# Patient Record
Sex: FEMALE | Race: BLACK OR AFRICAN AMERICAN | NOT HISPANIC OR LATINO | Employment: PART TIME | URBAN - METROPOLITAN AREA
[De-identification: names, ages, dates, MRNs, and addresses within clinical notes are randomized per-mention and may not be internally consistent; named-entity substitution may affect disease eponyms.]

---

## 2020-11-12 DIAGNOSIS — F17.210 CIGARETTE SMOKER: ICD-10-CM

## 2020-11-12 DIAGNOSIS — Z12.2 ENCOUNTER FOR SCREENING FOR MALIGNANT NEOPLASM OF RESPIRATORY ORGANS: Primary | ICD-10-CM

## 2022-03-22 ENCOUNTER — TELEPHONE (OUTPATIENT)
Dept: SURGERY | Facility: CLINIC | Age: 67
End: 2022-03-22
Payer: MEDICARE

## 2022-03-22 NOTE — TELEPHONE ENCOUNTER
----- Message from Porsche Tiwari sent at 3/22/2022 12:31 PM CDT -----  Contact: @[phone]  Pt requesting a call back to reschedule an appt she cancelled for 03/23/22 please call to discuss further.            03/22/22                1321  Spoke to patient regarding the above message. Informed her that she needs to see a colon and rectal doctor for rectal prolapse. Patient verbalized understanding.

## 2023-10-19 ENCOUNTER — TELEPHONE (OUTPATIENT)
Dept: SURGERY | Facility: CLINIC | Age: 68
End: 2023-10-19
Payer: MEDICARE

## 2024-01-25 ENCOUNTER — TELEPHONE (OUTPATIENT)
Dept: SURGERY | Facility: CLINIC | Age: 69
End: 2024-01-25
Payer: MEDICARE

## 2024-01-26 ENCOUNTER — TELEPHONE (OUTPATIENT)
Dept: ENDOSCOPY | Facility: HOSPITAL | Age: 69
End: 2024-01-26
Payer: MEDICARE

## 2024-01-26 ENCOUNTER — OFFICE VISIT (OUTPATIENT)
Dept: SURGERY | Facility: CLINIC | Age: 69
End: 2024-01-26
Payer: MEDICARE

## 2024-01-26 VITALS
HEART RATE: 104 BPM | HEIGHT: 62 IN | WEIGHT: 124.13 LBS | BODY MASS INDEX: 22.84 KG/M2 | SYSTOLIC BLOOD PRESSURE: 173 MMHG | DIASTOLIC BLOOD PRESSURE: 88 MMHG

## 2024-01-26 VITALS — HEIGHT: 62 IN | WEIGHT: 124 LBS | BODY MASS INDEX: 22.82 KG/M2

## 2024-01-26 DIAGNOSIS — Z12.11 COLON CANCER SCREENING: ICD-10-CM

## 2024-01-26 DIAGNOSIS — K62.3 RECTAL PROLAPSE: Primary | ICD-10-CM

## 2024-01-26 DIAGNOSIS — Z12.11 SPECIAL SCREENING FOR MALIGNANT NEOPLASMS, COLON: Primary | ICD-10-CM

## 2024-01-26 PROCEDURE — 99999 PR PBB SHADOW E&M-EST. PATIENT-LVL III: CPT | Mod: PBBFAC,,, | Performed by: SURGERY

## 2024-01-26 PROCEDURE — 99204 OFFICE O/P NEW MOD 45 MIN: CPT | Mod: S$GLB,,, | Performed by: SURGERY

## 2024-01-26 PROCEDURE — 3077F SYST BP >= 140 MM HG: CPT | Mod: CPTII,S$GLB,, | Performed by: SURGERY

## 2024-01-26 PROCEDURE — 3288F FALL RISK ASSESSMENT DOCD: CPT | Mod: CPTII,S$GLB,, | Performed by: SURGERY

## 2024-01-26 PROCEDURE — 1125F AMNT PAIN NOTED PAIN PRSNT: CPT | Mod: CPTII,S$GLB,, | Performed by: SURGERY

## 2024-01-26 PROCEDURE — 1159F MED LIST DOCD IN RCRD: CPT | Mod: CPTII,S$GLB,, | Performed by: SURGERY

## 2024-01-26 PROCEDURE — 1100F PTFALLS ASSESS-DOCD GE2>/YR: CPT | Mod: CPTII,S$GLB,, | Performed by: SURGERY

## 2024-01-26 PROCEDURE — 3008F BODY MASS INDEX DOCD: CPT | Mod: CPTII,S$GLB,, | Performed by: SURGERY

## 2024-01-26 PROCEDURE — 3079F DIAST BP 80-89 MM HG: CPT | Mod: CPTII,S$GLB,, | Performed by: SURGERY

## 2024-01-26 RX ORDER — HYDROCHLOROTHIAZIDE 25 MG/1
25 TABLET ORAL
COMMUNITY

## 2024-01-26 RX ORDER — ATORVASTATIN CALCIUM 40 MG/1
40 TABLET, FILM COATED ORAL
Status: ON HOLD | COMMUNITY
Start: 2023-12-05 | End: 2024-02-29

## 2024-01-26 RX ORDER — SPIRONOLACTONE 25 MG/1
25 TABLET ORAL 2 TIMES DAILY
Status: ON HOLD | COMMUNITY
Start: 2023-12-13 | End: 2024-02-29

## 2024-01-26 RX ORDER — ONDANSETRON 4 MG/1
4 TABLET, ORALLY DISINTEGRATING ORAL EVERY 6 HOURS PRN
Qty: 20 TABLET | Refills: 2 | Status: SHIPPED | OUTPATIENT
Start: 2024-01-26

## 2024-01-26 RX ORDER — ATENOLOL 50 MG/1
TABLET ORAL
COMMUNITY

## 2024-01-26 RX ORDER — AMLODIPINE BESYLATE 10 MG/1
10 TABLET ORAL
COMMUNITY
Start: 2023-12-21

## 2024-01-26 NOTE — PROGRESS NOTES
CRS Office Visit History and Physical    Referring Md:   Self, Aaareferral  No address on file    SUBJECTIVE:     Chief Complaint: pelvic organ prolapse    History of Present Illness:  The patient is a new patient to this practice.   Course is as follows:  Kate Taylor is a 68 y.o. female presents with pelvic organ prolapse.  She reports fecal incontinence and more recently rectal prolapse.  Also reports sensation of bulging/prolapsing tissue per vagina.  Has photos of prolapse at home which show cystocele and full thickness rectal prolapse. She has associated urinary incontinence.  Had three vaginal deliveries.  Has had an episiotomy.  No family history of colon cancer or IBD.  Has had prior colonoscopies, no records in [facility] system or care everywhere.       Review of patient's allergies indicates:   Allergen Reactions    Lisinopril Swelling       Past Medical History:   Diagnosis Date    Asthma     Depression     Hypertension     Sarcoidosis of lung      Past Surgical History:   Procedure Laterality Date    APPENDECTOMY      HYSTERECTOMY      DUB and cervical dysplasia.     SINUS SURGERY  2013    surgery X 3    TONSILLECTOMY       Family History   Problem Relation Age of Onset    Hypertension Mother     Heart disease Mother     Stroke Mother     Hypertension Sister     Hypertension Brother     COPD Brother     Kidney disease Brother     Schizophrenia Brother     Diabetes Neg Hx     Cancer Neg Hx      Social History     Tobacco Use    Smoking status: Former     Current packs/day: 0.00     Types: Cigarettes     Quit date: 2014     Years since quittin.9    Smokeless tobacco: Never    Tobacco comments:     Report quit last week   Substance Use Topics    Alcohol use: Yes     Comment: social    Drug use: No        Review of Systems:  Review of Systems   All other systems reviewed and are negative.    OBJECTIVE:     Vital Signs (Most Recent)  BP (!) 173/88 (BP Location: Left arm, Patient Position:  "Sitting)   Pulse 104   Ht 5' 2.01" (1.575 m)   Wt 56.3 kg (124 lb 1.9 oz)   BMI 22.70 kg/m²     Physical Exam:  A chaperone was present for the anogenital portion of the exam   General: 68 y.o. female in no distress   Neuro: alert and oriented x 4.  Moves all extremities.     HEENT: normocephalic, atraumatic, PERRL, EOMI   Respiratory: respirations are even and unlabored  Cardiac: regular rate and rhythm  Abdomen: soft, NTND  Extremities: Warm dry and intact  Skin: no rashes  Anorectal: patient with very thin perineal body, vaginal exam with descent with valsalva, + cystocele, decreased rectal tone, no full thickness prolapse reproduced on exam     Labs: NA    Imaging: NA      ASSESSMENT/PLAN:     Diagnoses and all orders for this visit:    Rectal prolapse  -     Ambulatory referral/consult to Physical/Occupational Therapy; Future        68 y.o. female with multi compartment pelvic organ prolapse     - records reviewed  - Patient with significant pelvic organ prolapse.    - Start daily fiber supplement, recommend citrucel or fibercon 2 tsp or tabs daily.  Increase to TID as needed.   - Refer to PFPT   - Will need colonoscopy prior to evaluation, message sent to endoscopy scheduling  - Patient discussed with Dr. Rausch and care coordinated.  Will arrange for same day appointments at next visit.      Clementina Snyder MD  Staff Surgeon  Colon & Rectal Surgery    "

## 2024-01-26 NOTE — TELEPHONE ENCOUNTER
Colonoscopy Procedure Prep Instructions    Date of procedure: 5/10/24 Arrive at: 7:40 AM    Location of Department:   [facility] [address] [place] [address]  Take the [facility] Elevators to 4th Floor Endoscopy Lab    As soon as possible:   your prep from pharmacy and over the counter DULCOLAX LAXATIVE TABLETS            On the day before your procedure   What You CAN do:   You may have clear liquids ONLY-see below for list.     Liquids That Are OK to Drink:   Water  Sports drinks (Gatorade, Power-Aid)  Coffee or tea (no cream or nondairy creamer)  Clear juices without pulp (apple, white grape)  Gelatin desserts (no fruit or toppings)  Clear soda (sprite, coke, ginger ale)  Chicken broth (until 12 midnight the night before procedure)    What You CANNOT do:   Do not EAT solid food, drink milk or anything   colored red.  Do not drink alcohol.  Do not take oral medications within 1 hour of starting   each dose of prep.  No gum chewing or candy morning of procedure                       Note:   (Please disregard the insert instructions from pharmacy).  PEG Bowel Prep is indicated for cleansing of the colon as a preparation for colonoscopy in adults.   Be sure to tell your doctor about all the medicines you take, including prescription and non-prescription medicines, vitamins, and herbal supplements. PEG Bowel Prep may affect how other medicines work.  Medication taken by mouth may not be absorbed properly when taken within 1 hour before the start of each dose of PEG Bowel Prep.    It is not uncommon to experience some abdominal cramping, nausea and/or vomiting when taking the prep. If you have nausea and/or vomiting while taking the prep, stop drinking for 20 to 30 minutes then continue.      How to take prep:    PEG Bowel Prep is a (2-day) prep.    One (1) bottle of prep are required for a complete preparation for colonoscopy. Dilute the solution concentrate as directed prior to use.  You must drink water with each dose of prep, and additional water after each dose.    DOSE 1--Day Before Colonoscopy 5/9/24     Drink at least 6 to 8 glasses of clear liquids from time you wake up until you begin your prep and then continue until bedtime to avoid dehydration.     12:00 pm (NOON) Mix your entire container of prep with lukewarm water and refrigerate. Take four (4) Dulcolax (Bisacodyl) tablets with at least 8 ounces or more of clear liquids.       6:00 pm:    You must complete Steps 1 and 2 below before going to bed:    Step 1-Drink half the liquid in the container within one (1) hour.   Step 2-Refrigerate the remaining half of the liquid for dose 2. See below when to begin this step.                       IMPORTANT: If you experience preparation-related symptoms (for example, nausea, bloating, or cramping), stop, or slow the rate of drinking the additional water until your symptoms decrease.    DOSE 2--Day of the Colonoscopy 5/10/24 at 2-3 AM.    For this dose, repeat Step 1 shown above using the remaining half of the liquid prep.   You may continue drinking water/clear liquids until   4 hours before your colonoscopy or as directed by the scheduling nurse  4:40 AM.    For more information about your procedure, please watch this informational video. It is important to watch this animated consent video prior to your arrival.   If you haven't watched the video prior to arriving, you are required to watch it during admission which can cause delays.     Options for viewing:  Using a keyboard:  press and hold the control tab (Ctrl) and left mouse click to follow link          Colonoscopy Instructional Video                                                        OR    Type link address into your web browser's address bar:  [email or web address]/[email or web address]    Using a mobile phone: tap on web address/link.     Comments:             IMPORTANT INFORMATION TO KNOW BEFORE YOUR PROCEDURE    [facility] 4th Floor    If your procedure requires the administration of anesthesia, it is necessary for a responsible adult to drive you home. (Medical Transportation, Uber, Lyft, Taxi, etc. may ONLY be used if a responsible adult is present to accompany you home.  The responsible adult CAN'T be the  of the service).      person must be available to return to pick you up within 15 minutes of being notified of discharge.     Due to the limited socially distant seating in our waiting room, please limit your guest (1) who accompany you for this procedure. If someone accompanies you for this procedure into the facility:    Consider having them proceed to an area that is socially distant other than the lobby until a member of the medical team contacts them to provide an update after the procedure.     Also, please consider being dropped off and picked up from the facility.      Please bring a picture ID, insurance card, & copayment    Take Medications as directed below:        If you begin taking any blood thinning medications or injectable weight loss/diabetes medications (other than insulin) , please contact the endoscopy scheduling department listed below as soon as possible    If you are diabetic see the attached instruction sheet regarding your medication.     If you take HEART, BLOOD PRESSURE, SEIZURE, PAIN, LUNG (including inhalers/nebulizers), ANTI-REJECTION (transplant patients), or PSYCHIATRIC medications, please take at your regular times with a sip of water or as directed by the scheduling nurse.     Important contact information:    Endoscopy [phone] Hours of operation Monday-Friday 8:00-4:30pm.    Questions about insurance or financial obligations call [phone] or [phone].    If you have questions regarding the prep or need to reschedule, please call [phone]. After hours questions requiring immediate assistance, contact [facility] On-Call nurse  line at [phone] or [phone].   NOTE:     On occasion, unforeseen circumstances may cause a delay in your procedure start time. We respect your time and appreciate your patience during these circumstances.      Comments:       Are you ready for your Colonoscopy?      __ If you take blood thinners,weight loss or diabetic injectable medications, have you stopped taking them according to your doctor's instructions before your procedures?  __ Have you stopped eating solid foods and followed a clear liquid diet a full day before your procedure or followed the diet       guidelines indicated in your instructions?       REMINDER: NO BROTH AFTER MIDNIGHT THE DAY BEFORE PROCEDURE.   __ Have you completed all your prep solution? PLEASE DO NOT FOLLOW the insert/or box instructions from pharmacy)  __ Have you taken your blood pressure, heart, seizure, or other essential medications the morning of your procedure?  __ Have you planned for a ride to and from procedure?  (Medical Transportation, Uber, Lyft, Taxi, etc. may ONLY be used if a responsible adult is present to accompany you home). The responsible adult CANNOT be the  of the service.  person must be available to return and pick you up within 15 minutes of being notified of discharge.           Questions or Concerns?  Please call us!  [phone] (M-F) [phone] (Nights and weekends)    Listed below are some helpful tips:    Please bring protective cases for eyewear and hearing aids-wear comfortable clothing/shoes.  Follow prep instructions closely so you don't have to do it twice.  Bowel prep is prescription used to clean out the colon before a colonoscopy. The prep increases movement of your colon by causing you to have diarrhea (loose stools). Cleaning out stool from the colon helps your doctor to see in your colon clearly during this procedure.   It is important to stay hydrated before, during and after bowel prep to prevent loss of fluid  (dehydration). You can have water and your choice of clear liquids. Reminder: these liquids you will drink in addition to the bowel prep.     Preparing the mixture:    First, mix the prep with water.  Make the taste better by adding a sugar free drink mix (Crystal Light) can improve the taste of your prep.   Use a large bore (opening) straw. Place towards the back of mouth (throat) as tolerated.  Prepare a prep mixture that is lightly chilled, but not ice-cold. Drinking a large amount of ice-cold liquid can make you feel very ill.  Avoid drinking any RED beverages or eating popsicles with this color for the 24 hours leading up to your procedure. This color can look like blood in the colon.    Consuming the prep:    Take your time. If you feel ill, take a 15-minute break from drinking the prep mixture  Combat hunger and dehydration with clear liquids. Options like JELL-O, or popsicles will help.  Settle in with good reading material. The goal is to clean out 6 feet of colon, so you can plan on spending a good deal of time in the bathroom. Have some good reading material on-hand or an iPad ready to keep yourself entertained!  Keep yourself comfortable. We recommend applying personal hygiene wipes, Tucks pads and a soothing ointment, like A&D ointment, Desitin, or Vaseline to your bottom before starting and as needed to protect your skin.

## 2024-01-26 NOTE — TELEPHONE ENCOUNTER
"----- Message from Leelee Recinos sent at 2024  1:11 PM CST -----     ----- Message -----  From: Clementina Snyder MD  Sent: 2024  10:18 AM CST  To: [facility] Patients     Procedure: Colonoscopy     Diagnosis: Screening colonoscopy     Procedure Timin-12 weeks     #If within 4 weeks selected, please caroline as high priority#     #If greater than 12 weeks, please select "5-12 weeks" and delay sending until 3 months prior to requested date#      Provider: Myself     Location: [address]     Additional Scheduling Information: No scheduling concerns     Prep Specifications:Standard prep     Is the patient taking a GLP-1 Agonist:no     Have you attached a patient to this message: yes   "

## 2024-01-26 NOTE — TELEPHONE ENCOUNTER
"----- Message from Leelee Recinos sent at 2024  1:11 PM CST -----    ----- Message -----  From: Clementina Snyder MD  Sent: 2024  10:18 AM CST  To: [facility] Patients    Procedure: Colonoscopy    Diagnosis: Screening colonoscopy    Procedure Timin-12 weeks    #If within 4 weeks selected, please caroline as high priority#    #If greater than 12 weeks, please select "5-12 weeks" and delay sending until 3 months prior to requested date#     Provider: Myself    Location: [address]    Additional Scheduling Information: No scheduling concerns    Prep Specifications:Standard prep    Is the patient taking a GLP-1 Agonist:no    Have you attached a patient to this message: yes       "

## 2024-01-26 NOTE — TELEPHONE ENCOUNTER
Spoke to patient to schedule procedure(s) Colonoscopy       Physician to perform procedure(s) Dr. KARO Snyder  Date of Procedure (s) 5/10/24  Arrival Time 7:40 AM  Time of Procedure(s) 8:40 AM   Location of Procedure(s) [facility] 4th Floor  Type of Rx Prep sent to patient: PEG  Instructions provided to patient via Postal Mail    Patient was informed on the following information and verbalized understanding. Screening questionnaire reviewed with patient and complete. If procedure requires anesthesia, a responsible adult needs to be present to accompany the patient home, patient cannot drive after receiving anesthesia. Appointment details are tentative, especially check-in time. Patient will receive a prep-op call 7 days prior to confirm check-in time for procedure. If applicable the patient should contact their pharmacy to verify Rx for procedure prep is ready for pick-up. Patient was advised to call the scheduling department at [phone] if pharmacy states no Rx is available. Patient was advised to call the endoscopy scheduling department if any questions or concerns arise.      [facility] Endoscopy Scheduling Department

## 2024-01-30 ENCOUNTER — CLINICAL SUPPORT (OUTPATIENT)
Dept: REHABILITATION | Facility: OTHER | Age: 69
End: 2024-01-30
Attending: SURGERY
Payer: MEDICARE

## 2024-01-30 DIAGNOSIS — R27.8 COORDINATION IMPAIRMENT: ICD-10-CM

## 2024-01-30 DIAGNOSIS — K62.3 RECTAL PROLAPSE: ICD-10-CM

## 2024-01-30 DIAGNOSIS — M62.89 PELVIC FLOOR DYSFUNCTION: Primary | ICD-10-CM

## 2024-01-30 PROCEDURE — 97112 NEUROMUSCULAR REEDUCATION: CPT

## 2024-01-30 PROCEDURE — 97162 PT EVAL MOD COMPLEX 30 MIN: CPT

## 2024-01-30 PROCEDURE — 97530 THERAPEUTIC ACTIVITIES: CPT

## 2024-02-15 ENCOUNTER — TELEPHONE (OUTPATIENT)
Dept: SURGERY | Facility: CLINIC | Age: 69
End: 2024-02-15
Payer: MEDICARE

## 2024-02-19 ENCOUNTER — OFFICE VISIT (OUTPATIENT)
Dept: UROGYNECOLOGY | Facility: CLINIC | Age: 69
End: 2024-02-19
Payer: MEDICARE

## 2024-02-19 ENCOUNTER — TELEPHONE (OUTPATIENT)
Dept: ORTHOPEDICS | Facility: CLINIC | Age: 69
End: 2024-02-19
Payer: MEDICARE

## 2024-02-19 ENCOUNTER — OFFICE VISIT (OUTPATIENT)
Dept: SURGERY | Facility: CLINIC | Age: 69
End: 2024-02-19
Payer: MEDICARE

## 2024-02-19 VITALS
DIASTOLIC BLOOD PRESSURE: 85 MMHG | WEIGHT: 123.69 LBS | SYSTOLIC BLOOD PRESSURE: 130 MMHG | BODY MASS INDEX: 22.62 KG/M2

## 2024-02-19 VITALS
HEART RATE: 82 BPM | SYSTOLIC BLOOD PRESSURE: 146 MMHG | OXYGEN SATURATION: 97 % | RESPIRATION RATE: 19 BRPM | WEIGHT: 123.69 LBS | DIASTOLIC BLOOD PRESSURE: 87 MMHG | BODY MASS INDEX: 22.76 KG/M2 | HEIGHT: 62 IN

## 2024-02-19 DIAGNOSIS — N39.46 URINARY INCONTINENCE, MIXED: ICD-10-CM

## 2024-02-19 DIAGNOSIS — K62.3 RECTAL PROLAPSE: ICD-10-CM

## 2024-02-19 DIAGNOSIS — M75.102 TEAR OF LEFT ROTATOR CUFF, UNSPECIFIED TEAR EXTENT, UNSPECIFIED WHETHER TRAUMATIC: Primary | ICD-10-CM

## 2024-02-19 DIAGNOSIS — N99.3 VAGINAL VAULT PROLAPSE, POSTHYSTERECTOMY: ICD-10-CM

## 2024-02-19 DIAGNOSIS — M79.18 MYALGIA OF PELVIC FLOOR: ICD-10-CM

## 2024-02-19 DIAGNOSIS — K62.3 RECTAL PROLAPSE: Primary | ICD-10-CM

## 2024-02-19 DIAGNOSIS — N95.2 VAGINAL ATROPHY: ICD-10-CM

## 2024-02-19 PROCEDURE — 51701 INSERT BLADDER CATHETER: CPT | Mod: S$GLB,,, | Performed by: OBSTETRICS & GYNECOLOGY

## 2024-02-19 PROCEDURE — 3079F DIAST BP 80-89 MM HG: CPT | Mod: CPTII,S$GLB,, | Performed by: SURGERY

## 2024-02-19 PROCEDURE — 1126F AMNT PAIN NOTED NONE PRSNT: CPT | Mod: CPTII,S$GLB,, | Performed by: OBSTETRICS & GYNECOLOGY

## 2024-02-19 PROCEDURE — 99205 OFFICE O/P NEW HI 60 MIN: CPT | Mod: 25,S$GLB,, | Performed by: OBSTETRICS & GYNECOLOGY

## 2024-02-19 PROCEDURE — 3288F FALL RISK ASSESSMENT DOCD: CPT | Mod: CPTII,S$GLB,, | Performed by: SURGERY

## 2024-02-19 PROCEDURE — 99999 PR PBB SHADOW E&M-EST. PATIENT-LVL IV: CPT | Mod: PBBFAC,,, | Performed by: OBSTETRICS & GYNECOLOGY

## 2024-02-19 PROCEDURE — 99999 PR PBB SHADOW E&M-EST. PATIENT-LVL III: CPT | Mod: PBBFAC,,, | Performed by: SURGERY

## 2024-02-19 PROCEDURE — 87086 URINE CULTURE/COLONY COUNT: CPT | Performed by: OBSTETRICS & GYNECOLOGY

## 2024-02-19 PROCEDURE — 1159F MED LIST DOCD IN RCRD: CPT | Mod: CPTII,S$GLB,, | Performed by: OBSTETRICS & GYNECOLOGY

## 2024-02-19 PROCEDURE — 3079F DIAST BP 80-89 MM HG: CPT | Mod: CPTII,S$GLB,, | Performed by: OBSTETRICS & GYNECOLOGY

## 2024-02-19 PROCEDURE — 3077F SYST BP >= 140 MM HG: CPT | Mod: CPTII,S$GLB,, | Performed by: SURGERY

## 2024-02-19 PROCEDURE — 1100F PTFALLS ASSESS-DOCD GE2>/YR: CPT | Mod: CPTII,S$GLB,, | Performed by: SURGERY

## 2024-02-19 PROCEDURE — 1160F RVW MEDS BY RX/DR IN RCRD: CPT | Mod: CPTII,S$GLB,, | Performed by: OBSTETRICS & GYNECOLOGY

## 2024-02-19 PROCEDURE — 1125F AMNT PAIN NOTED PAIN PRSNT: CPT | Mod: CPTII,S$GLB,, | Performed by: SURGERY

## 2024-02-19 PROCEDURE — 3008F BODY MASS INDEX DOCD: CPT | Mod: CPTII,S$GLB,, | Performed by: SURGERY

## 2024-02-19 PROCEDURE — 3008F BODY MASS INDEX DOCD: CPT | Mod: CPTII,S$GLB,, | Performed by: OBSTETRICS & GYNECOLOGY

## 2024-02-19 PROCEDURE — 99213 OFFICE O/P EST LOW 20 MIN: CPT | Mod: S$GLB,,, | Performed by: SURGERY

## 2024-02-19 PROCEDURE — 3075F SYST BP GE 130 - 139MM HG: CPT | Mod: CPTII,S$GLB,, | Performed by: OBSTETRICS & GYNECOLOGY

## 2024-02-19 RX ORDER — ESTRADIOL 0.1 MG/G
CREAM VAGINAL
Qty: 42.5 G | Refills: 11 | Status: SHIPPED | OUTPATIENT
Start: 2024-02-19 | End: 2024-05-07 | Stop reason: SDUPTHER

## 2024-02-19 RX ORDER — TRAMADOL HYDROCHLORIDE 50 MG/1
50 TABLET ORAL 4 TIMES DAILY PRN
Status: ON HOLD | COMMUNITY
End: 2024-02-29

## 2024-02-19 RX ORDER — SODIUM, POTASSIUM,MAG SULFATES 17.5-3.13G
1 SOLUTION, RECONSTITUTED, ORAL ORAL DAILY
Qty: 1 KIT | Refills: 0 | Status: SHIPPED | OUTPATIENT
Start: 2024-02-19 | End: 2024-02-19 | Stop reason: CLARIF

## 2024-02-19 RX ORDER — ERGOCALCIFEROL 1.25 1/1
50000 CAPSULE ORAL
COMMUNITY
Start: 2023-12-21

## 2024-02-19 RX ORDER — POLYETHYLENE GLYCOL 3350, SODIUM SULFATE ANHYDROUS, SODIUM BICARBONATE, SODIUM CHLORIDE, POTASSIUM CHLORIDE 236; 22.74; 6.74; 5.86; 2.97 G/4L; G/4L; G/4L; G/4L; G/4L
4 POWDER, FOR SOLUTION ORAL ONCE
Qty: 4000 ML | Refills: 0 | Status: SHIPPED | OUTPATIENT
Start: 2024-02-19 | End: 2024-02-20 | Stop reason: SDUPTHER

## 2024-02-19 NOTE — PATIENT INSTRUCTIONS
Bladder Irritants  Certain foods and drinks have been associated with worsening symptoms of urinary frequency, urgency, urge incontinence, or bladder pain. If you suffer from any of these conditions, you may wish to try eliminating one or more of these foods from your diet and see if your symptoms improve. If bladder symptoms are related to dietary factors, strict adherence to a diet thateliminates the food should bring marked relief in 10 days. Once you are feeling better, you can begin to add foods back into your diet, one at a time. If symptoms return, you will be able to identify the irritant. As you add foods back to your diet it is very important that you drink significant amounts of water.    -----------------------------------------------------------------------------------------------  List of Common Bladder Irritants*  Alcoholic beverages  Apples and apple juice  Cantaloupe  Carbonated beverages  Chili and spicy foods  Chocolate  Citrus fruit  Coffee (including decaffeinated)  Cranberries and cranberry juice  Grapes  Guava  Milk Products: milk, cheese, cottage cheese, yogurt, ice cream  Peaches  Pineapple  Plums  Strawberries  Sugar especially artificial sweeteners, saccharin, aspartame, corn sweeteners, honey, fructose, sucrose, lactose  Tea  Tomatoes and tomato juice  Vitamin B complex  Vinegar  *Most people are not sensitive to ALL of these products; your goal is to find the foods that make YOUR symptoms worse.  ---------------------------------------------------------------------------------------------------    Low-acid fruit substitutions include apricots, papaya, pears and watermelon. Coffee drinkers can drink Kava or other lowacid instant drinks. Tea drinkers can substitute non-citrus herbal and sun brewed teas. Calcium carbonate co-buffered with calcium ascorbate can be substituted for Vitamin C. Prelief is a dietary supplement that works as an acid blocker for the bladder.    Where to get more  information:        Overcoming Bladder Disorders by Giovanna Chi and Landon Ibarra, 1990        You Dont Have to Live with Cystitis! By Maribel Esteban, 1988  http://www.urologymanagement.org/oab  ------------------------------------------------------------------------------------------    1)  L rotator cuff tear:  --symptoms worsening  --will get you appt with [facility] ASAP    2)  Rectal prolapse:  --surgery per Dr. Snyder  --do we need to repair vaginal issues at same time?    3)  ?pelvic organ prolapse:  --no major on exam on today  --has seen bulge in past (identified area with mirror--seems to be at introitus)  --MR brandee to better evaluate area   --do some heavier lifting/work day before procedure to see if bulge will come out more  --if repairing rectal prolapse, need to make sure we don't miss vaginal prolapse because can become worse   --consider laparoscopic sacral colpopexy vs uterosacral suspension/anterior/posterior repair   --if surgery: bladder testing to see if need sling for stress leakage    4)  Vaginal atrophy (dryness):    --start Vaginal estrogen:  --Use 0.5 grams of estrogen cream in vagina with applicator or dime-sized amount with finger (as far as can reach internally) nightly x 2 weeks, then twice a week thereafter.  You can also apply a dime-sized amount with your finger around the vaginal opening and inner lips at same frequency.     --Vaginal estrogen may help to decrease pain related to dryness with intercourse and urinary symptoms (urgency/frequency/UTIs) around menopause.     5)  Straining with stools (intermittent C/D) + fecal incontinence:  --hydrate well  Controlling constipation may help bladder urgency/leakage and fiber may better control cholesterol and blood glucose.  Start daily fiber.  Take 1 tsp of fiber powder (psyllium or other sugar-free powder).  Mix in 8 oz of water.  Take x 3-5 days.  Then, increase fiber by 1 tsp every 3-5 days until  stool is easy to pass, not to hard or too soft.  Stop and continue at that dose.   Do not exceed 6 tsps/day.  May also use over the counter stool softener 1-2 x/day.    --can use miralax for rescue  --need MR irvin    6)  Mixed urinary incontinence, urge < stress:    --urine C&S  --Empty bladder every 3 hours.  Empty well: wait a minute, lean forward on toilet.    --Avoid dietary irritants (see sheet).  Keep diary x 3-5 days to determine your irritants.  --start pelvic floor PT. Call to make appt.  Let us know if they don't take your insurance:    Ade Aranda, PT, DPT ([facility]: [address], [place] [address]): (p) [phone].  (f) [phone].  Angely Mchugh, PT, DPT or Sarina Zaidi, PT, DPT   --EARLY/LATE APPT OPTIONS:   TU, TH, FRI: 620 AM 1st APPT    M-TH (EVERY DAY BUT FRI): 520 PM LAST APPT    --URGE: consider medication in future. Takes 2-4 weeks to see if will have effect.  For dry mouth: get sour, sugar free lozenge or gum.    --STRESS:  Pessary vs. Sling.     7)  Ortho consult. MR irvin.  Start PT/Fiber/estrogen cream. Will call you with results and to discuss next steps.

## 2024-02-19 NOTE — PROGRESS NOTES
[facility] - UROGYNECOLOGY  [address] [address]    Kate Taylor  [id number]  1955 February 19, 2024    Consulting Physician: Clementina Snyder MD   GYN: none  Primary M.D.: Marylou Gonsalez MD    Chief Complaint   Patient presents with    Vaginal Prolapse       HPI:     1)  UI:  (+) MARGE > (+) UUI (if holds too long) X years. (+) pads:2/day, usually minimum wetness and 1/night usually minimum wetness unless coughs/sneezes.  Daytime frequency: Q 2 hours.  Nocturia: Yes: 1/night.   (--) dysuria,  (--) hematuria,  (--) frequent UTIs.  (+) complete bladder emptying.     2)  POP:  Present. above introitus. Worsening.  Worse when on feet or after diarrhea.  Symptoms:(+)  pressure, discomfort.  (--) vaginal bleeding. (--) vaginal discharge. (--) sexually active.  (+) dyspareunia--last 10 years ago, ? Due to POP.   (--)  Vaginal dryness.  (--) vaginal estrogen use.     3)  BM:  (+) constipation/straining. Intermittent with  (+) chronic diarrhea. Takes miralax PRN for constipation.  Not taking recommended fiber.  (--) hematochezia.  (+) fecal incontinence--mostly with diarrhea.  (+) fecal smearing/urgency.  (--) complete evacuation.  +rectal prolapse: Has photos of prolapse at home which show cystocele and full thickness rectal prolapse.      Past Medical History  Past Medical History:   Diagnosis Date    Asthma     Depression     Hypertension     Sarcoidosis of lung    --Asthma: supposed to use advair + duonebs  GERD  Hypercholesterolemia  Low back pain: was taking gabapentin/muscle relaxer--stopped due to unstable gait; now taking ibuprofen 800 mg daily  Torn rotator cuff L--followed by ortho, hasn't healed, hasn't followed up again    Past Surgical History  Past Surgical History:   Procedure Laterality Date    APPENDECTOMY      HYSTERECTOMY      DUB and cervical dysplasia.     SINUS SURGERY  5/19/2013    surgery X 3    TONSILLECTOMY        Hysterectomy: Yes   Date: 33 yo.  Indication:  AUB, cervical dysplasia.    Type: xlap/Pfannenstiel  Cervix present: No  Ovaries present: No. Was on HRT.   Other procedures at time of hysterectomy:  appy    Past Ob History     x 3.  C/s x 0.    Largest infant weight: 7#9oz.   yes FAVD. yes episiotomy.      Gynecologic History  LMP: No LMP recorded. Patient has had a hysterectomy.  Age of menarche: 12 yo  Age of menopause: with VERO  Menstrual history: ho AUB  Pap test: post hyst.  History of abnormal paps: Yes.  History of STIs:  No  Mammogram: Date of last: 2023.  Result: Normal  Colonoscopy: Date of last: >10 years ago.  Result:  polyps per report.  Repeat due:  Scheduled for 2024 with Lillian.  DEXA:  Date of last: .  Result:  osteoporosis--fosamax, D.  Repeat due:  per PCP.     Family History  Family History   Problem Relation Age of Onset    Hypertension Mother     Heart disease Mother     Stroke Mother     Hypertension Sister     Hypertension Brother     COPD Brother     Kidney disease Brother     Schizophrenia Brother     Diabetes Neg Hx     Cancer Neg Hx       Colon CA: No  Breast CA: No  GYN CA: No   CA: No    Social History  Social History     Tobacco Use   Smoking Status Former    Current packs/day: 0.00    Types: Cigarettes    Quit date: 2014    Years since quittin.9   Smokeless Tobacco Never   Tobacco Comments    Report quit last week     Social History     Substance and Sexual Activity   Alcohol Use Yes    Comment: social   .    Social History     Substance and Sexual Activity   Drug Use No     The patient is .  Resides in [place] [address].  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    Allergies  Review of patient's allergies indicates:   Allergen Reactions    Lisinopril Swelling       Medications  Current Outpatient Medications on File Prior to Visit   Medication Sig Dispense Refill    albuterol-ipratropium 2.5mg-0.5mg/3mL (DUO-NEB) 0.5 mg-3 mg(2.5 mg base)/3 mL nebulizer solution Take  3 mLs by nebulization every 4 (four) hours as needed for Wheezing. 100 vial 3    amLODIPine (NORVASC) 10 MG tablet Take 10 mg by mouth.      atenoloL (TENORMIN) 50 MG tablet Take by mouth.      atorvastatin (LIPITOR) 40 MG tablet Take 40 mg by mouth.      cyclobenzaprine (FLEXERIL) 10 MG tablet Take 1 tablet (10 mg total) by mouth 3 (three) times daily as needed for Muscle spasms. (Patient not taking: Reported on 2/19/2024) 20 tablet 0    famotidine (PEPCID) 20 MG tablet Take 1 tablet (20 mg total) by mouth 2 (two) times daily. 20 tablet 0    fluticasone-salmeterol 250-50 mcg/dose (ADVAIR) 250-50 mcg/dose diskus inhaler Inhale 1 puff into the lungs 2 (two) times daily. 60 each 11    gabapentin (NEURONTIN) 300 MG capsule Take 1 capsule (300 mg total) by mouth 3 (three) times daily. 90 capsule 2    hydroCHLOROthiazide (HYDRODIURIL) 25 MG tablet Take 25 mg by mouth.      ibuprofen (ADVIL,MOTRIN) 800 MG tablet Take 1 tablet (800 mg total) by mouth every 6 (six) hours as needed for Pain. 20 tablet 0    nicotine (NICODERM CQ) 21 mg/24 hr Place 1 patch onto the skin once daily. (Patient not taking: Reported on 2/19/2024)      ondansetron (ZOFRAN-ODT) 4 MG TbDL Take 1 tablet (4 mg total) by mouth every 6 (six) hours as needed. 20 tablet 2    orphenadrine (NORFLEX) 100 mg tablet Take 1 tablet (100 mg total) by mouth 2 (two) times daily as needed for Muscle spasms. (Patient not taking: Reported on 2/19/2024) 30 tablet 3    spironolactone (ALDACTONE) 25 MG tablet Take 25 mg by mouth 2 (two) times daily.      traMADoL (ULTRAM) 50 mg tablet Take 50 mg by mouth 4 (four) times daily as needed.      VITAMIN D2 1,250 mcg (50,000 unit) capsule Take 50,000 Units by mouth every 7 days.      [DISCONTINUED] sodium,potassium,mag sulfates (SUPREP BOWEL PREP KIT) 17.5-3.13-1.6 gram SolR Take 177 mLs by mouth once daily. for 2 days 1 kit 0     No current facility-administered medications on file prior to visit.       Review of Systems A 14  point ROS was reviewed with pertinent positives as noted above in the history of present illness.      Constitutional: negative  Eyes: negative  Endocrine: negative  Gastrointestinal: negative  Cardiovascular: negative  Respiratory: negative  Allergic/Immunologic: negative  Integumentary: negative  Psychiatric: negative  Musculoskeletal: negative   Ear/Nose/Throat: negative  Neurologic: negative  Genitourinary: SEE HPI  Hematologic/Lymphatic: negative   Breast: negative    Urogynecologic Exam  /85 (BP Location: Right arm, Patient Position: Sitting)   Wt 56.1 kg (123 lb 10.9 oz)   BMI 22.62 kg/m²     GENERAL APPEARANCE:  The patient is well-developed, well-nourished.   Neck:  Supple with no thyromegaly, no carotid bruits.  Heart:  Regular rate and rhythm, no murmurs, rubs or gallops.  Lungs:  Clear.  No CVA tenderness.  Abdomen:  Soft, nontender, nondistended, no hepatosplenomegaly.  Incisions:  Pfannenstiel well-healed    PELVIC:    External genitalia:  Normal Bartholins, Skenes and labia bilaterally.    Urethra:  No caruncle, diverticulum or masses.  (+) hypermobility.    Vagina:  Atrophy (+) , no bladder masses or tender, no discharge.  LV R>L +TTP.    Cervix:  absent  Uterus: uterus absent  Adnexa: Not palpable.    POP-Q:  Aa -2; Ba -2; C -8; Ap -2; Bp -2.  Genital hiatus 2, perineal body 2. total vaginal length 9. (Standing)    NEUROLOGIC:  Cranial nerves 2 through 12 intact.  Strength 5/5.  DTRs 2+ lower extremities.  S2 through 4 normal.  Sacral reflexes intact.    EXT: CHAUDHRY, 2+ pulses bilaterally, no C/C/E    COUGH STRESS TEST:  negative  KEGEL: 1 /5    RECTAL:    External:  Normal, (--) hemorrhoids, (--) dovetailing. No prolapse noted today but supine.   Internal:   deferred    PVR: 20 mL    Impression    1. Tear of left rotator cuff, unspecified tear extent, unspecified whether traumatic    2. Urinary incontinence, mixed    3. Vaginal atrophy    4. Vaginal vault prolapse, posthysterectomy    5.  Rectal prolapse    6. Myalgia of pelvic floor        Initial Plan  The patient was counseled regarding these issues. The patient was given a summary sheet containing each of these issues with possible options for evaluation and management. When appropriate, we also reviewed computer-generated diagrams specific to their diagnoses..  All questions were addressed to the patient's satisfaction.    1)  L rotator cuff tear:  --symptoms worsening  --will get you appt with [facility] ASAP    2)  Rectal prolapse:  --surgery per Dr. Snyder  --do we need to repair vaginal issues at same time?    3)  ?pelvic organ prolapse:  --no major on exam on today  --has seen bulge in past (identified area with mirror--seems to be at introitus)--showed phone photo--scanned into media:    --MR brandee to better evaluate area   --do some heavier lifting/work day before procedure to see if bulge will come out more  --if repairing rectal prolapse, need to make sure we don't miss vaginal prolapse because can become worse   --consider laparoscopic sacral colpopexy vs uterosacral suspension/anterior/posterior repair   --if surgery: bladder testing to see if need sling for stress leakage   --if surgery: ortho consult for L rotator cuff   --if surgery: clearance per PCP (Wallace) + labs (CBC, CMP, T&S)/EKG/CXR    4)  Vaginal atrophy (dryness):    --start Vaginal estrogen:  --Use 0.5 grams of estrogen cream in vagina with applicator or dime-sized amount with finger (as far as can reach internally) nightly x 2 weeks, then twice a week thereafter.  You can also apply a dime-sized amount with your finger around the vaginal opening and inner lips at same frequency.     --Vaginal estrogen may help to decrease pain related to dryness with intercourse and urinary symptoms (urgency/frequency/UTIs) around menopause.     5)  Straining with stools (intermittent C/D) + fecal incontinence:  --hydrate well  Controlling constipation may help bladder urgency/leakage and fiber  may better control cholesterol and blood glucose.  Start daily fiber.  Take 1 tsp of fiber powder (psyllium or other sugar-free powder).  Mix in 8 oz of water.  Take x 3-5 days.  Then, increase fiber by 1 tsp every 3-5 days until stool is easy to pass, not to hard or too soft.  Stop and continue at that dose.   Do not exceed 6 tsps/day.  May also use over the counter stool softener 1-2 x/day.    --can use miralax for rescue  --levator mylagia B +  --need MR brandee    6)  Mixed urinary incontinence, urge < stress:    --urine C&S  --Empty bladder every 3 hours.  Empty well: wait a minute, lean forward on toilet.    --Avoid dietary irritants (see sheet).  Keep diary x 3-5 days to determine your irritants.  --start pelvic floor PT. Call to make appt.  Let us know if they don't take your insurance:    Ade Aranda, PT, DPT ([facility]: [address], [place] [address]): (p) [phone].  (f) [phone].  Angely Mchugh, PT, DPT or Sarina Zaidi, PT, DPT   --EARLY/LATE APPT OPTIONS:   TU, TH, FRI: 620 AM 1st APPT    M-TH (EVERY DAY BUT FRI): 520 PM LAST APPT    --URGE: consider medication in future. Takes 2-4 weeks to see if will have effect.  For dry mouth: get sour, sugar free lozenge or gum.    --STRESS:  Pessary vs. Sling.     7)  Ortho consult. MR irvin.  Start PT/Fiber/estrogen cream. Will call you with results and to discuss next steps.      Approximately 60 min were spent in consult, 90 % in discussion.     Thank you for requesting consultation of your patient.  I look forward to participating in their care.    Mihir Rausch  Female Pelvic Medicine and Reconstructive Surgery  [facility] [place], [place]

## 2024-02-20 ENCOUNTER — HOSPITAL ENCOUNTER (OUTPATIENT)
Dept: RADIOLOGY | Facility: OTHER | Age: 69
Discharge: HOME | End: 2024-02-20
Attending: SPECIALIST/TECHNOLOGIST
Payer: MEDICARE

## 2024-02-20 ENCOUNTER — OFFICE VISIT (OUTPATIENT)
Dept: ORTHOPEDICS | Facility: CLINIC | Age: 69
End: 2024-02-20
Payer: MEDICARE

## 2024-02-20 ENCOUNTER — ANESTHESIA EVENT (OUTPATIENT)
Dept: ENDOSCOPY | Facility: OTHER | Age: 69
End: 2024-02-20
Payer: MEDICARE

## 2024-02-20 VITALS — WEIGHT: 123.69 LBS | BODY MASS INDEX: 22.76 KG/M2 | HEIGHT: 62 IN

## 2024-02-20 DIAGNOSIS — M75.102 TEAR OF LEFT ROTATOR CUFF, UNSPECIFIED TEAR EXTENT, UNSPECIFIED WHETHER TRAUMATIC: ICD-10-CM

## 2024-02-20 DIAGNOSIS — M75.102 TEAR OF LEFT ROTATOR CUFF, UNSPECIFIED TEAR EXTENT, UNSPECIFIED WHETHER TRAUMATIC: Primary | ICD-10-CM

## 2024-02-20 LAB — BACTERIA UR CULT: NO GROWTH

## 2024-02-20 PROCEDURE — 3288F FALL RISK ASSESSMENT DOCD: CPT | Mod: CPTII,S$GLB,, | Performed by: SPECIALIST/TECHNOLOGIST

## 2024-02-20 PROCEDURE — 3008F BODY MASS INDEX DOCD: CPT | Mod: CPTII,S$GLB,, | Performed by: SPECIALIST/TECHNOLOGIST

## 2024-02-20 PROCEDURE — 20610 DRAIN/INJ JOINT/BURSA W/O US: CPT | Mod: LT,S$GLB,, | Performed by: SPECIALIST/TECHNOLOGIST

## 2024-02-20 PROCEDURE — 99999 PR PBB SHADOW E&M-EST. PATIENT-LVL IV: CPT | Mod: PBBFAC,,, | Performed by: SPECIALIST/TECHNOLOGIST

## 2024-02-20 PROCEDURE — 73030 X-RAY EXAM OF SHOULDER: CPT | Mod: TC,FY,LT

## 2024-02-20 PROCEDURE — 1159F MED LIST DOCD IN RCRD: CPT | Mod: CPTII,S$GLB,, | Performed by: SPECIALIST/TECHNOLOGIST

## 2024-02-20 PROCEDURE — 1125F AMNT PAIN NOTED PAIN PRSNT: CPT | Mod: CPTII,S$GLB,, | Performed by: SPECIALIST/TECHNOLOGIST

## 2024-02-20 PROCEDURE — 73030 X-RAY EXAM OF SHOULDER: CPT | Mod: 26,LT,, | Performed by: RADIOLOGY

## 2024-02-20 PROCEDURE — 99204 OFFICE O/P NEW MOD 45 MIN: CPT | Mod: 25,S$GLB,, | Performed by: SPECIALIST/TECHNOLOGIST

## 2024-02-20 PROCEDURE — 1100F PTFALLS ASSESS-DOCD GE2>/YR: CPT | Mod: CPTII,S$GLB,, | Performed by: SPECIALIST/TECHNOLOGIST

## 2024-02-20 RX ORDER — TRIAMCINOLONE ACETONIDE 40 MG/ML
80 INJECTION, SUSPENSION INTRA-ARTICULAR; INTRAMUSCULAR
Status: DISCONTINUED | OUTPATIENT
Start: 2024-02-20 | End: 2024-02-20 | Stop reason: HOSPADM

## 2024-02-20 RX ORDER — POLYETHYLENE GLYCOL 3350, SODIUM SULFATE ANHYDROUS, SODIUM BICARBONATE, SODIUM CHLORIDE, POTASSIUM CHLORIDE 236; 22.74; 6.74; 5.86; 2.97 G/4L; G/4L; G/4L; G/4L; G/4L
4 POWDER, FOR SOLUTION ORAL ONCE
Qty: 4000 ML | Refills: 0 | Status: SHIPPED | OUTPATIENT
Start: 2024-02-20 | End: 2024-02-20

## 2024-02-20 RX ADMIN — TRIAMCINOLONE ACETONIDE 80 MG: 40 INJECTION, SUSPENSION INTRA-ARTICULAR; INTRAMUSCULAR at 08:02

## 2024-02-20 NOTE — PROGRESS NOTES
Subjective:       Patient ID: Kate Taylor is a 68 y.o. female.    Chief Complaint: Pain and Injury of the Left Shoulder      HPI  2024   Patient reports back in 2023 she had a fall that caused dislocation of her wrist as well as her left shoulder with reverse Hill-Sachs lesion.  She states she was seeing care at [facility].  She subsequent got an MRI that showed a partial-thickness tear of the supra and infraspinatus with calcific tendonitis.  She reports that she was doing therapy that helped improve her shoulder while she was doing therapy, but as soon as therapy ended her shoulder pain returned.  She is received multiple injections in the left shoulder with the last being in in 2023.  She reports she had very good relief from the injection for about 6 weeks. She states that her left shoulder pain is impacting her activities of daily living such as brushing her hair.  She denies any numbness or tingling.      Past Medical History:   Diagnosis Date    Asthma     Depression     Hypertension     Sarcoidosis of lung      Past Surgical History:   Procedure Laterality Date    APPENDECTOMY      HYSTERECTOMY      DUB and cervical dysplasia.     SINUS SURGERY  2013    surgery X 3    TONSILLECTOMY       Family History   Problem Relation Age of Onset    Hypertension Mother     Heart disease Mother     Stroke Mother     Hypertension Sister     Hypertension Brother     COPD Brother     Kidney disease Brother     Schizophrenia Brother     Diabetes Neg Hx     Cancer Neg Hx      Social History     Socioeconomic History    Marital status:     Number of children: 3   Occupational History    Occupation: Registered Nurse     Employer: [employer]   Tobacco Use    Smoking status: Former     Current packs/day: 0.00     Types: Cigarettes     Quit date: 2014     Years since quittin.9    Smokeless tobacco: Never    Tobacco comments:     Report quit last week   Substance and Sexual  Activity    Alcohol use: Yes     Comment: social    Drug use: No       Current Outpatient Medications   Medication Sig Dispense Refill    amLODIPine (NORVASC) 10 MG tablet Take 10 mg by mouth.      atenoloL (TENORMIN) 50 MG tablet Take by mouth.      atorvastatin (LIPITOR) 40 MG tablet Take 40 mg by mouth.      estradioL (ESTRACE) 0.01 % (0.1 mg/gram) vaginal cream 0.5 grams with applicator or dime-sized amount with finger in vagina nightly x 2 weeks, then twice a week thereafter 42.5 g 11    hydroCHLOROthiazide (HYDRODIURIL) 25 MG tablet Take 25 mg by mouth.      ibuprofen (ADVIL,MOTRIN) 800 MG tablet Take 1 tablet (800 mg total) by mouth every 6 (six) hours as needed for Pain. 20 tablet 0    ondansetron (ZOFRAN-ODT) 4 MG TbDL Take 1 tablet (4 mg total) by mouth every 6 (six) hours as needed. 20 tablet 2    spironolactone (ALDACTONE) 25 MG tablet Take 25 mg by mouth 2 (two) times daily.      traMADoL (ULTRAM) 50 mg tablet Take 50 mg by mouth 4 (four) times daily as needed.      VITAMIN D2 1,250 mcg (50,000 unit) capsule Take 50,000 Units by mouth every 7 days.      albuterol-ipratropium 2.5mg-0.5mg/3mL (DUO-NEB) 0.5 mg-3 mg(2.5 mg base)/3 mL nebulizer solution Take 3 mLs by nebulization every 4 (four) hours as needed for Wheezing. 100 vial 3    cyclobenzaprine (FLEXERIL) 10 MG tablet Take 1 tablet (10 mg total) by mouth 3 (three) times daily as needed for Muscle spasms. (Patient not taking: Reported on 2/19/2024) 20 tablet 0    famotidine (PEPCID) 20 MG tablet Take 1 tablet (20 mg total) by mouth 2 (two) times daily. 20 tablet 0    fluticasone-salmeterol 250-50 mcg/dose (ADVAIR) 250-50 mcg/dose diskus inhaler Inhale 1 puff into the lungs 2 (two) times daily. 60 each 11    gabapentin (NEURONTIN) 300 MG capsule Take 1 capsule (300 mg total) by mouth 3 (three) times daily. 90 capsule 2    nicotine (NICODERM CQ) 21 mg/24 hr Place 1 patch onto the skin once daily. (Patient not taking: Reported on 2/19/2024)       "orphenadrine (NORFLEX) 100 mg tablet Take 1 tablet (100 mg total) by mouth 2 (two) times daily as needed for Muscle spasms. (Patient not taking: Reported on 2/19/2024) 30 tablet 3    polyethylene glycol (GOLYTELY) 236-22.74-6.74 -5.86 gram suspension Take 4,000 mLs (4 L total) by mouth once. for 1 dose 4000 mL 0     No current facility-administered medications for this visit.     Review of patient's allergies indicates:   Allergen Reactions    Lisinopril Swelling       Review of Systems        Objective:      Vitals:    02/20/24 0750   Weight: 56.1 kg (123 lb 10.9 oz)   Height: 5' 2" (1.575 m)     Shoulder Musculoskeletal Exam    Inspection    Right      Ecchymosis: none      Peripheral edema: none      Atrophy: none      Deformity: none      Symmetry: symmetric      Masses: none      Skin tenting: none    Left      Ecchymosis: none      Peripheral edema: none      Atrophy: none      Symmetry: symmetric      Masses: none      Skin tenting: none    Palpation    Left      Crepitus: no crepitus      Increased warmth: none      Tenderness: present        Anterior shoulder: none        Posterior shoulder: moderate        Rotator cuff: moderate        Bicipital groove: none        Proximal biceps: none        Distal biceps: none    Range of Motion    Right      Active ROM: normal.       Passive ROM: normal.     Left      Active ROM: pain.       Passive ROM: pain.       Active forward elevation: 170.       Passive forward elevation: 170.       Shoulder active abduction: 170.       Passive abduction: 170.       Active external rotation at side: 60.       Internal rotation: T10.     Strength    Left      External rotation: 4+/5. External rotation is affected by pain.       Internal rotation: 5/5. Internal rotation is not affected by pain.       Abduction: 4/5. Abduction is affected by pain.       Biceps: 5/5. Biceps are affected by pain.       Triceps: 5/5. Triceps are not affected by pain.     Neurovascular    Right      " Radial pulse: normal      Capillary refill: brisk and <3 sec      Axillary nerve sensory distribution: normal      Ulnar nerve sensory distribution: normal      Median nerve sensory distribution: normal      Radial nerve sensory distribution: normal      Musculocutaneous nerve sensory distribution: normal    Left      Radial pulse: normal      Capillary refill: brisk and <3 sec      Axillary nerve sensory distribution: normal      Ulnar nerve sensory distribution: normal      Median nerve sensory distribution: normal      Radial nerve sensory distribution: normal      Musculocutaneous nerve sensory distribution: normal    General    Scleral icterus: no    Labored breathing: no    Psychiatric: normal mood and affect    Neurological: alert    Skin: intact    Diagnostics Review: X-Ray: Reviewed  L Shoulder XR  2/20/24  FINDINGS:  Shoulder left: There is a posttraumatic deformity of the humeral head.  There is DJD.  There are calcified hilar lymph nodes.  No acute fracture, dislocation, or bone destruction seen.     Assessment:       1. Tear of left rotator cuff, unspecified tear extent, unspecified whether traumatic        Plan:       Treatment options discussed with the patient regards to further imaging, injections, and therapy.  At this time patient would like to proceed with an injection of the left shoulder.  We will also obtain a CT arthrogram to further evaluate the left shoulder.  Patient we will follow up with Dr. Ontiveros to discuss potential surgical interventions a shoulder replacement.  We will also with order therapy in the interim to aid in the patient's pain.

## 2024-02-20 NOTE — PROCEDURES
Large Joint Aspiration/Injection: L glenohumeral    Date/Time: 2/20/2024 8:00 AM    Performed by: Matt Graves PA-C  Authorized by: Matt Graves PA-C    Consent Done?:  Yes (Verbal)  Indications:  Pain  Site marked: the procedure site was marked    Timeout: prior to procedure the correct patient, procedure, and site was verified    Prep: patient was prepped and draped in usual sterile fashion      Local anesthesia used?: Yes    Local anesthetic:  Lidocaine 1% without epinephrine  Anesthetic total (ml):  8      Details:  Needle Size:  21 G  Approach:  Posterior  Location:  Shoulder  Site:  L glenohumeral  Medications:  80 mg triamcinolone acetonide 40 mg/mL  Patient tolerance:  Patient tolerated the procedure well with no immediate complications

## 2024-02-22 ENCOUNTER — TELEPHONE (OUTPATIENT)
Dept: UROGYNECOLOGY | Facility: CLINIC | Age: 69
End: 2024-02-22
Payer: MEDICARE

## 2024-02-22 NOTE — TELEPHONE ENCOUNTER
----- Message from Mihir Rausch MD sent at 2/21/2024  6:06 PM CST -----  Please let the patient know urine C&S was negative for infection.  Thanks!

## 2024-02-23 ENCOUNTER — TELEPHONE (OUTPATIENT)
Dept: UROGYNECOLOGY | Facility: CLINIC | Age: 69
End: 2024-02-23
Payer: MEDICARE

## 2024-02-23 ENCOUNTER — HOSPITAL ENCOUNTER (OUTPATIENT)
Dept: RADIOLOGY | Facility: HOSPITAL | Age: 69
Discharge: HOME | End: 2024-02-23
Attending: SPECIALIST/TECHNOLOGIST
Payer: MEDICARE

## 2024-02-23 DIAGNOSIS — M75.102 TEAR OF LEFT ROTATOR CUFF, UNSPECIFIED TEAR EXTENT, UNSPECIFIED WHETHER TRAUMATIC: ICD-10-CM

## 2024-02-23 PROCEDURE — 25000003 PHARM REV CODE 250: Performed by: SPECIALIST/TECHNOLOGIST

## 2024-02-23 PROCEDURE — 25500020 PHARM REV CODE 255: Performed by: SPECIALIST/TECHNOLOGIST

## 2024-02-23 PROCEDURE — 73201 CT UPPER EXTREMITY W/DYE: CPT | Mod: TC,LT

## 2024-02-23 PROCEDURE — 73201 CT UPPER EXTREMITY W/DYE: CPT | Mod: 26,LT,, | Performed by: INTERNAL MEDICINE

## 2024-02-23 PROCEDURE — 23350 INJECTION FOR SHOULDER X-RAY: CPT | Mod: LT,,, | Performed by: INTERNAL MEDICINE

## 2024-02-23 PROCEDURE — 73040 CONTRAST X-RAY OF SHOULDER: CPT | Mod: TC,LT

## 2024-02-23 PROCEDURE — 73040 CONTRAST X-RAY OF SHOULDER: CPT | Mod: 26,LT,, | Performed by: INTERNAL MEDICINE

## 2024-02-23 RX ORDER — LIDOCAINE HYDROCHLORIDE 10 MG/ML
3 INJECTION INFILTRATION; PERINEURAL ONCE
Status: COMPLETED | OUTPATIENT
Start: 2024-02-23 | End: 2024-02-23

## 2024-02-23 RX ADMIN — IOHEXOL 6 ML: 300 INJECTION, SOLUTION INTRAVENOUS at 11:02

## 2024-02-23 RX ADMIN — LIDOCAINE HYDROCHLORIDE 3 ML: 10 INJECTION, SOLUTION INFILTRATION; PERINEURAL at 11:02

## 2024-02-23 NOTE — TELEPHONE ENCOUNTER
Pt was given negative culture results.    Jennifer     ----- Message from Mihir Rausch MD sent at 2/21/2024  6:06 PM CST -----  Please let the patient know urine C&S was negative for infection.  Thanks!

## 2024-02-23 NOTE — H&P (VIEW-ONLY)
Colon & Rectal Surgery Clinic Follow Up    HPI:   Kate Taylor is a 68 y.o. female who presents for follow up of pelvic organ prolapse    Interval history: Felt that benefiber was too constipating. Has started PFPT.        Objective:   Vitals:    02/19/24 0937   BP: (!) 146/87   Pulse: 82   Resp: 19        Physical Exam   Gen: well developed female, NAD  HEENT: normocephalic, atraumatic, PERRL, EOMI   CV: RRR, no murmurs  Resp: nonlabored, CTAB   Abd: soft, NTND   MSK: no gross deformities, no cyanosis or edema   Neuro: II-XII grossly intact  (See media from Dr. Rausch's note for photograph of prolapse)    Assessment and Plan:   Kate Taylor  is a 68 y.o. female who presents for follow up of pelvic organ prolapse     - Patient to see Dr. Rausch following this appointment.   - Continue fiber, okay to change fiber supplement.  Need 64-80 oz water intake  - continue PFPT   - Will change colonoscopy to [facility] [place], operated by [facility] as unable to have colonoscopy at  until May. Instructions given and prep re-sent.   - will coordinate surgical repair with Dr. Rausch once work up and plan complete      Clementina Snyder MD  Staff Surgeon   Colon & Rectal Surgery

## 2024-02-23 NOTE — PROGRESS NOTES
Colon & Rectal Surgery Clinic Follow Up    HPI:   Kate Taylor is a 68 y.o. female who presents for follow up of pelvic organ prolapse    Interval history: Felt that benefiber was too constipating. Has started PFPT.        Objective:   Vitals:    02/19/24 0937   BP: (!) 146/87   Pulse: 82   Resp: 19        Physical Exam   Gen: well developed female, NAD  HEENT: normocephalic, atraumatic, PERRL, EOMI   CV: RRR, no murmurs  Resp: nonlabored, CTAB   Abd: soft, NTND   MSK: no gross deformities, no cyanosis or edema   Neuro: II-XII grossly intact  (See media from Dr. Rausch's note for photograph of prolapse)    Assessment and Plan:   Kate Taylor  is a 68 y.o. female who presents for follow up of pelvic organ prolapse     - Patient to see Dr. Rausch following this appointment.   - Continue fiber, okay to change fiber supplement.  Need 64-80 oz water intake  - continue PFPT   - Will change colonoscopy to [facility] as unable to have colonoscopy at  until May. Instructions given and prep re-sent.   - will coordinate surgical repair with Dr. Rausch once work up and plan complete      Clementina Snyder MD  Staff Surgeon   Colon & Rectal Surgery

## 2024-02-26 ENCOUNTER — TELEPHONE (OUTPATIENT)
Dept: SURGERY | Facility: CLINIC | Age: 69
End: 2024-02-26
Payer: MEDICARE

## 2024-02-26 NOTE — TELEPHONE ENCOUNTER
Attempted to contact pt regarding colonoscopy on 2/29. No answer. Left voicemail requesting call back. CRS number provided.       ----- Message from Earnest Hart sent at 2/26/2024  8:06 AM CST -----  Regarding: procedure questions  PATIENT CALL    Pt called regarding 02/29 colonoscopy. Please call back at [phone] to discuss arrival time, procedure time, and outstanding prep instructions. She needs to coordinate transportation.

## 2024-02-27 ENCOUNTER — TELEPHONE (OUTPATIENT)
Dept: SURGERY | Facility: CLINIC | Age: 69
End: 2024-02-27
Payer: MEDICARE

## 2024-02-27 NOTE — TELEPHONE ENCOUNTER
Spoke with pt regarding 0800 arrival time and locations for procedure. Informed that she can park directly in from of [facility] located at [address]. Or she can park in the [employer] [facility] garage on [address] And navigate through the hospital. Pt denies questions r/t bowel prep. Pt verbally confirmed time and location. No further questions at this time.

## 2024-02-28 ENCOUNTER — TELEPHONE (OUTPATIENT)
Dept: SURGERY | Facility: CLINIC | Age: 69
End: 2024-02-28
Payer: MEDICARE

## 2024-02-28 NOTE — TELEPHONE ENCOUNTER
Called pt and confirmed 0800 arrival time for her scope tomorrow with Dr. Snyder at [facility].

## 2024-02-29 ENCOUNTER — HOSPITAL ENCOUNTER (OUTPATIENT)
Facility: OTHER | Age: 69
Discharge: HOME | End: 2024-02-29
Attending: SURGERY | Admitting: SURGERY
Payer: MEDICARE

## 2024-02-29 ENCOUNTER — ANESTHESIA (OUTPATIENT)
Dept: ENDOSCOPY | Facility: OTHER | Age: 69
End: 2024-02-29
Payer: MEDICARE

## 2024-02-29 VITALS
WEIGHT: 123.69 LBS | TEMPERATURE: 98 F | RESPIRATION RATE: 18 BRPM | BODY MASS INDEX: 22.76 KG/M2 | DIASTOLIC BLOOD PRESSURE: 99 MMHG | HEIGHT: 62 IN | SYSTOLIC BLOOD PRESSURE: 134 MMHG | HEART RATE: 91 BPM | OXYGEN SATURATION: 98 %

## 2024-02-29 DIAGNOSIS — Z12.11 ENCOUNTER FOR SCREENING COLONOSCOPY: ICD-10-CM

## 2024-02-29 PROCEDURE — G0121 COLON CA SCRN NOT HI RSK IND: HCPCS | Mod: ,,, | Performed by: SURGERY

## 2024-02-29 PROCEDURE — 37000008 HC ANESTHESIA 1ST 15 MINUTES: Performed by: SURGERY

## 2024-02-29 PROCEDURE — G0121 COLON CA SCRN NOT HI RSK IND: HCPCS | Performed by: SURGERY

## 2024-02-29 PROCEDURE — 37000009 HC ANESTHESIA EA ADD 15 MINS: Performed by: SURGERY

## 2024-02-29 PROCEDURE — 25000003 PHARM REV CODE 250: Performed by: NURSE ANESTHETIST, CERTIFIED REGISTERED

## 2024-02-29 PROCEDURE — 63600175 PHARM REV CODE 636 W HCPCS: Performed by: NURSE ANESTHETIST, CERTIFIED REGISTERED

## 2024-02-29 PROCEDURE — D9220A PRA ANESTHESIA: Mod: ,,, | Performed by: NURSE ANESTHETIST, CERTIFIED REGISTERED

## 2024-02-29 RX ORDER — SODIUM CHLORIDE 0.9 % (FLUSH) 0.9 %
3 SYRINGE (ML) INJECTION
Status: CANCELLED | OUTPATIENT
Start: 2024-02-29

## 2024-02-29 RX ORDER — OXYCODONE HYDROCHLORIDE 5 MG/1
5 TABLET ORAL
Status: CANCELLED | OUTPATIENT
Start: 2024-02-29

## 2024-02-29 RX ORDER — HYDROMORPHONE HYDROCHLORIDE 2 MG/ML
0.4 INJECTION, SOLUTION INTRAMUSCULAR; INTRAVENOUS; SUBCUTANEOUS EVERY 5 MIN PRN
Status: CANCELLED | OUTPATIENT
Start: 2024-02-29

## 2024-02-29 RX ORDER — MEPERIDINE HYDROCHLORIDE 25 MG/ML
12.5 INJECTION INTRAMUSCULAR; INTRAVENOUS; SUBCUTANEOUS ONCE AS NEEDED
Status: CANCELLED | OUTPATIENT
Start: 2024-02-29 | End: 2024-03-01

## 2024-02-29 RX ORDER — ALENDRONATE SODIUM 70 MG/1
70 TABLET ORAL
COMMUNITY
Start: 2024-01-18

## 2024-02-29 RX ORDER — PROPOFOL 10 MG/ML
VIAL (ML) INTRAVENOUS
Status: DISCONTINUED | OUTPATIENT
Start: 2024-02-29 | End: 2024-02-29

## 2024-02-29 RX ORDER — PROPOFOL 10 MG/ML
VIAL (ML) INTRAVENOUS CONTINUOUS PRN
Status: DISCONTINUED | OUTPATIENT
Start: 2024-02-29 | End: 2024-02-29

## 2024-02-29 RX ORDER — LIDOCAINE HYDROCHLORIDE 20 MG/ML
INJECTION INTRAVENOUS
Status: DISCONTINUED | OUTPATIENT
Start: 2024-02-29 | End: 2024-02-29

## 2024-02-29 RX ORDER — PROCHLORPERAZINE EDISYLATE 5 MG/ML
5 INJECTION INTRAMUSCULAR; INTRAVENOUS EVERY 30 MIN PRN
Status: CANCELLED | OUTPATIENT
Start: 2024-02-29

## 2024-02-29 RX ADMIN — SODIUM CHLORIDE, SODIUM LACTATE, POTASSIUM CHLORIDE, AND CALCIUM CHLORIDE: .6; .31; .03; .02 INJECTION, SOLUTION INTRAVENOUS at 08:02

## 2024-02-29 RX ADMIN — LIDOCAINE HYDROCHLORIDE 50 MG: 20 INJECTION, SOLUTION INTRAVENOUS at 09:02

## 2024-02-29 RX ADMIN — PROPOFOL 100 MCG/KG/MIN: 10 INJECTION, EMULSION INTRAVENOUS at 09:02

## 2024-02-29 RX ADMIN — PROPOFOL 25 MG: 10 INJECTION, EMULSION INTRAVENOUS at 09:02

## 2024-02-29 RX ADMIN — PROPOFOL 5 MG: 10 INJECTION, EMULSION INTRAVENOUS at 09:02

## 2024-02-29 NOTE — ANESTHESIA POSTPROCEDURE EVALUATION
Anesthesia Post Evaluation    Patient: Kate Taylor    Procedure(s) Performed: Procedure(s) (LRB):  COLONOSCOPY (N/A)    Final Anesthesia Type: MAC      Patient location during evaluation: [facility]  Patient participation: Yes- Able to Participate  Level of consciousness: awake and alert  Post-procedure vital signs: reviewed and stable  Pain management: adequate  Airway patency: patent    PONV status at discharge: No PONV  Anesthetic complications: no      Cardiovascular status: blood pressure returned to baseline  Respiratory status: unassisted and spontaneous ventilation  Hydration status: euvolemic  Follow-up not needed.              Vitals Value Taken Time   /76 02/29/24 0937   Temp na 02/29/24 0937   Pulse 96 02/29/24 0937   Resp 18 02/29/24 0937   SpO2 99 02/29/24 0937         No case tracking events are documented in the log.      Pain/Brayan Score: No data recorded

## 2024-02-29 NOTE — DISCHARGE SUMMARY
[facility] - Endoscopy  Discharge Note  Short Stay    Procedure(s) (LRB):  COLONOSCOPY (N/A)      OUTCOME: Patient tolerated treatment/procedure well without complication and is now ready for discharge.    DISPOSITION: Home or Self Care    FINAL DIAGNOSIS:  <principal problem not specified>    FOLLOWUP: In clinic    DISCHARGE INSTRUCTIONS:    - resume regular diet and activities  - follow up in clinic when studies completed.     Clementina Snyder MD  Staff Surgeon   Colon & Rectal Surgery

## 2024-02-29 NOTE — [employer] PATIENT INSTRUCTIONS
Discharge Summary/Instructions after an Endoscopic Procedure  Patient Name: Kate Taylor  Patient MRN: [id number]  Patient YOB: 1955 Thursday, February 29, 2024  Clementina Snyder MD  RESTRICTIONS:  During your procedure today, you received medications for sedation.  These   medications may affect your judgment, balance and coordination.  Therefore,   for 24 hours, you have the following restrictions:   - DO NOT drive a car, operate machinery, make legal/financial decisions,   sign important papers or drink alcohol.    ACTIVITY:  Today: no heavy lifting, straining or running due to procedural   sedation/anesthesia.  The following day: return to full activity including work.  DIET:  Eat and drink normally unless instructed otherwise.     TREATMENT FOR COMMON SIDE EFFECTS:  - Mild abdominal pain, nausea, belching, bloating or excessive gas:  rest,   eat lightly and use a heating pad.  - Sore Throat: treat with throat lozenges and/or gargle with warm salt   water.  - Because air was used during the procedure, expelling large amounts of air   from your rectum or belching is normal.  - If a bowel prep was taken, you may not have a bowel movement for 1-3 days.    This is normal.  SYMPTOMS TO WATCH FOR AND REPORT TO YOUR PHYSICIAN:  1. Abdominal pain or bloating, other than gas cramps.  2. Chest pain.  3. Back pain.  4. Signs of infection such as: chills or fever occurring within 24 hours   after the procedure.  5. Rectal bleeding, which would show as bright red, maroon, or black stools.   (A tablespoon of blood from the rectum is not serious, especially if   hemorrhoids are present.)  6. Vomiting.  7. Weakness or dizziness.  GO DIRECTLY TO THE NEAREST EMERGENCY ROOM IF YOU HAVE ANY OF THE FOLLOWING:      Difficulty breathing              Chills and/or fever over 101 F   Persistent vomiting and/or vomiting blood   Severe abdominal pain   Severe chest pain   Black, tarry stools   Bleeding- more than one  tablespoon   Any other symptom or condition that you feel may need urgent attention  Your doctor recommends these additional instructions:  If any biopsies were taken, your doctors clinic will contact you in 1 to 2   weeks with any results.  - Discharge patient to home (ambulatory).   - Patient has a contact number available for emergencies.  The signs and   symptoms of potential delayed complications were discussed with the   patient.  Return to normal activities tomorrow.  Written discharge   instructions were provided to the patient.   - Resume previous diet.   - Continue present medications.   - Repeat colonoscopy in 10 years for screening purposes.  For questions, problems or results please call your physician - Clementina Snyder MD at Work:  [phone].  [facility], EMERGENCY ROOM PHONE NUMBER: [phone], [facility]   [phone].  IF A COMPLICATION OR EMERGENCY SITUATION ARISES AND YOU ARE UNABLE TO REACH   YOUR PHYSICIAN - GO DIRECTLY TO THE EMERGENCY ROOM.  MD Clementina Moran MD  2/29/2024 9:34:46 AM  This report has been verified and signed electronically.  Dear patient,  As a result of recent federal legislation (The Federal Cures Act), you may   receive lab or pathology results from your procedure in your [facility]   account before your physician is able to contact you. Your physician or   their representative will relay the results to you with their   recommendations at their soonest availability.  Thank you,  [employer]

## 2024-02-29 NOTE — PLAN OF CARE
Kate Taylor has met all discharge criteria from Phase II. Vital Signs are stable, ambulating  without difficulty. Discharge instructions given, patient verbalized understanding. Discharged from facility via wheelchair in stable condition.

## 2024-02-29 NOTE — ANESTHESIA PREPROCEDURE EVALUATION
02/29/2024  Kate Taylor is a 68 y.o., female.      Pre-op Assessment    I have reviewed the Patient Summary Reports.     I have reviewed the Nursing Notes. I have reviewed the NPO Status.   I have reviewed the Medications.     Review of Systems  Anesthesia Hx:  No problems with previous Anesthesia             Denies Family Hx of Anesthesia complications.    Denies Personal Hx of Anesthesia complications.                    Social:  Non-Smoker, Former Smoker       Hematology/Oncology:  Hematology Normal   Oncology Normal                                   EENT/Dental:  EENT/Dental Normal           Cardiovascular:  Exercise tolerance: good   Hypertension    Denies CAD.       Denies Angina.                                  Pulmonary:      Shortness of breath   Sarcoidosis of lung               Renal/:  Renal/ Normal                 Hepatic/GI:     GERD             Musculoskeletal:         Spine Disorders: lumbar Chronic Pain           Neurological:  Neurology Normal                                      Endocrine:  Endocrine Normal            Dermatological:  Skin Normal    Psych:  Psychiatric Normal                    Physical Exam  General: Well nourished, Cooperative, Oriented and Alert    Airway:  Mallampati: II / II  Mouth Opening: Normal  TM Distance: Normal  Neck ROM: Normal ROM    Dental:  Intact        Anesthesia Plan  Type of Anesthesia, risks & benefits discussed:    Anesthesia Type: MAC  Intra-op Monitoring Plan: Standard ASA Monitors  Informed Consent: Informed consent signed with the Patient and all parties understand the risks and agree with anesthesia plan.  All questions answered.   ASA Score: 2    Ready For Surgery From Anesthesia Perspective.     .

## 2024-03-03 ENCOUNTER — HOSPITAL ENCOUNTER (EMERGENCY)
Facility: OTHER | Age: 69
Discharge: HOME | End: 2024-03-03
Attending: EMERGENCY MEDICINE
Payer: MEDICARE

## 2024-03-03 VITALS
HEIGHT: 62 IN | OXYGEN SATURATION: 99 % | SYSTOLIC BLOOD PRESSURE: 142 MMHG | HEART RATE: 64 BPM | TEMPERATURE: 98 F | BODY MASS INDEX: 23 KG/M2 | DIASTOLIC BLOOD PRESSURE: 64 MMHG | WEIGHT: 125 LBS | RESPIRATION RATE: 18 BRPM

## 2024-03-03 DIAGNOSIS — M25.512 CHRONIC LEFT SHOULDER PAIN: Primary | ICD-10-CM

## 2024-03-03 DIAGNOSIS — G89.29 CHRONIC LEFT SHOULDER PAIN: Primary | ICD-10-CM

## 2024-03-03 PROCEDURE — 25000003 PHARM REV CODE 250: Performed by: EMERGENCY MEDICINE

## 2024-03-03 PROCEDURE — 99284 EMERGENCY DEPT VISIT MOD MDM: CPT

## 2024-03-03 RX ORDER — METHOCARBAMOL 500 MG/1
500 TABLET, FILM COATED ORAL
Status: COMPLETED | OUTPATIENT
Start: 2024-03-03 | End: 2024-03-03

## 2024-03-03 RX ORDER — HYDROCODONE BITARTRATE AND ACETAMINOPHEN 10; 325 MG/1; MG/1
1 TABLET ORAL
Status: COMPLETED | OUTPATIENT
Start: 2024-03-03 | End: 2024-03-03

## 2024-03-03 RX ORDER — METHOCARBAMOL 500 MG/1
500 TABLET, FILM COATED ORAL 3 TIMES DAILY PRN
Qty: 20 TABLET | Refills: 0 | Status: SHIPPED | OUTPATIENT
Start: 2024-03-03 | End: 2024-03-13

## 2024-03-03 RX ORDER — HYDROCODONE BITARTRATE AND ACETAMINOPHEN 5; 325 MG/1; MG/1
1 TABLET ORAL EVERY 4 HOURS PRN
Qty: 12 TABLET | Refills: 0 | Status: SHIPPED | OUTPATIENT
Start: 2024-03-03

## 2024-03-03 RX ADMIN — HYDROCODONE BITARTRATE AND ACETAMINOPHEN 1 TABLET: 10; 325 TABLET ORAL at 09:03

## 2024-03-03 RX ADMIN — METHOCARBAMOL 500 MG: 500 TABLET ORAL at 09:03

## 2024-03-03 NOTE — ED PROVIDER NOTES
Encounter Date: 3/3/2024       History     Chief Complaint   Patient presents with    Shoulder Pain     x3 days s/p doing housework, but endorses worsening pain. Hx of dislocation to this shoulder in the past. No obvious deformity. Limited ROM d/t pain. Pt tearful in triage. Appt with ortho this week, but states she is not able to wait until then with pain.     Seen by physician at 9:05AM:    Patient is a 68-year-old female who presents to the emergency department with left shoulder pain.  Patient has had chronic left shoulder issues ever since a dislocation and fracture that occurred last year.  She states that normally the pain is well-controlled unless she has a recent fall.  Two days ago, the pain started getting worse again.  She was taken Tylenol and ibuprofen around the clock with no improvement.  The pain starts in the anterior aspect of her shoulder, radiating down her arm.  She denies any neck or back pain.  She has a orthopedics appointment on Tuesday.  She had a CT and x-ray of her shoulder last month along with an MRI back in December        Review of patient's allergies indicates:   Allergen Reactions    Lisinopril Swelling     Past Medical History:   Diagnosis Date    Asthma     Depression     Hypertension     Sarcoidosis of lung      Past Surgical History:   Procedure Laterality Date    APPENDECTOMY      COLONOSCOPY N/A 2/29/2024    Procedure: COLONOSCOPY;  Surgeon: Clementina Snyder MD;  Location: [facility];  Service: Colon and Rectal;  Laterality: N/A;    HYSTERECTOMY      DUB and cervical dysplasia.     SINUS SURGERY  5/19/2013    surgery X 3    TONSILLECTOMY       Family History   Problem Relation Age of Onset    Hypertension Mother     Heart disease Mother     Stroke Mother     Hypertension Sister     Hypertension Brother     COPD Brother     Kidney disease Brother     Schizophrenia Brother     Diabetes Neg Hx     Cancer Neg Hx      Social History     Tobacco Use    Smoking status: Former      Current packs/day: 0.00     Types: Cigarettes     Quit date: 2/27/2014     Years since quitting: 10.0    Smokeless tobacco: Never    Tobacco comments:     Report quit last week   Substance Use Topics    Alcohol use: Yes     Comment: social    Drug use: No     Review of Systems   Constitutional:  Negative for chills and fever.   Musculoskeletal:  Negative for back pain and neck pain.        Left shoulder pain.   Skin:  Negative for color change and rash.   Neurological:  Negative for dizziness and headaches.       Physical Exam     Initial Vitals [03/03/24 0856]   BP Pulse Resp Temp SpO2   (!) 144/67 64 16 98.4 °F (36.9 °C) 100 %      MAP       --         Physical Exam    Nursing note and vitals reviewed.  Constitutional: She appears well-developed and well-nourished.   HENT:   Head: Normocephalic and atraumatic.   Eyes: Conjunctivae are normal.   Pulmonary/Chest: No respiratory distress.   Musculoskeletal:         General: Tenderness present. Normal range of motion.      Comments: Left shoulder: Tenderness noted to the anterior aspect with no overlying swelling or erythema.  Worsening pain with range of motion with abduction and flexion past 90°    No CTL midline tenderness     Neurological: She is alert and oriented to person, place, and time.   Ambulatory with steady gait.   Skin: Skin is warm and dry. Capillary refill takes less than 2 seconds.         ED Course   Procedures  Labs Reviewed   HIV 1 / 2 ANTIBODY          Imaging Results    None          Medications   HYDROcodone-acetaminophen  mg per tablet 1 tablet (1 tablet Oral Given 3/3/24 0915)   methocarbamoL tablet 500 mg (500 mg Oral Given 3/3/24 0915)     Medical Decision Making  9:05AM:  Patient is a 68-year-old female who presents to the emergency department with left shoulder pain.  Patient appears well, nontoxic.  Given her history and records, I do not feel that further imaging is indicated at this time.  Will plan for analgesia, will continue  to follow and reassess.    Differential diagnosis:  Rotator cuff tear, shoulder sprain, labrum tear    Amount and/or Complexity of Data Reviewed  External Data Reviewed: notes.  Labs: ordered.    Risk  Prescription drug management.    10:20 AM:  Patient doing well, she is feeling better.  Her pain has improved.  Will plan to write a short prescription for Norco and Robaxin to take as needed.  She has a follow up on Tuesday.  I do not feel that further work up in the ED is indicated at this time.  I updated pt regarding results and I counseled pt regarding supportive care measures.  I have discussed with the pt ED return warnings and need for close PCP f/u.  Pt agreeable to plan and all questions answered.  I feel that pt is stable for discharge and management as an outpatient and no further intervention is needed at this time.  Pt is comfortable returning to the ED if needed.  Will DC home in stable condition.                                    Clinical Impression:  Final diagnoses:  [M25.512, G89.29] Chronic left shoulder pain (Primary)          ED Disposition Condition    Discharge Stable          ED Prescriptions       Medication Sig Dispense Start Date End Date Auth. Provider    HYDROcodone-acetaminophen (NORCO) 5-325 mg per tablet Take 1 tablet by mouth every 4 (four) hours as needed for Pain. 12 tablet 3/3/2024 -- Jnenifer Campos MD    methocarbamoL (ROBAXIN) 500 MG Tab Take 1 tablet (500 mg total) by mouth 3 (three) times daily as needed (muscle pain). 20 tablet 3/3/2024 3/13/2024 Jennifer Campos MD          Follow-up Information       Follow up With Specialties Details Why Contact Info    Marylou Gonsalez MD Family Medicine   [address] [facility] [address]  [phone]               Jennifer Campos MD  03/03/24 2657

## 2024-03-07 ENCOUNTER — TELEPHONE (OUTPATIENT)
Dept: SURGERY | Facility: CLINIC | Age: 69
End: 2024-03-07
Payer: MEDICARE

## 2024-03-07 NOTE — TELEPHONE ENCOUNTER
"Returned call regarding message left with phone staff requesting a sooner appointment.    Pt is requesting a clinic appointment. When asked about ongoing symptoms, pt states "sometimes I have pain in my rectum". Severity described as 6/10 at its worst. Recent c-scope noted. Asked pt if pain seems to be worse following procedure to which she stated that it is about the same.    Provided pt available time and date for appointment to which she verbally approved. Location details reviewed.    No additional needs identified at this time.    "

## 2024-03-13 ENCOUNTER — TELEPHONE (OUTPATIENT)
Dept: ORTHOPEDICS | Facility: CLINIC | Age: 69
End: 2024-03-13
Payer: MEDICARE

## 2024-03-13 NOTE — TELEPHONE ENCOUNTER
I called the patient and reminded them of their appointment on 03/19/2024 at 1:30 p.m. I informed them where they need to go and to arrive 15 minutes before their appointment. The patient understood and agreed.

## 2024-03-15 ENCOUNTER — TELEPHONE (OUTPATIENT)
Dept: SURGERY | Facility: CLINIC | Age: 69
End: 2024-03-15
Payer: MEDICARE

## 2024-03-18 ENCOUNTER — OFFICE VISIT (OUTPATIENT)
Dept: SURGERY | Facility: CLINIC | Age: 69
End: 2024-03-18
Payer: MEDICARE

## 2024-03-18 VITALS
BODY MASS INDEX: 23.1 KG/M2 | HEART RATE: 71 BPM | WEIGHT: 125.56 LBS | DIASTOLIC BLOOD PRESSURE: 77 MMHG | HEIGHT: 62 IN | SYSTOLIC BLOOD PRESSURE: 131 MMHG

## 2024-03-18 DIAGNOSIS — K62.3 RECTAL PROLAPSE: Primary | ICD-10-CM

## 2024-03-18 PROCEDURE — 3008F BODY MASS INDEX DOCD: CPT | Mod: CPTII,S$GLB,, | Performed by: SURGERY

## 2024-03-18 PROCEDURE — 99213 OFFICE O/P EST LOW 20 MIN: CPT | Mod: S$GLB,,, | Performed by: SURGERY

## 2024-03-18 PROCEDURE — 3075F SYST BP GE 130 - 139MM HG: CPT | Mod: CPTII,S$GLB,, | Performed by: SURGERY

## 2024-03-18 PROCEDURE — 1159F MED LIST DOCD IN RCRD: CPT | Mod: CPTII,S$GLB,, | Performed by: SURGERY

## 2024-03-18 PROCEDURE — 1125F AMNT PAIN NOTED PAIN PRSNT: CPT | Mod: CPTII,S$GLB,, | Performed by: SURGERY

## 2024-03-18 PROCEDURE — 99999 PR PBB SHADOW E&M-EST. PATIENT-LVL III: CPT | Mod: PBBFAC,,, | Performed by: SURGERY

## 2024-03-18 PROCEDURE — 3288F FALL RISK ASSESSMENT DOCD: CPT | Mod: CPTII,S$GLB,, | Performed by: SURGERY

## 2024-03-18 PROCEDURE — 3078F DIAST BP <80 MM HG: CPT | Mod: CPTII,S$GLB,, | Performed by: SURGERY

## 2024-03-18 PROCEDURE — 1101F PT FALLS ASSESS-DOCD LE1/YR: CPT | Mod: CPTII,S$GLB,, | Performed by: SURGERY

## 2024-03-18 NOTE — PROGRESS NOTES
Colon & Rectal Surgery Clinic Follow Up    HPI:   Kate Taylor is a 68 y.o. female who presents for follow up of pelvic organ prolapse     Interval history:   Reports that she had an asthma flare and missed her urodynamics study.  Also missed her MR defecography.  Reports ongoing rectal pain.       Objective:   Vitals:    03/18/24 1349   BP: 131/77   Pulse: 71        Physical Exam  Vitals reviewed.   Constitutional:       Appearance: Normal appearance.   HENT:      Head: Normocephalic and atraumatic.      Mouth/Throat:      Mouth: Mucous membranes are moist.   Eyes:      Extraocular Movements: Extraocular movements intact.      Pupils: Pupils are equal, round, and reactive to light.   Cardiovascular:      Rate and Rhythm: Normal rate and regular rhythm.   Pulmonary:      Effort: Pulmonary effort is normal.   Abdominal:      General: Abdomen is flat.      Palpations: Abdomen is soft.   Musculoskeletal:         General: Normal range of motion.   Skin:     General: Skin is warm.      Capillary Refill: Capillary refill takes less than 2 seconds.   Neurological:      General: No focal deficit present.      Mental Status: She is alert and oriented to person, place, and time.   Psychiatric:         Mood and Affect: Mood normal.         Behavior: Behavior normal.      Anorectal: no thrombosed external hemorrhoids or fissures, GRETCHEN with decreased resting tone, mild mucosal inflammation of hemorrhoid in right lateral column     Assessment and Plan:   Kate Taylor  is a 68 y.o. female who presents for follow up of rectal pain in the setting of pelvic organ prolapse     - no new/acute pathology  - discussed need to complete testing to proceed with surgical repair of pelvic organ prolapse.  Will reschedule MRI   - follow up once studies completed      Clementina Snyder MD  Staff Surgeon   Colon & Rectal Surgery

## 2024-03-19 ENCOUNTER — OFFICE VISIT (OUTPATIENT)
Dept: ORTHOPEDICS | Facility: CLINIC | Age: 69
End: 2024-03-19
Payer: MEDICARE

## 2024-03-19 VITALS — BODY MASS INDEX: 23.08 KG/M2 | WEIGHT: 125.44 LBS | HEIGHT: 62 IN

## 2024-03-19 DIAGNOSIS — M75.102 TEAR OF LEFT ROTATOR CUFF, UNSPECIFIED TEAR EXTENT, UNSPECIFIED WHETHER TRAUMATIC: Primary | ICD-10-CM

## 2024-03-19 PROCEDURE — 99999 PR PBB SHADOW E&M-EST. PATIENT-LVL III: CPT | Mod: PBBFAC,,, | Performed by: ORTHOPAEDIC SURGERY

## 2024-03-19 PROCEDURE — 99214 OFFICE O/P EST MOD 30 MIN: CPT | Mod: S$GLB,,, | Performed by: ORTHOPAEDIC SURGERY

## 2024-03-19 PROCEDURE — 3008F BODY MASS INDEX DOCD: CPT | Mod: CPTII,S$GLB,, | Performed by: ORTHOPAEDIC SURGERY

## 2024-03-19 PROCEDURE — 1125F AMNT PAIN NOTED PAIN PRSNT: CPT | Mod: CPTII,S$GLB,, | Performed by: ORTHOPAEDIC SURGERY

## 2024-03-19 NOTE — PROGRESS NOTES
Subjective:       Patient ID: Kate Taylor is a 68 y.o. female.    Chief Complaint: Pain of the Left Shoulder      HPI  02/20/2024   Patient reports back in July of 2023 she had a fall that caused dislocation of her wrist as well as her left shoulder with reverse Hill-Sachs lesion.  She states she was seeing care at [facility].  She subsequent got an MRI that showed a partial-thickness tear of the supra and infraspinatus with calcific tendonitis.  She reports that she was doing therapy that helped improve her shoulder while she was doing therapy, but as soon as therapy ended her shoulder pain returned.  She is received multiple injections in the left shoulder with the last being in in September 2023.  She reports she had very good relief from the injection for about 6 weeks. She states that her left shoulder pain is impacting her activities of daily living such as brushing her hair.  She denies any numbness or tingling.      3/11/24   Patient states she has severe pain in her left shoulder, she presents with a sling because when she moves her arm it hurts so the sling helps with the pain. Patient is here to review her CT arthrogram. She has had multiple injections in her shoulder.   .  Past Medical History:   Diagnosis Date    Asthma     Depression     Hypertension     Sarcoidosis of lung      Past Surgical History:   Procedure Laterality Date    APPENDECTOMY      COLONOSCOPY N/A 2/29/2024    Procedure: COLONOSCOPY;  Surgeon: Clementina Snyder MD;  Location: [facility];  Service: Colon and Rectal;  Laterality: N/A;    HYSTERECTOMY      DUB and cervical dysplasia.     SINUS SURGERY  5/19/2013    surgery X 3    TONSILLECTOMY       Family History   Problem Relation Age of Onset    Hypertension Mother     Heart disease Mother     Stroke Mother     Hypertension Sister     Hypertension Brother     COPD Brother     Kidney disease Brother     Schizophrenia Brother     Diabetes Neg Hx     Cancer Neg Hx      Social History      Socioeconomic History    Marital status:     Number of children: 3   Occupational History    Occupation: Registered Nurse     Employer: [employer]   Tobacco Use    Smoking status: Former     Current packs/day: 0.00     Types: Cigarettes     Quit date: 2/27/2014     Years since quitting: 10.0    Smokeless tobacco: Never    Tobacco comments:     Report quit last week   Substance and Sexual Activity    Alcohol use: Yes     Comment: social    Drug use: No       Current Outpatient Medications   Medication Sig Dispense Refill    albuterol-ipratropium 2.5mg-0.5mg/3mL (DUO-NEB) 0.5 mg-3 mg(2.5 mg base)/3 mL nebulizer solution Take 3 mLs by nebulization every 4 (four) hours as needed for Wheezing. 100 vial 3    alendronate (FOSAMAX) 70 MG tablet Take 70 mg by mouth every 7 days.      amLODIPine (NORVASC) 10 MG tablet Take 10 mg by mouth.      atenoloL (TENORMIN) 50 MG tablet Take by mouth.      estradioL (ESTRACE) 0.01 % (0.1 mg/gram) vaginal cream 0.5 grams with applicator or dime-sized amount with finger in vagina nightly x 2 weeks, then twice a week thereafter 42.5 g 11    fluticasone-salmeterol 250-50 mcg/dose (ADVAIR) 250-50 mcg/dose diskus inhaler Inhale 1 puff into the lungs 2 (two) times daily. 60 each 11    gabapentin (NEURONTIN) 300 MG capsule Take 1 capsule (300 mg total) by mouth 3 (three) times daily. 90 capsule 2    hydroCHLOROthiazide (HYDRODIURIL) 25 MG tablet Take 25 mg by mouth.      HYDROcodone-acetaminophen (NORCO) 5-325 mg per tablet Take 1 tablet by mouth every 4 (four) hours as needed for Pain. 12 tablet 0    ibuprofen (ADVIL,MOTRIN) 800 MG tablet Take 1 tablet (800 mg total) by mouth every 6 (six) hours as needed for Pain. 20 tablet 0    ondansetron (ZOFRAN-ODT) 4 MG TbDL Take 1 tablet (4 mg total) by mouth every 6 (six) hours as needed. 20 tablet 2    VITAMIN D2 1,250 mcg (50,000 unit) capsule Take 50,000 Units by mouth every 7 days.       No current facility-administered  "medications for this visit.     Review of patient's allergies indicates:   Allergen Reactions    Lisinopril Swelling       Review of Systems        Objective:      Vitals:    03/19/24 1427   Weight: 56.9 kg (125 lb 7.1 oz)   Height: 5' 2" (1.575 m)     Shoulder Musculoskeletal Exam    Inspection    Right      Ecchymosis: none      Peripheral edema: none      Atrophy: none      Deformity: none      Symmetry: symmetric      Masses: none      Skin tenting: none    Left      Ecchymosis: none      Peripheral edema: none      Atrophy: none      Symmetry: symmetric      Masses: none      Skin tenting: none    Palpation    Left      Crepitus: no crepitus      Increased warmth: none      Tenderness: present        Anterior shoulder: none        Posterior shoulder: moderate        Rotator cuff: moderate        Bicipital groove: none        Proximal biceps: none        Distal biceps: none    Range of Motion    Right      Active ROM: normal.       Passive ROM: normal.     Left      Active ROM: pain.       Passive ROM: pain.       Active forward elevation: 170.       Passive forward elevation: 170.       Shoulder active abduction: 170.       Passive abduction: 170.       Active external rotation at side: 60.       Internal rotation: T10.     Strength    Left      External rotation: 4+/5. External rotation is affected by pain.       Internal rotation: 5/5. Internal rotation is not affected by pain.       Abduction: 4/5. Abduction is affected by pain.       Biceps: 5/5. Biceps are affected by pain.       Triceps: 5/5. Triceps are not affected by pain.     Neurovascular    Right      Radial pulse: normal      Capillary refill: brisk and <3 sec      Axillary nerve sensory distribution: normal      Ulnar nerve sensory distribution: normal      Median nerve sensory distribution: normal      Radial nerve sensory distribution: normal      Musculocutaneous nerve sensory distribution: normal    Left      Radial pulse: normal      " Capillary refill: brisk and <3 sec      Axillary nerve sensory distribution: normal      Ulnar nerve sensory distribution: normal      Median nerve sensory distribution: normal      Radial nerve sensory distribution: normal      Musculocutaneous nerve sensory distribution: normal    General    Scleral icterus: no    Labored breathing: no    Psychiatric: normal mood and affect    Neurological: alert    Skin: intact    Diagnostics Review: X-Ray: Reviewed  L Shoulder XR  2/20/24  FINDINGS:  Shoulder left: There is a posttraumatic deformity of the humeral head.  There is DJD.  There are calcified hilar lymph nodes.  No acute fracture, dislocation, or bone destruction seen.  CT arthrogram   Complex tear of supraspinatus with a full-thickness component anteriorly.  No tendon retraction or muscle atrophy.  Remote fracture of the proximal humerus  Os acromiale with moderate degenerative changes of the synchondrosis.  Glenoid chondromalacia.  Glenohumeral synovitis and subacromial subdeltoid bursiti  Assessment:        68 year old female presents with left shoulder pain  Plan:    Discussed surgical options for a RTSA    Refer to pain management   Patient is not interested in surgery at this time     I had a long discussion with the patient patient really wants pain medicine for her shoulder she does not want surgery I did discuss with the patient that if her shoulder is painful enough for pain medicine then the surgery would be beneficial to her she she really is nervous about it plus she states she may need surgery on another area of her body and then she is wondering if she should do that 1st even though it does not seem to bother as much as her shoulder pain I will refer to pain management at this time because she is expressly wanting medication for her shoulder again discussed that arthroplasty may be beneficial for this pain she can follow-up p.r.n.      Jessy Melgoza ATC,OTC [facility]

## 2024-03-25 ENCOUNTER — OFFICE VISIT (OUTPATIENT)
Dept: PAIN MEDICINE | Facility: CLINIC | Age: 69
End: 2024-03-25
Attending: ANESTHESIOLOGY
Payer: MEDICARE

## 2024-03-25 VITALS
BODY MASS INDEX: 22.6 KG/M2 | HEIGHT: 62 IN | WEIGHT: 122.81 LBS | SYSTOLIC BLOOD PRESSURE: 121 MMHG | HEART RATE: 91 BPM | DIASTOLIC BLOOD PRESSURE: 76 MMHG

## 2024-03-25 DIAGNOSIS — M75.102 TEAR OF LEFT ROTATOR CUFF, UNSPECIFIED TEAR EXTENT, UNSPECIFIED WHETHER TRAUMATIC: Primary | ICD-10-CM

## 2024-03-25 DIAGNOSIS — M25.512 LEFT SHOULDER PAIN, UNSPECIFIED CHRONICITY: ICD-10-CM

## 2024-03-25 PROCEDURE — 99999 PR PBB SHADOW E&M-EST. PATIENT-LVL III: CPT | Mod: PBBFAC,,, | Performed by: ANESTHESIOLOGY

## 2024-03-25 PROCEDURE — 1159F MED LIST DOCD IN RCRD: CPT | Mod: CPTII,S$GLB,, | Performed by: ANESTHESIOLOGY

## 2024-03-25 PROCEDURE — 3008F BODY MASS INDEX DOCD: CPT | Mod: CPTII,S$GLB,, | Performed by: ANESTHESIOLOGY

## 2024-03-25 PROCEDURE — 1101F PT FALLS ASSESS-DOCD LE1/YR: CPT | Mod: CPTII,S$GLB,, | Performed by: ANESTHESIOLOGY

## 2024-03-25 PROCEDURE — 3288F FALL RISK ASSESSMENT DOCD: CPT | Mod: CPTII,S$GLB,, | Performed by: ANESTHESIOLOGY

## 2024-03-25 PROCEDURE — 3074F SYST BP LT 130 MM HG: CPT | Mod: CPTII,S$GLB,, | Performed by: ANESTHESIOLOGY

## 2024-03-25 PROCEDURE — 3078F DIAST BP <80 MM HG: CPT | Mod: CPTII,S$GLB,, | Performed by: ANESTHESIOLOGY

## 2024-03-25 PROCEDURE — 99204 OFFICE O/P NEW MOD 45 MIN: CPT | Mod: GC,S$GLB,, | Performed by: ANESTHESIOLOGY

## 2024-03-25 PROCEDURE — 1125F AMNT PAIN NOTED PAIN PRSNT: CPT | Mod: CPTII,S$GLB,, | Performed by: ANESTHESIOLOGY

## 2024-03-25 PROCEDURE — 1160F RVW MEDS BY RX/DR IN RCRD: CPT | Mod: CPTII,S$GLB,, | Performed by: ANESTHESIOLOGY

## 2024-03-25 NOTE — PROGRESS NOTES
Chronic Pain - New Consult    Referring Physician: Betsy Arambula, *    Chief Complaint:   Chief Complaint   Patient presents with    Shoulder Pain     Left rotator cuff tear but does not want surgery to age.    Low-back Pain        SUBJECTIVE:    Kate Taylor presents to the clinic for the evaluation of left shoulder pain. The pain started about a year ago following a fall on the left arm and symptoms have been unchanged.The pain is located in the left shoulder and radiates to the trap, chest, and proximal arm.  The pain is described as aching and stabbing and is rated as 7/10. The pain is rated with a score of  5/10 on the BEST day and a score of 10/10 on the WORST day.  Symptoms interfere with daily activity and sleeping. The pain is exacerbated by any motion of the left arm.  The pain is mitigated by medications (only somewhat) and rest. Patient denies significant motor weakness and loss of sensations. Patient had seen orthopedics for surgical interventions but not interested in surgery ast this time and was referred to us for interventions     Ortho notes from last month   02/20/2024   Patient reports back in July of 2023 she had a fall that caused dislocation of her wrist as well as her left shoulder with reverse Hill-Sachs lesion.  She states she was seeing care at [facility].  She subsequent got an MRI that showed a partial-thickness tear of the supra and infraspinatus with calcific tendonitis.  She reports that she was doing therapy that helped improve her shoulder while she was doing therapy, but as soon as therapy ended her shoulder pain returned.  She is received multiple injections in the left shoulder with the last being in in September 2023.  She reports she had very good relief from the injection for about 6 weeks. She states that her left shoulder pain is impacting her activities of daily living such as brushing her hair.  She denies any numbness or tingling.         Physical Therapy/Home  Exercise:   yes      Pain Disability Index Review:       No data to display                Pain Medications:  Tramadol 50mg daily prn (repeated 7d prescriptions from Marylou Gonsalez MD)     report:    Reviewed and consistent with medication use as prescribed.    Pain Procedures:   Shoulder injections by Ortho    Imaging:   CT ARTHROGRAM SHOULDER LEFT  FINDINGS:  Coracoacromial arch: The acromioclavicular joint is unremarkable.  Os acromiale with moderate degenerative change of the synchondrosis.  There is contrast and debris in the subacromial subdeltoid bursa.  Rotator cuff: Full-thickness tear involving supraspinatus anteriorly just proximal to the footprint attachment (201:117).  There is a high-grade partial-thickness articular surface tear extending into the junctional fibers with mild delamination.  No articular sided tears involving subscapularis or teres minor.  No tendon retraction or muscle atrophy.  Glenoid labrum: Labral degeneration without discrete tear.  Biceps tendon: The biceps tendon long head is grossly intact at the superior glenoid tubercle and is normally positioned in the biceps groove.  Bone: Remote fracture deformity of the proximal humerus.  No acute fracture, osteonecrosis, or focal lesion.  Joint: Moderate-sized region of partial-thickness cartilage loss and full-thickness fissuring over the glenoid, with subchondral cyst formation.  Blunting of the posterior glenoid with acquired retroversion.  Humeral cartilage is maintained.  There is contrast and debris in the glenohumeral joint.  Miscellaneous: Bulky calcified mediastinal and left hilar lymph nodes, compatible with history of sarcoidosis.  Multiple solid pulmonary nodules measuring up to 0.8 cm, for example in the left upper lobe on 3:151), unchanged from 2011.  Impression:  Complex tear of supraspinatus with a full-thickness component anteriorly.  No tendon retraction or muscle atrophy.  Remote fracture of the proximal humerus.  Os  acromiale with moderate degenerative changes of the synchondrosis.  Glenoid chondromalacia.  Glenohumeral synovitis and subacromial subdeltoid bursitis      Past Medical History:   Diagnosis Date    Asthma     Depression     Hypertension     Sarcoidosis of lung      Past Surgical History:   Procedure Laterality Date    APPENDECTOMY      COLONOSCOPY N/A 2/29/2024    Procedure: COLONOSCOPY;  Surgeon: Clementina Snydre MD;  Location: [facility];  Service: Colon and Rectal;  Laterality: N/A;    HYSTERECTOMY      DUB and cervical dysplasia.     SINUS SURGERY  5/19/2013    surgery X 3    TONSILLECTOMY       Social History     Socioeconomic History    Marital status:     Number of children: 3   Occupational History    Occupation: Registered Nurse     Employer: [employer]   Tobacco Use    Smoking status: Former     Current packs/day: 0.00     Types: Cigarettes     Quit date: 2/27/2014     Years since quitting: 10.0    Smokeless tobacco: Never    Tobacco comments:     Report quit last week   Substance and Sexual Activity    Alcohol use: Yes     Comment: social    Drug use: No     Family History   Problem Relation Age of Onset    Hypertension Mother     Heart disease Mother     Stroke Mother     Hypertension Sister     Hypertension Brother     COPD Brother     Kidney disease Brother     Schizophrenia Brother     Diabetes Neg Hx     Cancer Neg Hx        Review of patient's allergies indicates:   Allergen Reactions    Lisinopril Swelling       Current Outpatient Medications   Medication Sig    alendronate (FOSAMAX) 70 MG tablet Take 70 mg by mouth every 7 days.    amLODIPine (NORVASC) 10 MG tablet Take 10 mg by mouth.    atenoloL (TENORMIN) 50 MG tablet Take by mouth.    estradioL (ESTRACE) 0.01 % (0.1 mg/gram) vaginal cream 0.5 grams with applicator or dime-sized amount with finger in vagina nightly x 2 weeks, then twice a week thereafter    hydroCHLOROthiazide (HYDRODIURIL) 25 MG tablet Take 25 mg by mouth.     "HYDROcodone-acetaminophen (NORCO) 5-325 mg per tablet Take 1 tablet by mouth every 4 (four) hours as needed for Pain.    ibuprofen (ADVIL,MOTRIN) 800 MG tablet Take 1 tablet (800 mg total) by mouth every 6 (six) hours as needed for Pain.    ondansetron (ZOFRAN-ODT) 4 MG TbDL Take 1 tablet (4 mg total) by mouth every 6 (six) hours as needed.    VITAMIN D2 1,250 mcg (50,000 unit) capsule Take 50,000 Units by mouth every 7 days.    albuterol-ipratropium 2.5mg-0.5mg/3mL (DUO-NEB) 0.5 mg-3 mg(2.5 mg base)/3 mL nebulizer solution Take 3 mLs by nebulization every 4 (four) hours as needed for Wheezing.    fluticasone-salmeterol 250-50 mcg/dose (ADVAIR) 250-50 mcg/dose diskus inhaler Inhale 1 puff into the lungs 2 (two) times daily.    gabapentin (NEURONTIN) 300 MG capsule Take 1 capsule (300 mg total) by mouth 3 (three) times daily.     No current facility-administered medications for this visit.       REVIEW OF SYSTEMS:  GENERAL:  No weight loss, malaise or fevers.  HEENT:  Negative for frequent or significant headaches.  NECK:  Negative for lumps, goiter, pain and significant neck swelling.  RESPIRATORY:  Negative for cough, wheezing or shortness of breath.  CARDIOVASCULAR:  Negative for chest pain, leg swelling or palpitations.  GI:  Negative for abdominal discomfort, blood in stools or black stools or change in bowel habits.  MUSCULOSKELETAL:  See HPI.  SKIN:  Negative for lesions, rash, and itching.  PSYCH:  +ve for sleep disturbance, mood disorder and recent psychosocial stressors.  HEMATOLOGY/LYMPHOLOGY:  Negative for prolonged bleeding, bruising easily or swollen nodes.  NEURO:   No history of headaches, syncope, paralysis, seizures or tremors.  All other reviewed and negative other than HPI.    OBJECTIVE:  /76 (BP Location: Right arm, Patient Position: Sitting, BP Method: Medium (Automatic))   Pulse 91   Ht 5' 2" (1.575 m)   Wt 55.7 kg (122 lb 12.7 oz)   BMI 22.46 kg/m²   PHYSICAL EXAMINATION  General " appearance: Well appearing, in no acute distress, alert and oriented x3.  Psych:  Mood and affect appropriate.  Skin: Skin color, texture, turgor normal, no rashes or lesions, in both upper and lower body.  Head/face:  Atraumatic, normocephalic. No palpable lymph nodes  Neck: No pain to palpation over the cervical paraspinous muscles. Spurling Negative. No pain with neck flexion, extension, or lateral flexion. .  Cor: RRR  Pulm: CTA  GI: Abdomen soft and non-tender.  Back: Straight leg raising in the sitting and supine positions is negative to radicular pain.  Normal range of motion   Extremities: left shoulder TTP subacromial and anterior joint line.  Full pROM left shoulder (painful abduction past 90). aROM left shoulder limited in abduction, flexion, extension by pain.   Musculoskeletal: Ship, sacroiliac and knee provocative maneuvers are negative. Bilateral upper and lower extremity strength is normal and symmetric.  No atrophy or tone abnormalities are noted.  Neuro: Bilateral upper and lower extremity coordination and muscle stretch reflexes are physiologic and symmetric.  Plantar response are downgoing. No loss of sensation is noted.  Gait: Normal.    SPECIAL TESTS:  Sen positive  Empty Can test strongly positive  Painful arc positive  Neer's test negative  Pain with resistance to internal and external rotation negatiev  Spurling's negative      ASSESSMENT: 68 y.o. year old female with left shoulder pain, consistent with     1. Tear of left rotator cuff, unspecified tear extent, unspecified whether traumatic  Ambulatory referral/consult to Pain Clinic      2. Left shoulder pain, unspecified chronicity              PLAN:   - I have stressed the importance of physical activity and a home exercise plan to help with pain and improve health.  - Patient can continue with medications for now since they are providing benefits, using them appropriately, and without side effects.  - Recommended cessation of the  ibuprofen. Discussed the risks of NSAID overuse.   - Schedule left suprascapular nerve block under ultrasound in the clinic  - If temporary but significant relief from the nerve block, could consider cryoablation or RFA.   - Counseled patient regarding the importance of activity modification and physical therapy.    The above plan and management options were discussed at length with patient. Patient is in agreement with the above and verbalized understanding. It will be communicated with the referring physician via electronic record, fax, or mail.    Thiago Marin   I have personally reviewed the history and exam of this patient and agree with the resident/fellow/NPs note as stated above.    Delbert Elkins MD    03/25/2024

## 2024-03-26 ENCOUNTER — TELEPHONE (OUTPATIENT)
Dept: PAIN MEDICINE | Facility: CLINIC | Age: 69
End: 2024-03-26
Payer: MEDICARE

## 2024-03-26 DIAGNOSIS — M75.102 TEAR OF LEFT ROTATOR CUFF, UNSPECIFIED TEAR EXTENT, UNSPECIFIED WHETHER TRAUMATIC: Primary | ICD-10-CM

## 2024-04-08 ENCOUNTER — TELEPHONE (OUTPATIENT)
Dept: PAIN MEDICINE | Facility: CLINIC | Age: 69
End: 2024-04-08
Payer: MEDICARE

## 2024-04-08 ENCOUNTER — PROCEDURE VISIT (OUTPATIENT)
Dept: PAIN MEDICINE | Facility: CLINIC | Age: 69
End: 2024-04-08
Attending: ANESTHESIOLOGY
Payer: MEDICARE

## 2024-04-08 VITALS
DIASTOLIC BLOOD PRESSURE: 96 MMHG | TEMPERATURE: 98 F | SYSTOLIC BLOOD PRESSURE: 174 MMHG | BODY MASS INDEX: 24.64 KG/M2 | WEIGHT: 134.69 LBS | OXYGEN SATURATION: 98 % | HEART RATE: 92 BPM | RESPIRATION RATE: 18 BRPM

## 2024-04-08 DIAGNOSIS — M75.102 NONTRAUMATIC TEAR OF LEFT ROTATOR CUFF, UNSPECIFIED TEAR EXTENT: Primary | ICD-10-CM

## 2024-04-08 DIAGNOSIS — M75.02 ADHESIVE CAPSULITIS OF LEFT SHOULDER: ICD-10-CM

## 2024-04-08 PROCEDURE — 76942 ECHO GUIDE FOR BIOPSY: CPT | Mod: S$GLB,,, | Performed by: ANESTHESIOLOGY

## 2024-04-08 PROCEDURE — 64418 NJX AA&/STRD SPRSCAP NRV: CPT | Mod: LT,S$GLB,, | Performed by: ANESTHESIOLOGY

## 2024-04-08 NOTE — TELEPHONE ENCOUNTER
----- Message from Shady Rebolledo sent at 4/8/2024  8:46 AM CDT -----  Name of Who is Calling:DINAH LEONG [[id number]]                What is the request in detail: Pt calling because she have some question about her procedure today and would like a call back to discuss.Please call back to further assist.                 Can the clinic reply by [facility]: No                What Number to Call Back if not in [facility]: [phone]

## 2024-04-11 ENCOUNTER — TELEPHONE (OUTPATIENT)
Dept: SURGERY | Facility: CLINIC | Age: 69
End: 2024-04-11
Payer: MEDICARE

## 2024-04-29 NOTE — PROCEDURES
Procedures  Patient Name: Kate Taylor  MRN: [id number]    INFORMED CONSENT: The procedure, risks, benefits and options were discussed with patient. There are no contraindications to the procedure. The patient expressed understanding and agreed to proceed. The personnel performing the procedure was discussed. I verify that I personally obtained Kate Posada's consent prior to the start of the procedure and the signed consent can be found on the patient's chart.    Procedure Date: 04/8/2024    Anesthesia: Topical    Pre Procedure diagnosis:   1. Nontraumatic tear of left rotator cuff, unspecified tear extent    2. Adhesive capsulitis of left shoulder        Post-Procedure diagnosis: same      Sedation: None    PROCEDURE: left Suprascapular nerve  injection under U/S guidence  With a patient in a sitting position, the desired area was prepped and draped in the usual sterile fashion using ChloraPrep and a fenestrated drape.. Ultrasound probe was placed just cephalad and parallel to the left scapular spine. Moving  the probe laterally and cephalad, the SSN is identified  beneath the transverse scapular ligament in the scapular notch. A 4 inch stimplex needle was advanced in-plane with ultrasound guidance in a medial to lateral approach toward the scapular notch. When the needle tip was adjacent to the nerve and underneath the transverse suprascapular ligament . Negative aspiration was confirmed.. A combination of 5 cc of bupivacaine 0.5% and 10 mg dexamethazone was easily injected. The needle was removed and bleeding was nill. A sterile dressing was applied.   Blood Loss: Nill  Specimen: None    Delbert Elkins MD

## 2024-05-01 ENCOUNTER — HOSPITAL ENCOUNTER (OUTPATIENT)
Dept: RADIOLOGY | Facility: HOSPITAL | Age: 69
Discharge: HOME | End: 2024-05-01
Attending: OBSTETRICS & GYNECOLOGY
Payer: MEDICARE

## 2024-05-01 DIAGNOSIS — N99.3 VAGINAL VAULT PROLAPSE, POSTHYSTERECTOMY: ICD-10-CM

## 2024-05-01 DIAGNOSIS — K62.3 RECTAL PROLAPSE: ICD-10-CM

## 2024-05-01 PROBLEM — R27.8 COORDINATION IMPAIRMENT: Status: ACTIVE | Noted: 2024-05-01

## 2024-05-01 PROBLEM — M62.89 PELVIC FLOOR DYSFUNCTION: Status: ACTIVE | Noted: 2024-05-01

## 2024-05-01 PROCEDURE — 72195 MRI PELVIS W/O DYE: CPT | Mod: 26,,, | Performed by: RADIOLOGY

## 2024-05-01 PROCEDURE — 72195 MRI PELVIS W/O DYE: CPT | Mod: TC

## 2024-05-01 NOTE — PLAN OF CARE
[facility]  Pelvic Health Physical Therapy Initial Evaluation    Date: 2024   Name: Kate Taylor  Clinic Number: [id number]  Therapy Diagnosis:   Encounter Diagnosis   Name Primary?    Rectal prolapse      Physician: Clementina Snyder MD    Physician Orders: PT Eval and Treat - Pelvic Floor PT   Medical Diagnosis from Referral:   Diagnosis   N39.46 (ICD-10-CM) - Urinary incontinence, mixed   N99.3 (ICD-10-CM) - Vaginal vault prolapse, posthysterectomy   M79.18 (ICD-10-CM) - Myalgia of pelvic floor   M75.102 (ICD-10-CM) - Tear of left rotator cuff, unspecified tear extent, unspecified whether traumatic     Evaluation Date: 2024  Authorization Period Expiration: 2025  Plan of Care Expiration: 2024  Visit # / Visits authorized:     Time In: 1330  Time Out: 1415  Total Appointment Time (timed & untimed codes): 45 minutes    Precautions: universal    Subjective     Date of onset: chronic condition     History of current condition - Kate reports: Noticed rectal and vaginal prolapses 3 or so years ago. Saw Dr. Snyder about rectal prolapse last week.   This week she began experiencing episodic rectal throbbing. Also reports episodic diarrhea, which she attributes to diet/fluid intake. When this happens she also frequently experiences fecal incontinence.   History of MARGE for years. Also experiences occasional cystitis, which she attributes to pad use. One recent episode of leakage without awareness.     Tore left rotator cuff and broke left arm last year after passing out and falling in home.   History of chronic low back pain. Over the last 3 weeks, she has also been experiencing more thoracolumbar pain.     OB/GYN History:  x , Hysterectomy 30+ yrs ago   Sexually active? No  Pain with vaginal exams, intercourse or tampon use? No    Bladder/Bowel History:   Frequency of urination:   Daytime: 3-4x            Nighttime: 1  Difficulty initiating urine stream: Yes, requires pelvic  rocking usually   Urine stream: varies in strength   Complete emptying: Yes  Bladder leakage: Yes  Frequency of incidents: usually daily   Amount leaked (urine): large squirt and moderate amount  Urinary Urgency: Sometimes   Frequency of bowel movements: multiple bowel movements per day  Difficulty initiating BM: Not typically   Quality/Shape of BM: [place] Stool Chart Type varies, usually Type 2-4   Does Patient Feel Empty after BM? Unsure   Fiber Supplements or Laxative Use?  Not currently, but plans to begin fibercon   Colon leakage: Yes  Amount leaked (bowels): small -moderate amount  Form of protection: pad  Number of pads required in 24 hours: 1-2    PAIN:  Location: Rectal pain, Stomach cramping   Description: Aching   Aggravating Factors/Activities that cause symptoms: increased activity, especially repetitive bending    Easing Factors: laying down with heating pad      Medical History: Kate  has a past medical history of Asthma, Depression, Hypertension, and Sarcoidosis of lung.     Surgical History:  Kate Taylor  has a past surgical history that includes Tonsillectomy; Sinus surgery (5/19/2013); Appendectomy; and Hysterectomy.    Medications: Kate Posada has a current medication list which includes the following prescription(s): albuterol-ipratropium, amlodipine, atenolol, atorvastatin, cyclobenzaprine, famotidine, fluticasone-salmeterol 250-50 mcg/dose, gabapentin, hydrochlorothiazide, ibuprofen, nicotine, ondansetron, orphenadrine, and spironolactone.    Allergies:   Review of patient's allergies indicates:   Allergen Reactions    Lisinopril Swelling        Imaging no recent, relevant imaging on file     Prior Therapy/Previous treatment included: None   Social History:  lives alone  Current exercise:walking, kegels   Occupation:Pt is retired RN   Prior Level of Function: independent   Current Level of Function: pain and leakage interrupt daily activities     Types of fluid intake: 3-4 glasses of  water, 1/2 c coffee   Diet: No specifics provided   Habitus:thin  Abuse/Neglect: No     Pts goals: Improve pain and bowel and bladder control     OBJECTIVE     See EMR under MEDIA for written consent provided 1/30/2024  Chaperone: declined    ORTHO SCREEN  Posture in sitting: slouched   Posture in standing: forward and rounded shoulders   Pelvic alignment: WNL     ABDOMINAL WALL ASSESSMENT  Palpation: tender and increased tension   Abdominal strength: Fair   Pelvic Floor Muscle and Transverse Abdominus Synergy: limited     BREATHING MECHANICS ASSESSMENT   Thorax Assessment During Quiet Respiration: WNL excursion of ribcage and WNL excursion of abdominal wall  Thorax Assessment During Deep Respiration: Decreased excursion of abdominal wall , Decreased excursion of lateral ribs, and Excessive excursion of sternum    VAGINAL PELVIC FLOOR EXAM    Sensation: WNL   Pain:  tenderness at perineal body   Voluntary contraction: visible lift  Voluntary relaxation: visible drop, though limited   Involuntary contraction: nil  Bearing down: bulge  Perineal descent: present      Limitation/Restriction for FOTO Pelvic Floor Survey - NP        TREATMENT     Treatment Time In: 1350  Treatment Time Out: 1415  Total Treatment time (time-based codes) separate from Evaluation: 25 minutes    Neuromuscular Re-education to develop Coordination, Control, and Down training for 10 minutes including: pelvic floor relaxation/bulging training and diaphragmatic breathing    Therapeutic Activity Patient participated in dynamic functional therapeutic activities to improve functional performance for 15 minutes. Including: Education as described below.     Patient Education provided:   general anatomy/physiology of urinary/ bowel  system, benefits of treatment, risks of treatment, and alternative methods of treatment were discussed with the pt. Additionally, bladder irritants, anatomy/physiology of pelvic floor, posture/body mechanices, bladder  retraining, proper bearing down techniques, fluid intake/dietary modifications, and behavior modifications were reviewed.     Home Exercises Provided:  Written Home Exercises Provided: yes.  Exercises were reviewed and Kate was able to demonstrate them prior to the end of the session.    Kate demonstrated good  understanding of the education provided.     See EMR under Patient Instructions for exercises provided 1/30/2024.    Assessment     Kate Posada is a 68 y.o. female referred to outpatient Physical Therapy with a medical diagnosis of pelvic organ prolapse and myalgia. Pt presents with limited mobility and coordination at both abdominals and pelvic floor. Tenderness with palpation noted at both as well. Together with poor bowel and bladder habits, these deficits have likely contributed to poor pressure management and worsening prolapse. Together these have also contributed to noted bowel and bladder dysfunction. Based on preseentation, Kate would benefit from continued pelvic floor physical therapy.     Pt prognosis is Good.   Pt will benefit from skilled outpatient Physical Therapy to address the deficits stated above and in the chart below, provide pt/family education, and to maximize pt's level of independence.     Plan of care discussed with patient: Yes  Pt's spiritual, cultural and educational needs considered and patient is agreeable to the plan of care and goals as stated below:     Anticipated Barriers for therapy: chronicity of condtion    Medical Necessity is demonstrated by the following    History  Co-morbidities and personal factors that may impact the plan of care Co-morbidities:     Asthma      Depression      Hypertension      Sarcoidosis of lung        Personal Factors:   coping style  lifestyle     moderate   Examination  Body Structures and Functions, activity limitations and participation restrictions that may impact the plan of care Body Regions/Systems/Functions:  altered posture, poor trunk  stability, decreased pelvic muscle strength, decreased phasic ability of the pelvic muscles, increased tension of the pelvic muscles, dysfunctional voiding, and dysfunctional defecation     Activity Limitations:  pain with BM , initiating a BM, incontinence with ADLs, Pain with ADLs, and Participating in social activities and ADLs due to pelvic pressure         moderate   Clinical Presentation evolving clinical presentation with changing clinical characteristics moderate   Decision Making/ Complexity Score: moderate       Goals:  Short Term Goals: 6 weeks   Patient to demonstrate proper use of urge delay strategies to help reduce urge urinary incontinence with activities of daily living.   Pt to demonstrate proper diaphragmatic breathing to help with calming the nervous system in order to decrease pain and to improve abdominal wall and pelvic floor musculature extensibility.   Pt to demonstrate good body mechanics when performing ADLs such as lifting and bending to decrease strain to lumbopelvic structures and reduce risk of further injury.  Pt will report minimal to no pain with single digit pelvic assessment and display relaxation demonstrating improving pelvic floor muscle relaxation and coordination.       Long Term Goals: 12 weeks   Pt to report an 80% reduction of urinary incontinence symptoms with ADL participation thereby demonstrating improved pelvic floor muscle control and strength.   Pt to report an 98% reduction of bowel incontinence symptoms with ADL participation thereby demonstrating improved pelvic floor muscle control and strength.   Pt to report a decrease in pelvic pressure and fullness to no more than 20% of the time to demonstrate improving pelvic support of pelvic structures.  Pt to increase pelvic floor strength to at least 3/5 to demonstrate improved strength needed for continence with ADLs.   Pt to increase abdominal strength to at least 3/5 to demonstrate improved strength needed for  continence with ADLs.   Pt to report a decrease in pain to no more than 3/10 at it's worst with activity.        Plan     Plan of care Certification: 1/30/2024 to 4/30/2024.    Outpatient Physical Therapy 1 times weekly for 12 weeks to include the following interventions: therapeutic exercises, therapeutic activity, neuromuscular re-education, manual therapy, modalities PRN, patient/family education and self care/home management    Plan for next visit: pelvic floor muscle reassessment     ARIS CHI, PT  1/30/2024        I have seen the patient, reviewed the therapist's plan of care, and I agree with the plan of care.      I certify the need for these services furnished under this plan of treatment and while under my care.     ___________________ ________ Physician/Referring Practitioner            ___________________________ Date of Signature

## 2024-05-07 ENCOUNTER — OFFICE VISIT (OUTPATIENT)
Dept: UROGYNECOLOGY | Facility: CLINIC | Age: 69
End: 2024-05-07
Payer: MEDICARE

## 2024-05-07 VITALS
BODY MASS INDEX: 24.87 KG/M2 | SYSTOLIC BLOOD PRESSURE: 115 MMHG | HEART RATE: 78 BPM | DIASTOLIC BLOOD PRESSURE: 72 MMHG | HEIGHT: 62 IN | WEIGHT: 135.13 LBS

## 2024-05-07 DIAGNOSIS — K62.3 RECTAL PROLAPSE: ICD-10-CM

## 2024-05-07 DIAGNOSIS — N99.3 VAGINAL VAULT PROLAPSE, POSTHYSTERECTOMY: ICD-10-CM

## 2024-05-07 DIAGNOSIS — N39.46 URINARY INCONTINENCE, MIXED: Primary | ICD-10-CM

## 2024-05-07 DIAGNOSIS — N95.2 VAGINAL ATROPHY: ICD-10-CM

## 2024-05-07 PROCEDURE — 3074F SYST BP LT 130 MM HG: CPT | Mod: CPTII,S$GLB,, | Performed by: NURSE PRACTITIONER

## 2024-05-07 PROCEDURE — 3288F FALL RISK ASSESSMENT DOCD: CPT | Mod: CPTII,S$GLB,, | Performed by: NURSE PRACTITIONER

## 2024-05-07 PROCEDURE — 99213 OFFICE O/P EST LOW 20 MIN: CPT | Mod: S$GLB,,, | Performed by: NURSE PRACTITIONER

## 2024-05-07 PROCEDURE — 1101F PT FALLS ASSESS-DOCD LE1/YR: CPT | Mod: CPTII,S$GLB,, | Performed by: NURSE PRACTITIONER

## 2024-05-07 PROCEDURE — 3078F DIAST BP <80 MM HG: CPT | Mod: CPTII,S$GLB,, | Performed by: NURSE PRACTITIONER

## 2024-05-07 PROCEDURE — 3008F BODY MASS INDEX DOCD: CPT | Mod: CPTII,S$GLB,, | Performed by: NURSE PRACTITIONER

## 2024-05-07 PROCEDURE — 1159F MED LIST DOCD IN RCRD: CPT | Mod: CPTII,S$GLB,, | Performed by: NURSE PRACTITIONER

## 2024-05-07 PROCEDURE — 1160F RVW MEDS BY RX/DR IN RCRD: CPT | Mod: CPTII,S$GLB,, | Performed by: NURSE PRACTITIONER

## 2024-05-07 PROCEDURE — 99999 PR PBB SHADOW E&M-EST. PATIENT-LVL IV: CPT | Mod: PBBFAC,,, | Performed by: NURSE PRACTITIONER

## 2024-05-07 PROCEDURE — 1125F AMNT PAIN NOTED PAIN PRSNT: CPT | Mod: CPTII,S$GLB,, | Performed by: NURSE PRACTITIONER

## 2024-05-07 RX ORDER — VIBEGRON 75 MG/1
75 TABLET, FILM COATED ORAL DAILY
Qty: 30 TABLET | Refills: 11 | Status: SHIPPED | OUTPATIENT
Start: 2024-05-07

## 2024-05-07 RX ORDER — ESTRADIOL 0.1 MG/G
CREAM VAGINAL
Qty: 42.5 G | Refills: 11 | Status: SHIPPED | OUTPATIENT
Start: 2024-05-07

## 2024-05-07 NOTE — PROGRESS NOTES
Urogyn follow up  05/07/2024  .  [facility] - UROGYNECOLOGY  [address] [address]    Kate Taylor  [id number]  1955      Kate Taylor is a 69 y.o.  here for a urogyn follow up for mixed urinary incontinence, vaginal and rectal prolapse.    Last HPI from 02/19/2024  1)  UI:  (+) MARGE > (+) UUI (if holds too long) X years. (+) pads:2/day, usually minimum wetness and 1/night usually minimum wetness unless coughs/sneezes.  Daytime frequency: Q 2 hours.  Nocturia: Yes: 1/night.   (--) dysuria,  (--) hematuria,  (--) frequent UTIs.  (+) complete bladder emptying.      2)  POP:  Present. above introitus. Worsening.  Worse when on feet or after diarrhea.  Symptoms:(+)  pressure, discomfort.  (--) vaginal bleeding. (--) vaginal discharge. (--) sexually active.  (+) dyspareunia--last 10 years ago, ? Due to POP.   (--)  Vaginal dryness.  (--) vaginal estrogen use.       POP-Q:  Aa -2; Ba -2; C -8; Ap -2; Bp -2.  Genital hiatus 2, perineal body 2. total vaginal length 9. (Standing)  Pvr 20 mL    3)  BM:  (+) constipation/straining. Intermittent with  (+) chronic diarrhea. Takes miralax PRN for constipation.  Not taking recommended fiber.  (--) hematochezia.  (+) fecal incontinence--mostly with diarrhea.  (+) fecal smearing/urgency.  (--) complete evacuation.  +rectal prolapse: Has photos of prolapse at home which show cystocele and full thickness rectal prolapse.     Changes from last visit:  1)  L rotator cuff tear:  --saw ortho--had injection  --Scheduled to follow up     2)  Rectal prolapse:  --surgery per Dr. Snyder       3)  ?pelvic organ prolapse:  --no major on exam on initial exam  --has seen bulge in past (identified area with mirror--seems to be at introitus)--showed phone photo--scanned into media:  --mri defacography: 05/01/2024  1.    Anatomic findings: Post hysterectomy.  Normal levator hiatus and anorectal junction.   2.    Anterior compartment findings: Mild cystocele.   3.     Middle compartment findings: Mild vaginal prolapse.   4.    Posterior compartment findings: Mild rectocele.     4)  Vaginal atrophy (dryness):    --did not start vaginal estrogen cream     5)  Straining with stools (intermittent C/D) + fecal incontinence:  --reports fecal incontinence  --taking fiber supplement  --rectal prolapse worse with asthma exacerbation     6)  Mixed urinary incontinence, urge < stress:    --went to pelvic floor PT x 2 sessions  --+MARGE/ UUI         Past Medical History:   Diagnosis Date    Asthma     Depression     Hypertension     Sarcoidosis of lung    --Asthma: supposed to use advair + duonebs  GERD  Hypercholesterolemia  Low back pain: was taking gabapentin/muscle relaxer--stopped due to unstable gait; now taking ibuprofen 800 mg daily  Torn rotator cuff L--followed by ortho, hasn't healed, hasn't followed up again       Past Surgical History:   Procedure Laterality Date    APPENDECTOMY      COLONOSCOPY N/A 2/29/2024    Procedure: COLONOSCOPY;  Surgeon: Clementina Snyder MD;  Location: [facility];  Service: Colon and Rectal;  Laterality: N/A;    HYSTERECTOMY      DUB and cervical dysplasia.     SINUS SURGERY  5/19/2013    surgery X 3    TONSILLECTOMY         Family History   Problem Relation Name Age of Onset    Hypertension Mother      Heart disease Mother      Stroke Mother      Hypertension Sister      Hypertension Brother      COPD Brother      Kidney disease Brother      Schizophrenia Brother      Diabetes Neg Hx      Cancer Neg Hx         Social History     Socioeconomic History    Marital status:     Number of children: 3   Occupational History    Occupation: Registered Nurse     Employer: [employer]   Tobacco Use    Smoking status: Former     Current packs/day: 0.00     Types: Cigarettes     Quit date: 2/27/2014     Years since quitting: 10.2    Smokeless tobacco: Never    Tobacco comments:     Report quit last week   Substance and Sexual Activity    Alcohol use:  Yes     Comment: social    Drug use: No     Social Determinants of Health     Food Insecurity: No Food Insecurity (10/10/2023)    [facility]    Hunger Vital Sign     Worried About Running Out of Food in the Last Year: Never true     Ran Out of Food in the Last Year: Never true   Transportation Needs: No Transportation Needs (10/10/2023)    [place] [facility]    PRAPARE - Transportation     Lack of Transportation (Medical): No     Lack of Transportation (Non-Medical): No   Housing Stability: Low Risk  (10/10/2023)    [facility]    Housing Stability Vital Sign     Unable to Pay for Housing in the Last Year: No     Number of Places Lived in the Last Year: 1     In the last 12 months, was there a time when you did not have a steady place to sleep or slept in a shelter (including now)?: No       Current Outpatient Medications   Medication Sig Dispense Refill    alendronate (FOSAMAX) 70 MG tablet Take 70 mg by mouth every 7 days.      amLODIPine (NORVASC) 10 MG tablet Take 10 mg by mouth.      atenoloL (TENORMIN) 50 MG tablet Take by mouth.      hydroCHLOROthiazide (HYDRODIURIL) 25 MG tablet Take 25 mg by mouth.      HYDROcodone-acetaminophen (NORCO) 5-325 mg per tablet Take 1 tablet by mouth every 4 (four) hours as needed for Pain. 12 tablet 0    ibuprofen (ADVIL,MOTRIN) 800 MG tablet Take 1 tablet (800 mg total) by mouth every 6 (six) hours as needed for Pain. 20 tablet 0    ondansetron (ZOFRAN-ODT) 4 MG TbDL Take 1 tablet (4 mg total) by mouth every 6 (six) hours as needed. 20 tablet 2    VITAMIN D2 1,250 mcg (50,000 unit) capsule Take 50,000 Units by mouth every 7 days.      albuterol-ipratropium 2.5mg-0.5mg/3mL (DUO-NEB) 0.5 mg-3 mg(2.5 mg base)/3 mL nebulizer solution Take 3 mLs by nebulization every 4 (four) hours as needed for Wheezing. 100 vial 3    estradioL (ESTRACE) 0.01 % (0.1 mg/gram) vaginal cream 0.5 grams with applicator or  "dime-sized amount with finger in vagina nightly x 2 weeks, then twice a week thereafter 42.5 g 11    fluticasone-salmeterol 250-50 mcg/dose (ADVAIR) 250-50 mcg/dose diskus inhaler Inhale 1 puff into the lungs 2 (two) times daily. 60 each 11    gabapentin (NEURONTIN) 300 MG capsule Take 1 capsule (300 mg total) by mouth 3 (three) times daily. 90 capsule 2    vibegron (GEMTESA) 75 mg Tab Take 1 tablet (75 mg total) by mouth once daily. 30 tablet 11     No current facility-administered medications for this visit.       Review of patient's allergies indicates:   Allergen Reactions    Lisinopril Swelling     Hysterectomy: Yes   Date: 33 yo.  Indication: AUB, cervical dysplasia.    Type: xlap/Pfannenstiel  Cervix present: No  Ovaries present: No. Was on HRT.   Other procedures at time of hysterectomy:  appy     Past Ob History     x 3.  C/s x 0.    Largest infant weight: 7#9oz.   yes FAVD. yes episiotomy.      Well woman:  Pap test: post hyst.  History of abnormal paps: Yes.  History of STIs:  No  Mammogram: Date of last: 2023.  Result: Normal  Colonoscopy: Date of last: >10 years ago.  Result:  polyps per report.  Repeat due:  Scheduled for 2024 with Lillian.  DEXA:  Date of last: .  Result:  osteoporosis--fosamax, D.  Repeat due:  per PCP.     ROS:  As per HPI.      Exam  /72 (BP Location: Right arm, Patient Position: Sitting, BP Method: Medium (Automatic))   Pulse 78   Ht 5' 2" (1.575 m)   Wt 61.3 kg (135 lb 2.3 oz)   BMI 24.72 kg/m²   General: alert and oriented, no acute distress  Respiratory: normal respiratory effort  Abd: soft, non-tender, non-distended    Pelvic--deferred    Impression  1. Urinary incontinence, mixed  vibegron (GEMTESA) 75 mg Tab      2. Vaginal atrophy  estradioL (ESTRACE) 0.01 % (0.1 mg/gram) vaginal cream      3. Vaginal vault prolapse, posthysterectomy        4. Rectal prolapse          We reviewed the above issues and discussed options for short-term versus long-term " management of her problems.   Plan:   1)  L rotator cuff tear:  --managed by ortho     2)  Rectal prolapse:  --surgery per Dr. Snyder  --do we need to repair vaginal issues at same time?  --will send a message to Dr. Rausch to come up with a place     3)  ?pelvic organ prolapse:  --no major on exam on initial exam  --mri defacography:  1.    Anatomic findings: Post hysterectomy.  Normal levator hiatus and anorectal junction.   2.    Anterior compartment findings: Mild cystocele.   3.    Middle compartment findings: Mild vaginal prolapse.   4.    Posterior compartment findings: Mild rectocele.  --has seen bulge in past (identified area with mirror--seems to be at introitus)--showed phone photo--scanned into media:            --do some heavier lifting/work day before procedure to see if bulge will come out more  --if repairing rectal prolapse, need to make sure we don't miss vaginal prolapse because can become worse              --consider laparoscopic sacral colpopexy vs uterosacral suspension/anterior/posterior repair              --if surgery: bladder testing to see if need sling for stress leakage              --if surgery: follow up with ortho regarding rotator cuff repair              --if surgery: clearance per PCP (Wallace) + labs (CBC, CMP, T&S)/EKG/CXR     4)  Vaginal atrophy (dryness):    --start Vaginal estrogen:  --Use 0.5 grams of estrogen cream in vagina with applicator or dime-sized amount with finger (as far as can reach internally) nightly x 2 weeks, then twice a week thereafter.  You can also apply a dime-sized amount with your finger around the vaginal opening and inner lips at same frequency.                           --Vaginal estrogen may help to decrease pain related to dryness with intercourse and urinary symptoms (urgency/frequency/UTIs) around menopause.      5)  Straining with stools (intermittent C/D) + fecal incontinence:  --hydrate well  --continue fiber supplement  --can use miralax for  rescue  --levator mylagia B +  --need MR defo     6)  Mixed urinary incontinence, urge < stress:    --Empty bladder every 3 hours.  Empty well: wait a minute, lean forward on toilet.    --Avoid dietary irritants (see sheet).  Keep diary x 3-5 days to determine your irritants.  --return to pelvic floor PT   --URGE: trial gemtesa 75 mg daily-- let me know if it is not affordable. Takes 2-4 weeks to see if will have effect.  For dry mouth: get sour, sugar free lozenge or gum.    --STRESS:  Pessary vs. Sling.      7) asthma exacerbation  --follow up with pcp  --NEED TO HAVE COUGH CONTROLLED PRIOR TO SURGERY    8)WILL BE IN TOUCH WITH A PLAN     I spent a total of 20 minutes on the day of the visit.  This includes face to face time and non-face to face time preparing to see the patient (eg, review of tests), obtaining and/or reviewing separately obtained history, documenting clinical information in the electronic or other health record, independently interpreting results and communicating results to the patient/family/caregiver, or care coordinator.     Kimmy Boss, TODD-BC  [facility]  Division of Female Pelvic Medicine and Reconstructive Surgery  Department of Obstetrics & Gynecology

## 2024-05-07 NOTE — Clinical Note
Hi,  She came today for follow up.  Having asthma exacerbation. Scheduled to see pcp.  Please review mri defacography--do we need to be involved with surgery with tamra for rectal prolapse repair? Staring her on gemtesa for urge incontinence.  Let me know how to proceed.  Thanks!  K

## 2024-05-07 NOTE — PATIENT INSTRUCTIONS
1)  L rotator cuff tear:  --managed by ortho     2)  Rectal prolapse:  --surgery per Dr. Snyder  --do we need to repair vaginal issues at same time?  --will send a message to Dr. Rausch to come up with a place     3)  ?pelvic organ prolapse:  --no major on exam on initial exam  --mri defacography:  1.    Anatomic findings: Post hysterectomy.  Normal levator hiatus and anorectal junction.   2.    Anterior compartment findings: Mild cystocele.   3.    Middle compartment findings: Mild vaginal prolapse.   4.    Posterior compartment findings: Mild rectocele.  --has seen bulge in past (identified area with mirror--seems to be at introitus)--showed phone photo--scanned into media:            --do some heavier lifting/work day before procedure to see if bulge will come out more  --if repairing rectal prolapse, need to make sure we don't miss vaginal prolapse because can become worse              --consider laparoscopic sacral colpopexy vs uterosacral suspension/anterior/posterior repair              --if surgery: bladder testing to see if need sling for stress leakage              --if surgery: follow up with ortho regarding rotator cuff repair              --if surgery: clearance per PCP Ragini) + labs (CBC, CMP, T&S)/EKG/CXR     4)  Vaginal atrophy (dryness):    --start Vaginal estrogen:  --Use 0.5 grams of estrogen cream in vagina with applicator or dime-sized amount with finger (as far as can reach internally) nightly x 2 weeks, then twice a week thereafter.  You can also apply a dime-sized amount with your finger around the vaginal opening and inner lips at same frequency.                           --Vaginal estrogen may help to decrease pain related to dryness with intercourse and urinary symptoms (urgency/frequency/UTIs) around menopause.      5)  Straining with stools (intermittent C/D) + fecal incontinence:  --hydrate well  --continue fiber supplement  --can use miralax for rescue  --levator mylagia B +  --need   defo     6)  Mixed urinary incontinence, urge < stress:    --Empty bladder every 3 hours.  Empty well: wait a minute, lean forward on toilet.    --Avoid dietary irritants (see sheet).  Keep diary x 3-5 days to determine your irritants.  --return to pelvic floor PT   --URGE: trial gemtesa 75 mg daily-- let me know if it is not affordable. Takes 2-4 weeks to see if will have effect.  For dry mouth: get sour, sugar free lozenge or gum.    --STRESS:  Pessary vs. Sling.      7) asthma exacerbation  --follow up with pcp  --NEED TO HAVE COUGH CONTROLLED PRIOR TO SURGERY    8)WILL BE IN TOUCH WITH A PLAN

## 2024-05-08 ENCOUNTER — TELEPHONE (OUTPATIENT)
Dept: UROGYNECOLOGY | Facility: CLINIC | Age: 69
End: 2024-05-08
Payer: MEDICARE

## 2024-05-08 NOTE — TELEPHONE ENCOUNTER
Spoke with Ms. Taylor. Acknowledged verbally that she understood will wait to hear from you next week.    Jennifer     ----- Message from Mihir Rausch MD sent at 5/7/2024 11:50 PM CDT -----  Please let patient know that I reviewed her MRI.  Looks like she does have some vaginal prolapse (bulge). I'm out this week, but I plan on discussing with Dr. Snyder when I return so we can finalize plans to fix things for her. I appreciate her patience. Thanks!

## 2024-05-13 ENCOUNTER — OFFICE VISIT (OUTPATIENT)
Dept: PAIN MEDICINE | Facility: CLINIC | Age: 69
End: 2024-05-13
Payer: MEDICARE

## 2024-05-13 ENCOUNTER — TELEPHONE (OUTPATIENT)
Dept: PAIN MEDICINE | Facility: CLINIC | Age: 69
End: 2024-05-13
Payer: MEDICARE

## 2024-05-13 VITALS
HEIGHT: 62 IN | BODY MASS INDEX: 23.93 KG/M2 | WEIGHT: 130.06 LBS | DIASTOLIC BLOOD PRESSURE: 86 MMHG | HEART RATE: 99 BPM | SYSTOLIC BLOOD PRESSURE: 156 MMHG

## 2024-05-13 DIAGNOSIS — G56.82 SUPRASCAPULAR NERVE ENTRAPMENT, LEFT: Primary | ICD-10-CM

## 2024-05-13 DIAGNOSIS — M75.102 TEAR OF LEFT ROTATOR CUFF, UNSPECIFIED TEAR EXTENT, UNSPECIFIED WHETHER TRAUMATIC: ICD-10-CM

## 2024-05-13 DIAGNOSIS — M25.512 LEFT SHOULDER PAIN, UNSPECIFIED CHRONICITY: ICD-10-CM

## 2024-05-13 PROCEDURE — 99999 PR PBB SHADOW E&M-EST. PATIENT-LVL III: CPT | Mod: PBBFAC,,, | Performed by: NURSE PRACTITIONER

## 2024-05-13 PROCEDURE — 3288F FALL RISK ASSESSMENT DOCD: CPT | Mod: CPTII,S$GLB,, | Performed by: NURSE PRACTITIONER

## 2024-05-13 PROCEDURE — 1159F MED LIST DOCD IN RCRD: CPT | Mod: CPTII,S$GLB,, | Performed by: NURSE PRACTITIONER

## 2024-05-13 PROCEDURE — 3008F BODY MASS INDEX DOCD: CPT | Mod: CPTII,S$GLB,, | Performed by: NURSE PRACTITIONER

## 2024-05-13 PROCEDURE — 1160F RVW MEDS BY RX/DR IN RCRD: CPT | Mod: CPTII,S$GLB,, | Performed by: NURSE PRACTITIONER

## 2024-05-13 PROCEDURE — 3079F DIAST BP 80-89 MM HG: CPT | Mod: CPTII,S$GLB,, | Performed by: NURSE PRACTITIONER

## 2024-05-13 PROCEDURE — 99213 OFFICE O/P EST LOW 20 MIN: CPT | Mod: S$GLB,,, | Performed by: NURSE PRACTITIONER

## 2024-05-13 PROCEDURE — 1125F AMNT PAIN NOTED PAIN PRSNT: CPT | Mod: CPTII,S$GLB,, | Performed by: NURSE PRACTITIONER

## 2024-05-13 PROCEDURE — 3077F SYST BP >= 140 MM HG: CPT | Mod: CPTII,S$GLB,, | Performed by: NURSE PRACTITIONER

## 2024-05-13 PROCEDURE — 1100F PTFALLS ASSESS-DOCD GE2>/YR: CPT | Mod: CPTII,S$GLB,, | Performed by: NURSE PRACTITIONER

## 2024-05-13 RX ORDER — CYCLOBENZAPRINE HCL 10 MG
10 TABLET ORAL 3 TIMES DAILY PRN
Qty: 90 TABLET | Refills: 0 | Status: SHIPPED | OUTPATIENT
Start: 2024-05-13 | End: 2024-06-12

## 2024-05-13 NOTE — PROGRESS NOTES
Chronic Pain - Established Visit    Referring Physician: No ref. provider found    Chief Complaint:   Chief Complaint   Patient presents with    Shoulder Pain        SUBJECTIVE:    Interval History 5/13/2024:  The patient returns today for follow up of left shoulder pain. She is s/p left suprascapular nerve block with 80% relief for 3 weeks. Her pain then returned at that time. It has been worsened since that time. She is again having limitation with movements and lifting her arm. Her pain today is 8/10.    Initial Encounter:  Kate Taylor presents to the clinic for the evaluation of left shoulder pain. The pain started about a year ago following a fall on the left arm and symptoms have been unchanged.The pain is located in the left shoulder and radiates to the trap, chest, and proximal arm.  The pain is described as aching and stabbing and is rated as 7/10. The pain is rated with a score of  5/10 on the BEST day and a score of 10/10 on the WORST day.  Symptoms interfere with daily activity and sleeping. The pain is exacerbated by any motion of the left arm.  The pain is mitigated by medications (only somewhat) and rest. Patient denies significant motor weakness and loss of sensations. Patient had seen orthopedics for surgical interventions but not interested in surgery ast this time and was referred to us for interventions     Ortho notes from last month   02/20/2024   Patient reports back in July of 2023 she had a fall that caused dislocation of her wrist as well as her left shoulder with reverse Hill-Sachs lesion.  She states she was seeing care at [facility].  She subsequent got an MRI that showed a partial-thickness tear of the supra and infraspinatus with calcific tendonitis.  She reports that she was doing therapy that helped improve her shoulder while she was doing therapy, but as soon as therapy ended her shoulder pain returned.  She is received multiple injections in the left shoulder with the last being in  in September 2023.  She reports she had very good relief from the injection for about 6 weeks. She states that her left shoulder pain is impacting her activities of daily living such as brushing her hair.  She denies any numbness or tingling.         Physical Therapy/Home Exercise:   yes      Pain Disability Index Review:      5/13/2024     1:12 PM   Last 3 PDI Scores   Pain Disability Index (PDI) 40       Pain Medications:  Tramadol 50mg daily prn (repeated 7d prescriptions from Marylou Gonsalez MD)     report:    Reviewed and consistent with medication use as prescribed.    Pain Procedures:   Shoulder injections by Ortho  4/8/24 Left suprascapular nerve injection- 80% relief for 3 weeks    Imaging:   CT ARTHROGRAM SHOULDER LEFT  FINDINGS:  Coracoacromial arch: The acromioclavicular joint is unremarkable.  Os acromiale with moderate degenerative change of the synchondrosis.  There is contrast and debris in the subacromial subdeltoid bursa.  Rotator cuff: Full-thickness tear involving supraspinatus anteriorly just proximal to the footprint attachment (201:117).  There is a high-grade partial-thickness articular surface tear extending into the junctional fibers with mild delamination.  No articular sided tears involving subscapularis or teres minor.  No tendon retraction or muscle atrophy.  Glenoid labrum: Labral degeneration without discrete tear.  Biceps tendon: The biceps tendon long head is grossly intact at the superior glenoid tubercle and is normally positioned in the biceps groove.  Bone: Remote fracture deformity of the proximal humerus.  No acute fracture, osteonecrosis, or focal lesion.  Joint: Moderate-sized region of partial-thickness cartilage loss and full-thickness fissuring over the glenoid, with subchondral cyst formation.  Blunting of the posterior glenoid with acquired retroversion.  Humeral cartilage is maintained.  There is contrast and debris in the glenohumeral joint.  Miscellaneous: Bulky  calcified mediastinal and left hilar lymph nodes, compatible with history of sarcoidosis.  Multiple solid pulmonary nodules measuring up to 0.8 cm, for example in the left upper lobe on 3:151), unchanged from 2011.  Impression:  Complex tear of supraspinatus with a full-thickness component anteriorly.  No tendon retraction or muscle atrophy.  Remote fracture of the proximal humerus.  Os acromiale with moderate degenerative changes of the synchondrosis.  Glenoid chondromalacia.  Glenohumeral synovitis and subacromial subdeltoid bursitis      Past Medical History:   Diagnosis Date    Asthma     Depression     Hypertension     Sarcoidosis of lung      Past Surgical History:   Procedure Laterality Date    APPENDECTOMY      COLONOSCOPY N/A 2/29/2024    Procedure: COLONOSCOPY;  Surgeon: Clementina Snyder MD;  Location: [facility];  Service: Colon and Rectal;  Laterality: N/A;    HYSTERECTOMY      DUB and cervical dysplasia.     SINUS SURGERY  5/19/2013    surgery X 3    TONSILLECTOMY       Social History     Socioeconomic History    Marital status:     Number of children: 3   Occupational History    Occupation: Registered Nurse     Employer: [employer]   Tobacco Use    Smoking status: Former     Current packs/day: 0.00     Types: Cigarettes     Quit date: 2/27/2014     Years since quitting: 10.2    Smokeless tobacco: Never    Tobacco comments:     Report quit last week   Substance and Sexual Activity    Alcohol use: Yes     Comment: social    Drug use: No     Social Determinants of Health     Food Insecurity: No Food Insecurity (10/10/2023)    [facility]    Hunger Vital Sign     Worried About Running Out of Food in the Last Year: Never true     Ran Out of Food in the Last Year: Never true   Transportation Needs: No Transportation Needs (10/10/2023)    [facility]    PRAPARE - Transportation     Lack of Transportation (Medical): No     Lack of  Transportation (Non-Medical): No   Housing Stability: Low Risk  (10/10/2023)    [facility]    Housing Stability Vital Sign     Unable to Pay for Housing in the Last Year: No     Number of Places Lived in the Last Year: 1     In the last 12 months, was there a time when you did not have a steady place to sleep or slept in a shelter (including now)?: No     Family History   Problem Relation Name Age of Onset    Hypertension Mother      Heart disease Mother      Stroke Mother      Hypertension Sister      Hypertension Brother      COPD Brother      Kidney disease Brother      Schizophrenia Brother      Diabetes Neg Hx      Cancer Neg Hx         Review of patient's allergies indicates:   Allergen Reactions    Lisinopril Swelling       Current Outpatient Medications   Medication Sig    alendronate (FOSAMAX) 70 MG tablet Take 70 mg by mouth every 7 days.    amLODIPine (NORVASC) 10 MG tablet Take 10 mg by mouth.    atenoloL (TENORMIN) 50 MG tablet Take by mouth.    estradioL (ESTRACE) 0.01 % (0.1 mg/gram) vaginal cream 0.5 grams with applicator or dime-sized amount with finger in vagina nightly x 2 weeks, then twice a week thereafter    hydroCHLOROthiazide (HYDRODIURIL) 25 MG tablet Take 25 mg by mouth.    HYDROcodone-acetaminophen (NORCO) 5-325 mg per tablet Take 1 tablet by mouth every 4 (four) hours as needed for Pain.    ibuprofen (ADVIL,MOTRIN) 800 MG tablet Take 1 tablet (800 mg total) by mouth every 6 (six) hours as needed for Pain.    ondansetron (ZOFRAN-ODT) 4 MG TbDL Take 1 tablet (4 mg total) by mouth every 6 (six) hours as needed.    vibegron (GEMTESA) 75 mg Tab Take 1 tablet (75 mg total) by mouth once daily.    VITAMIN D2 1,250 mcg (50,000 unit) capsule Take 50,000 Units by mouth every 7 days.    albuterol-ipratropium 2.5mg-0.5mg/3mL (DUO-NEB) 0.5 mg-3 mg(2.5 mg base)/3 mL nebulizer solution Take 3 mLs by nebulization every 4 (four) hours as needed for Wheezing.     "fluticasone-salmeterol 250-50 mcg/dose (ADVAIR) 250-50 mcg/dose diskus inhaler Inhale 1 puff into the lungs 2 (two) times daily.    gabapentin (NEURONTIN) 300 MG capsule Take 1 capsule (300 mg total) by mouth 3 (three) times daily.     No current facility-administered medications for this visit.       REVIEW OF SYSTEMS:  GENERAL:  No weight loss, malaise or fevers.  HEENT:  Negative for frequent or significant headaches.  NECK:  Negative for lumps, goiter, pain and significant neck swelling.  RESPIRATORY:  Negative for cough, wheezing or shortness of breath.  CARDIOVASCULAR:  Negative for chest pain, leg swelling or palpitations.  GI:  Negative for abdominal discomfort, blood in stools or black stools or change in bowel habits.  MUSCULOSKELETAL:  See HPI.  SKIN:  Negative for lesions, rash, and itching.  PSYCH:  +ve for sleep disturbance, mood disorder and recent psychosocial stressors.  HEMATOLOGY/LYMPHOLOGY:  Negative for prolonged bleeding, bruising easily or swollen nodes.  NEURO:   No history of headaches, syncope, paralysis, seizures or tremors.  All other reviewed and negative other than HPI.    OBJECTIVE:  BP (!) 156/86 (BP Location: Right arm, Patient Position: Sitting, BP Method: Large (Automatic))   Pulse 99   Ht 5' 2" (1.575 m)   Wt 59 kg (130 lb 1.1 oz)   BMI 23.79 kg/m²     PHYSICAL EXAMINATION  General appearance: Well appearing, in no acute distress, alert and oriented x3.  Psych:  Mood and affect appropriate.  Skin: Skin color, texture, turgor normal, no rashes or lesions, in both upper and lower body.  Head/face:  Atraumatic, normocephalic. No palpable lymph nodes  Back: Straight leg raising in the sitting and supine positions is negative to radicular pain.  Normal range of motion   Extremities: left shoulder TTP subacromial and anterior joint line. ROM left shoulder limited in abduction, flexion, extension by pain.   Musculoskeletal: Ship, sacroiliac and knee provocative maneuvers are " negative. Bilateral upper and lower extremity strength is normal and symmetric.  No atrophy or tone abnormalities are noted.  Neuro: Bilateral upper and lower extremity coordination and muscle stretch reflexes are physiologic and symmetric.  Plantar response are downgoing. No loss of sensation is noted.  Gait: Normal.    SPECIAL TESTS:  Sen positive on the left.  Empty Can positive on the left.  Painful arc positive    ASSESSMENT: 69 y.o. female with left shoulder pain, consistent with     1. Suprascapular nerve entrapment, left  Procedure Order to Pain Management      2. Left shoulder pain, unspecified chronicity        3. Tear of left rotator cuff, unspecified tear extent, unspecified whether traumatic          PLAN:   - I have stressed the importance of physical activity and a home exercise plan to help with pain and improve health.  - Patient can continue with medications for now since they are providing benefits, using them appropriately, and without side effects.  - Schedule left suprascapular nerve RFA. She had 80% relief for 3 weeks with block  - Counseled patient regarding the importance of activity modification and physical therapy.  -RTC 4 weeks after RFA.    The above plan and management options were discussed at length with patient. Patient is in agreement with the above and verbalized understanding.     Tenisha Wren, TODD  05/13/2024

## 2024-05-13 NOTE — TELEPHONE ENCOUNTER
----- Message from Selam Lowe sent at 5/10/2024  2:48 PM CDT -----  Name of Who is Calling:DINAH LEONG [[id number]]                   What is the request in detail:PT wants a call back to know if she is seeing the right Dr. Please assist                   Can the clinic reply by [facility]: No                   What Number to Call Back if not [facility]: [phone]

## 2024-05-13 NOTE — TELEPHONE ENCOUNTER
Staff spoke with pt and verified with her she is seeing the right provider and has a f/u appt scheduled today with Tenisha Wren.

## 2024-05-14 ENCOUNTER — TELEPHONE (OUTPATIENT)
Dept: UROGYNECOLOGY | Facility: CLINIC | Age: 69
End: 2024-05-14
Payer: MEDICARE

## 2024-05-14 NOTE — TELEPHONE ENCOUNTER
Spoke with patient.     She is recovering from PNA. Needs some time to recoup from this. States asthma is otherwise controlled.     She has not scheduled PT yet to work on reducing straining with BMs--encouraged her to call/schedule that.     She saw ortho regarding L rotator cuff tear.  They did injection. Feels better. Said she can move forward with our surgery. Can repeat injection closer to our surgery if needed.     Message sent to [facility] and [facility] staff to look for OR dates.

## 2024-05-15 DIAGNOSIS — K62.3 RECTAL PROLAPSE: ICD-10-CM

## 2024-05-15 DIAGNOSIS — J45.21 MILD INTERMITTENT ASTHMA WITH ACUTE EXACERBATION: ICD-10-CM

## 2024-05-15 DIAGNOSIS — N99.3 VAGINAL VAULT PROLAPSE, POSTHYSTERECTOMY: ICD-10-CM

## 2024-05-15 DIAGNOSIS — N39.46 URINARY INCONTINENCE, MIXED: Primary | ICD-10-CM

## 2024-05-15 DIAGNOSIS — Z72.0 TOBACCO ABUSE: ICD-10-CM

## 2024-05-19 ENCOUNTER — TELEPHONE (OUTPATIENT)
Dept: SURGERY | Facility: CLINIC | Age: 69
End: 2024-05-19
Payer: MEDICARE

## 2024-05-19 NOTE — PROGRESS NOTES
Spoke with pt regarding appt on 5/20 with Dr. Snyder. Informed that provider is ill and appt s are being rescheduled. Offered pt to be seen on 5/24 at 2 PM in the [facility]. Pt verbally confirmed time and location.

## 2024-05-22 ENCOUNTER — TELEPHONE (OUTPATIENT)
Dept: ORTHOPEDICS | Facility: CLINIC | Age: 69
End: 2024-05-22
Payer: MEDICARE

## 2024-05-22 ENCOUNTER — OFFICE VISIT (OUTPATIENT)
Dept: ORTHOPEDICS | Facility: CLINIC | Age: 69
End: 2024-05-22
Payer: MEDICARE

## 2024-05-22 VITALS — HEIGHT: 62 IN | BODY MASS INDEX: 23.93 KG/M2 | WEIGHT: 130.06 LBS

## 2024-05-22 DIAGNOSIS — M75.102 TEAR OF LEFT ROTATOR CUFF, UNSPECIFIED TEAR EXTENT, UNSPECIFIED WHETHER TRAUMATIC: Primary | ICD-10-CM

## 2024-05-22 PROCEDURE — 20610 DRAIN/INJ JOINT/BURSA W/O US: CPT | Mod: LT,S$GLB,, | Performed by: SPECIALIST/TECHNOLOGIST

## 2024-05-22 PROCEDURE — 3288F FALL RISK ASSESSMENT DOCD: CPT | Mod: CPTII,S$GLB,, | Performed by: SPECIALIST/TECHNOLOGIST

## 2024-05-22 PROCEDURE — 3008F BODY MASS INDEX DOCD: CPT | Mod: CPTII,S$GLB,, | Performed by: SPECIALIST/TECHNOLOGIST

## 2024-05-22 PROCEDURE — 1159F MED LIST DOCD IN RCRD: CPT | Mod: CPTII,S$GLB,, | Performed by: SPECIALIST/TECHNOLOGIST

## 2024-05-22 PROCEDURE — 99999 PR PBB SHADOW E&M-EST. PATIENT-LVL III: CPT | Mod: PBBFAC,,, | Performed by: SPECIALIST/TECHNOLOGIST

## 2024-05-22 PROCEDURE — 1101F PT FALLS ASSESS-DOCD LE1/YR: CPT | Mod: CPTII,S$GLB,, | Performed by: SPECIALIST/TECHNOLOGIST

## 2024-05-22 PROCEDURE — 99214 OFFICE O/P EST MOD 30 MIN: CPT | Mod: 25,S$GLB,, | Performed by: SPECIALIST/TECHNOLOGIST

## 2024-05-22 PROCEDURE — 1125F AMNT PAIN NOTED PAIN PRSNT: CPT | Mod: CPTII,S$GLB,, | Performed by: SPECIALIST/TECHNOLOGIST

## 2024-05-22 RX ORDER — TRIAMCINOLONE ACETONIDE 40 MG/ML
80 INJECTION, SUSPENSION INTRA-ARTICULAR; INTRAMUSCULAR
Status: DISCONTINUED | OUTPATIENT
Start: 2024-05-22 | End: 2024-05-22 | Stop reason: HOSPADM

## 2024-05-22 RX ADMIN — TRIAMCINOLONE ACETONIDE 80 MG: 40 INJECTION, SUSPENSION INTRA-ARTICULAR; INTRAMUSCULAR at 10:05

## 2024-05-22 NOTE — PROCEDURES
Large Joint Aspiration/Injection: L subacromial bursa    Date/Time: 5/22/2024 10:00 AM    Performed by: Matt Graves PA-C  Authorized by: Matt Graves PA-C    Consent Done?:  Yes (Verbal)  Indications:  Pain  Site marked: the procedure site was marked    Timeout: prior to procedure the correct patient, procedure, and site was verified    Prep: patient was prepped and draped in usual sterile fashion      Local anesthesia used?: Yes    Local anesthetic:  Lidocaine 1% without epinephrine  Anesthetic total (ml):  8      Details:  Needle Size:  22 G  Approach:  Posterior  Location:  Shoulder  Site:  L subacromial bursa  Medications:  80 mg triamcinolone acetonide 40 mg/mL  Patient tolerance:  Patient tolerated the procedure well with no immediate complications

## 2024-05-22 NOTE — TELEPHONE ENCOUNTER
Spoke c pt. Confirmed appt location & time c Franklyn Graves PA-C 05/22/24 for injections of her left shoulder. Pt expressed understanding & was thankful.

## 2024-05-23 ENCOUNTER — TELEPHONE (OUTPATIENT)
Dept: SURGERY | Facility: CLINIC | Age: 69
End: 2024-05-23
Payer: MEDICARE

## 2024-05-23 NOTE — TELEPHONE ENCOUNTER
Spoke with pt regarding 2 PM appt with Dr. Snyder on 5/24. Pt verbally confirmed time and location. Reminded that appt will be in [place] [facility]. No further questions at this time.

## 2024-05-24 ENCOUNTER — TELEPHONE (OUTPATIENT)
Dept: SURGERY | Facility: CLINIC | Age: 69
End: 2024-05-24
Payer: MEDICARE

## 2024-05-24 NOTE — TELEPHONE ENCOUNTER
"Spoke with pt regarding appt scheduled today with Dr. Snyder. Pt states, "she was unable to attend appt d/t transportation issues." Offered to be seen at next available at [facility] location per pt request on 6/28 for 0920. Advised to reach out to Pre-admit to schedule appt on same day. Pt is scheduled for surgery on 7/11. Pt denies further questions or concerns at this time.       ----- Message from Heron Llanos sent at 5/24/2024 12:19 PM CDT -----  Regarding: Reschedule appointment  Contact: [phone]  Calling to reschedule appointment due to transportation. Please contact patient as soon as possible.  "

## 2024-05-28 NOTE — PROGRESS NOTES
"  Subjective:       Kate Taylor is a 69 y.o. female who is a new patient who was seen  for evaluation of bladder issues.  Arrived 40 minutes late for visit and was accommodated to be seen.     Also seeing urogyn for EDYTA and vagina/rectal prolapse planning for surgery - last seen 5/7/24. Also coordinating with CRS for rectal prolapse - seeing Dr Snyder and Dr Rausch. States she is here for me to evaluate further urologic needs.     She was given Gemtesa for OAB/UUI - she has not yet gotten this medication. Also has not yet gotten Estrace cream.      PVR (bladder scan) today - 27cc      The following portions of the patient's history were reviewed and updated as appropriate: allergies, current medications, past family history, past medical history, past social history, past surgical history and problem list.    Review of Systems  12 point review of systems completed. Pertinent positive and negatives listed in HPI        Objective:    Vitals: BP (!) 145/87 (BP Location: Right arm, Patient Position: Sitting, BP Method: Medium (Automatic))   Pulse 88   Resp 12   Ht 5' 2" (1.575 m)   Wt 59 kg (130 lb 1.1 oz)   SpO2 100%   BMI 23.79 kg/m²     Physical Exam   General: well developed, well nourished in no acute distress  Head: normocephalic, atraumatic  Neck: no obvious enlargement of thyroid  HEENT: EOMI, mucus membranes moist, sclera anicteric, no hearing impairment  Lungs: symmetric expansion, non-labored breathing  Neuro: alert and oriented x 3, no gross deficits  Psych: normal judgment and insight, normal mood/affect and non-anxious  Genitourinary: deferred      Lab Review   Urine analysis today in clinic shows positive for protein trace     Lab Results   Component Value Date    WBC 6.8 10/10/2023    WBC 10.82 07/21/2015    HGB 12.1 10/10/2023    HGB 14.4 07/21/2015    HCT 35.1 10/10/2023    HCT 42.9 07/21/2015    MCV 90.4 10/10/2023    MCV 90 07/21/2015     07/21/2015     Lab Results   Component Value " Date    CREATININE 0.9 05/01/2024    BUN 18.0 02/17/2023    BUN 13 07/21/2015       Imaging  NA       Assessment/Plan:      1. OAB (overactive bladder)    - Awaiting Gemtesa rx   - f/u uogyn     2. Urge incontinence    - SUDS scheduled this month     3. Vaginal prolapse    - Cont f/u with Dr Rausch. Planning surgery.     4. Rectal prolapse    - Cont f/u with Dr Snyder. Planning surgery.         Follow up in PR[facility]

## 2024-06-05 ENCOUNTER — OFFICE VISIT (OUTPATIENT)
Dept: UROLOGY | Facility: CLINIC | Age: 69
End: 2024-06-05
Attending: UROLOGY
Payer: MEDICARE

## 2024-06-05 VITALS
OXYGEN SATURATION: 100 % | RESPIRATION RATE: 12 BRPM | BODY MASS INDEX: 23.93 KG/M2 | HEIGHT: 62 IN | SYSTOLIC BLOOD PRESSURE: 145 MMHG | WEIGHT: 130.06 LBS | HEART RATE: 88 BPM | DIASTOLIC BLOOD PRESSURE: 87 MMHG

## 2024-06-05 DIAGNOSIS — N81.10 VAGINAL PROLAPSE: ICD-10-CM

## 2024-06-05 DIAGNOSIS — N32.81 OAB (OVERACTIVE BLADDER): Primary | ICD-10-CM

## 2024-06-05 DIAGNOSIS — K62.3 RECTAL PROLAPSE: ICD-10-CM

## 2024-06-05 DIAGNOSIS — N39.41 URGE INCONTINENCE: ICD-10-CM

## 2024-06-05 PROCEDURE — 1159F MED LIST DOCD IN RCRD: CPT | Mod: CPTII,S$GLB,, | Performed by: UROLOGY

## 2024-06-05 PROCEDURE — 99203 OFFICE O/P NEW LOW 30 MIN: CPT | Mod: S$GLB,,, | Performed by: UROLOGY

## 2024-06-05 PROCEDURE — 1101F PT FALLS ASSESS-DOCD LE1/YR: CPT | Mod: CPTII,S$GLB,, | Performed by: UROLOGY

## 2024-06-05 PROCEDURE — 3077F SYST BP >= 140 MM HG: CPT | Mod: CPTII,S$GLB,, | Performed by: UROLOGY

## 2024-06-05 PROCEDURE — 1125F AMNT PAIN NOTED PAIN PRSNT: CPT | Mod: CPTII,S$GLB,, | Performed by: UROLOGY

## 2024-06-05 PROCEDURE — 1160F RVW MEDS BY RX/DR IN RCRD: CPT | Mod: CPTII,S$GLB,, | Performed by: UROLOGY

## 2024-06-05 PROCEDURE — 3008F BODY MASS INDEX DOCD: CPT | Mod: CPTII,S$GLB,, | Performed by: UROLOGY

## 2024-06-05 PROCEDURE — 3288F FALL RISK ASSESSMENT DOCD: CPT | Mod: CPTII,S$GLB,, | Performed by: UROLOGY

## 2024-06-05 PROCEDURE — 3079F DIAST BP 80-89 MM HG: CPT | Mod: CPTII,S$GLB,, | Performed by: UROLOGY

## 2024-06-07 ENCOUNTER — PATIENT MESSAGE (OUTPATIENT)
Dept: PAIN MEDICINE | Facility: OTHER | Age: 69
End: 2024-06-07
Payer: MEDICARE

## 2024-06-11 ENCOUNTER — HOSPITAL ENCOUNTER (EMERGENCY)
Facility: OTHER | Age: 69
Discharge: HOME | End: 2024-06-11
Attending: EMERGENCY MEDICINE
Payer: MEDICARE

## 2024-06-11 VITALS
SYSTOLIC BLOOD PRESSURE: 141 MMHG | TEMPERATURE: 98 F | OXYGEN SATURATION: 99 % | DIASTOLIC BLOOD PRESSURE: 95 MMHG | HEIGHT: 62 IN | WEIGHT: 130 LBS | HEART RATE: 99 BPM | BODY MASS INDEX: 23.92 KG/M2 | RESPIRATION RATE: 20 BRPM

## 2024-06-11 DIAGNOSIS — R06.02 SHORTNESS OF BREATH: ICD-10-CM

## 2024-06-11 DIAGNOSIS — R60.9 EDEMA, UNSPECIFIED TYPE: Primary | ICD-10-CM

## 2024-06-11 LAB
ALBUMIN SERPL BCP-MCNC: 4 G/DL (ref 3.5–5.2)
ALP SERPL-CCNC: 63 U/L (ref 55–135)
ALT SERPL W/O P-5'-P-CCNC: 23 U/L (ref 10–44)
ANION GAP SERPL CALC-SCNC: 5 MMOL/L (ref 8–16)
AST SERPL-CCNC: 14 U/L (ref 10–40)
BASOPHILS # BLD AUTO: 0.01 K/UL (ref 0–0.2)
BASOPHILS NFR BLD: 0.2 % (ref 0–1.9)
BILIRUB SERPL-MCNC: 0.3 MG/DL (ref 0.1–1)
BILIRUB UR QL STRIP: NEGATIVE
BNP SERPL-MCNC: 28 PG/ML (ref 0–99)
BUN SERPL-MCNC: 25 MG/DL (ref 8–23)
CALCIUM SERPL-MCNC: 9.9 MG/DL (ref 8.7–10.5)
CHLORIDE SERPL-SCNC: 104 MMOL/L (ref 95–110)
CLARITY UR: CLEAR
CO2 SERPL-SCNC: 28 MMOL/L (ref 23–29)
COLOR UR: COLORLESS
CREAT SERPL-MCNC: 0.8 MG/DL (ref 0.5–1.4)
DIFFERENTIAL METHOD BLD: ABNORMAL
EOSINOPHIL # BLD AUTO: 0 K/UL (ref 0–0.5)
EOSINOPHIL NFR BLD: 0 % (ref 0–8)
ERYTHROCYTE [DISTWIDTH] IN BLOOD BY AUTOMATED COUNT: 13 % (ref 11.5–14.5)
EST. GFR  (NO RACE VARIABLE): >60 ML/MIN/1.73 M^2
GLUCOSE SERPL-MCNC: 98 MG/DL (ref 70–110)
GLUCOSE UR QL STRIP: NEGATIVE
HCT VFR BLD AUTO: 39.4 % (ref 37–48.5)
HGB BLD-MCNC: 12.8 G/DL (ref 12–16)
HGB UR QL STRIP: NEGATIVE
IMM GRANULOCYTES # BLD AUTO: 0.06 K/UL (ref 0–0.04)
IMM GRANULOCYTES NFR BLD AUTO: 1.1 % (ref 0–0.5)
KETONES UR QL STRIP: NEGATIVE
LEUKOCYTE ESTERASE UR QL STRIP: NEGATIVE
LYMPHOCYTES # BLD AUTO: 0.7 K/UL (ref 1–4.8)
LYMPHOCYTES NFR BLD: 11.8 % (ref 18–48)
MCH RBC QN AUTO: 28.8 PG (ref 27–31)
MCHC RBC AUTO-ENTMCNC: 32.5 G/DL (ref 32–36)
MCV RBC AUTO: 89 FL (ref 82–98)
MONOCYTES # BLD AUTO: 0.3 K/UL (ref 0.3–1)
MONOCYTES NFR BLD: 6.1 % (ref 4–15)
NEUTROPHILS # BLD AUTO: 4.5 K/UL (ref 1.8–7.7)
NEUTROPHILS NFR BLD: 80.8 % (ref 38–73)
NITRITE UR QL STRIP: NEGATIVE
NRBC BLD-RTO: 0 /100 WBC
OHS QRS DURATION: 86 MS
OHS QTC CALCULATION: 445 MS
PH UR STRIP: 7 [PH] (ref 5–8)
PLATELET # BLD AUTO: 273 K/UL (ref 150–450)
PMV BLD AUTO: 9.2 FL (ref 9.2–12.9)
POTASSIUM SERPL-SCNC: 3.8 MMOL/L (ref 3.5–5.1)
PROT SERPL-MCNC: 6.9 G/DL (ref 6–8.4)
PROT UR QL STRIP: NEGATIVE
RBC # BLD AUTO: 4.44 M/UL (ref 4–5.4)
SODIUM SERPL-SCNC: 137 MMOL/L (ref 136–145)
SP GR UR STRIP: 1.01 (ref 1–1.03)
TROPONIN I SERPL DL<=0.01 NG/ML-MCNC: <0.006 NG/ML (ref 0–0.03)
URN SPEC COLLECT METH UR: ABNORMAL
UROBILINOGEN UR STRIP-ACNC: NEGATIVE EU/DL
WBC # BLD AUTO: 5.61 K/UL (ref 3.9–12.7)

## 2024-06-11 PROCEDURE — 83880 ASSAY OF NATRIURETIC PEPTIDE: CPT | Performed by: NURSE PRACTITIONER

## 2024-06-11 PROCEDURE — 81003 URINALYSIS AUTO W/O SCOPE: CPT | Performed by: NURSE PRACTITIONER

## 2024-06-11 PROCEDURE — 93005 ELECTROCARDIOGRAM TRACING: CPT

## 2024-06-11 PROCEDURE — 84484 ASSAY OF TROPONIN QUANT: CPT | Performed by: NURSE PRACTITIONER

## 2024-06-11 PROCEDURE — 93010 ELECTROCARDIOGRAM REPORT: CPT | Mod: ,,, | Performed by: INTERNAL MEDICINE

## 2024-06-11 PROCEDURE — 63600175 PHARM REV CODE 636 W HCPCS: Performed by: EMERGENCY MEDICINE

## 2024-06-11 PROCEDURE — 85025 COMPLETE CBC W/AUTO DIFF WBC: CPT | Performed by: NURSE PRACTITIONER

## 2024-06-11 PROCEDURE — 80053 COMPREHEN METABOLIC PANEL: CPT | Performed by: NURSE PRACTITIONER

## 2024-06-11 PROCEDURE — 96374 THER/PROPH/DIAG INJ IV PUSH: CPT

## 2024-06-11 PROCEDURE — 99285 EMERGENCY DEPT VISIT HI MDM: CPT | Mod: 25

## 2024-06-11 RX ORDER — FUROSEMIDE 20 MG/1
20 TABLET ORAL DAILY PRN
Qty: 14 TABLET | Refills: 0 | Status: SHIPPED | OUTPATIENT
Start: 2024-06-11 | End: 2024-06-25

## 2024-06-11 RX ORDER — FUROSEMIDE 10 MG/ML
40 INJECTION INTRAMUSCULAR; INTRAVENOUS
Status: COMPLETED | OUTPATIENT
Start: 2024-06-11 | End: 2024-06-11

## 2024-06-11 RX ADMIN — FUROSEMIDE 40 MG: 10 INJECTION, SOLUTION INTRAMUSCULAR; INTRAVENOUS at 01:06

## 2024-06-11 NOTE — FIRST PROVIDER EVALUATION
Emergency Department TeleTriage Encounter Note      CHIEF COMPLAINT    Chief Complaint   Patient presents with    Leg Swelling     BL lower leg and feet swelling onset Saturday. Reports sob, denies cp. Denies hx of chf. Denies diuretic use.        VITAL SIGNS   Initial Vitals [06/11/24 1122]   BP Pulse Resp Temp SpO2   (!) 169/82 104 (!) 22 98.1 °F (36.7 °C) 98 %      MAP       --            ALLERGIES    Review of patient's allergies indicates:   Allergen Reactions    Lisinopril Swelling       PROVIDER TRIAGE NOTE  This is a teletriage evaluation of a 69 y.o. female presenting to the ED complaining of SOB. Reports BLE edema since Saturday. Denies PMhx of CHF. Reports decreased UOP.     Alert, speaking in complete sentences .     Initial orders will be placed and care will be transferred to an alternate provider when patient is roomed for a full evaluation. Any additional orders and the final disposition will be determined by that provider.         ORDERS  Labs Reviewed   CBC W/ AUTO DIFFERENTIAL   COMPREHENSIVE METABOLIC PANEL   TROPONIN I   B-TYPE NATRIURETIC PEPTIDE   URINALYSIS, REFLEX TO URINE CULTURE       ED Orders (720h ago, onward)      Start Ordered     Status Ordering Provider    06/11/24 1200 06/11/24 1134  Strict intake and output Monitor and record urine output (in I's & O's section) every hour after initial furosemide injection given in ED  Every hour        Comments: Monitor and record urine output (in I's & O's section) every hour after initial furosemide injection given in ED    Ordered JESSICA ALARCON N.    06/11/24 1135 06/11/24 1134  Confirm Patient is not Eligible for Congestive Heart Failure Pathway  Until discontinued         Ordered JESSICA ALARCON N.    06/11/24 1135 06/11/24 1134  Vital signs  Every 30 min         Ordered JESSICA ALARCON N.    06/11/24 1135 06/11/24 1134  Cardiac Monitoring - Adult  Continuous        Comments: Notify Physician If:    Ordered  COLLEENJEANNE JESSICA N.    06/11/24 1135 06/11/24 1134  Pulse Oximetry Continuous  Continuous         Ordered MARYLOURUBY JESSICA N.    06/11/24 1135 06/11/24 1134  Insert peripheral IV  Once         Ordered YAYAARAVIND JESSICA N.    06/11/24 1135 06/11/24 1134  CBC auto differential  STAT         Ordered COLLEENJEANNE JESSICA N.    06/11/24 1135 06/11/24 1134  Comprehensive metabolic panel  STAT         Ordered MARYLOURUBY JESSICA N.    06/11/24 1135 06/11/24 1134  Troponin I  STAT         Ordered COLLEENJEANNE JESSICA N.    06/11/24 1135 06/11/24 1134  Brain natriuretic peptide  STAT         Ordered YAYAARAVIND JESSICA N.    06/11/24 1135 06/11/24 1134  EKG 12-lead  Once         Ordered COLLEENJEANNE JESSICA N.    06/11/24 1135 06/11/24 1134  X-Ray Chest AP Portable  1 time imaging         Ordered YAYAARAVIND JESSICA N.    06/11/24 1135 06/11/24 1134  Urinalysis, Reflex to Urine Culture Urine, Clean Catch  STAT         Ordered JESSICA ALARCON              Virtual Visit Note: The provider triage portion of this emergency department evaluation and documentation was performed via [employer], a HIPAA-compliant telemedicine application, in concert with a tele-presenter in the room. A face to face patient evaluation with one of my colleagues will occur once the patient is placed in an emergency department room.      DISCLAIMER: This note was prepared with bulletn. voice recognition transcription software. Garbled syntax, mangled pronouns, and other bizarre constructions may be attributed to that software system.

## 2024-06-11 NOTE — ED PROVIDER NOTES
Encounter Date: 6/11/2024       History     Chief Complaint   Patient presents with    Leg Swelling     BL lower leg and feet swelling onset Saturday. Reports sob, denies cp. Denies hx of chf. Denies diuretic use.      69-year-old female with a past medical history significant for hypertension as well as sarcoidosis in remission presenting for evaluation of swelling in the bilateral lower extremities.  She has historically suffered with episodic swelling of the lower extremities which is generally responsive to diuretic therapy however despite having received a treatment with diuretic in the last couple of days continued to have worsening swelling in the lower extremities.  She denies any fevers, chills, injuries elsewhere.  Does have an occasional cough      Review of patient's allergies indicates:   Allergen Reactions    Lisinopril Swelling     Past Medical History:   Diagnosis Date    Asthma     Depression     Hypertension     Sarcoidosis of lung      Past Surgical History:   Procedure Laterality Date    APPENDECTOMY      COLONOSCOPY N/A 2/29/2024    Procedure: COLONOSCOPY;  Surgeon: Clementina Snyedr MD;  Location: [facility];  Service: Colon and Rectal;  Laterality: N/A;    HYSTERECTOMY      DUB and cervical dysplasia.     SINUS SURGERY  5/19/2013    surgery X 3    TONSILLECTOMY       Family History   Problem Relation Name Age of Onset    Hypertension Mother      Heart disease Mother      Stroke Mother      Hypertension Sister      Hypertension Brother      COPD Brother      Kidney disease Brother      Schizophrenia Brother      Diabetes Neg Hx      Cancer Neg Hx       Social History     Tobacco Use    Smoking status: Former     Current packs/day: 0.00     Types: Cigarettes     Quit date: 2/27/2014     Years since quitting: 10.2    Smokeless tobacco: Never    Tobacco comments:     Report quit last week   Substance Use Topics    Alcohol use: Yes     Comment: social    Drug use: No     Review of  Systems  Constitutional-no fever  HEENT-no congestion  Eyes-no redness  Respiratory-no shortness of breath  Cardio-no chest pain  GI-no abdominal pain  Endocrine-no cold intolerance  -no difficulty urinating  MSK-no myalgias  Skin-no rashes  Allergy-no environmental allergy  Neurologic-, no headache  Hematology-no swollen nodes  Behavioral-no confusion  Physical Exam     Initial Vitals [06/11/24 1122]   BP Pulse Resp Temp SpO2   (!) 169/82 104 (!) 22 98.1 °F (36.7 °C) 98 %      MAP       --         Physical Exam  Constitutional: Well appearing, no distress.  Eyes: Conjunctivae normal.  ENT       Head: Normocephalic, atraumatic.       Nose: Normal external appearance        Mouth/Throat: no strigulous respirations   Hematological/Lymphatic/Immunilogical: no visible lymphadenopathy   Cardiovascular: Normal rate,   Respiratory: Normal respiratory effort.   Gastrointestinal: non distended   Musculoskeletal: Normal range of motion in all extremities.  Plus two edema in the bilateral lower extremities from the knees inferiorly  Neurologic: Alert, oriented. Normal speech and language. No gross focal neurologic deficits are appreciated.  Skin: Skin is warm, dry. No rash noted.  Psychiatric: Mood and affect are normal.   ED Course   Procedures  Labs Reviewed   CBC W/ AUTO DIFFERENTIAL - Abnormal; Notable for the following components:       Result Value    Immature Granulocytes 1.1 (*)     Immature Grans (Abs) 0.06 (*)     Lymph # 0.7 (*)     Gran % 80.8 (*)     Lymph % 11.8 (*)     All other components within normal limits   COMPREHENSIVE METABOLIC PANEL - Abnormal; Notable for the following components:    BUN 25 (*)     Anion Gap 5 (*)     All other components within normal limits   URINALYSIS, REFLEX TO URINE CULTURE - Abnormal; Notable for the following components:    Color, UA Colorless (*)     All other components within normal limits    Narrative:     Specimen Source->Urine   TROPONIN I   B-TYPE NATRIURETIC PEPTIDE         ECG Results              EKG 12-lead (Final result)        Collection Time Result Time QRS Duration OHS QTC Calculation    06/11/24 12:02:55 06/11/24 22:12:26 86 445                     Final result by Interface, Lab In [facility] (06/11/24 22:12:34)                   Narrative:    Test Reason : R06.02,    Vent. Rate : 093 BPM     Atrial Rate : 093 BPM     P-R Int : 160 ms          QRS Dur : 086 ms      QT Int : 358 ms       P-R-T Axes : 073 014 052 degrees     QTc Int : 445 ms    Normal sinus rhythm  Possible Anterior infarct ,age undetermined  Abnormal ECG    Confirmed by Deepa YUN, Cesar DOMINGUEZ ([id number]) on 6/11/2024 10:12:25 PM    Referred By: [email or web address]   SELF           Confirmed By:Cesar Arenas MD                      Wet Read by Bam Kwon MD (06/11/24 13:05:09, [facility] Dept, Emergency Medicine)    My EKG interpretation, sinus rhythm, 93 beats per minute, normal axis, no ST segment changes, poor R-wave progression, when compared to previous EKG 07/21/2015 relatively unchanged                                  Imaging Results              X-Ray Chest 1 View (Final result)  Result time 06/11/24 11:49:30   Procedure changed from X-Ray Chest AP Portable     Final result by Irvin Cooper III, MD (06/11/24 11:49:30)                   Impression:      No acute process seen.    Prior granulomatous disease.      Electronically signed by: Irvin Cooper MD  Date:    06/11/2024  Time:    11:49               Narrative:    EXAMINATION:  XR CHEST 1 VIEW    CLINICAL HISTORY:  CHF;    FINDINGS:  Chest one view:    Heart size is normal.  There are calcified mediastinal hilar lymph nodes.  Lungs are clear.  The bones showed DJD.                                    X-Rays:   Independently Interpreted Readings:   Other Readings:  Chest x-ray-mild increase in interstitial markings in the right lower lung field otherwise normal x-ray    Medications   furosemide injection 40 mg (40 mg Intravenous Given  6/11/24 1346)     Medical Decision Making  Differential diagnosis-heart failure, pulmonary edema,dependent edema, hypoproteinemia     70 yo woman with edema.  Bnp normal.  Labs unrevealing otherwise.   Will administer diuretic .  Plan for dc with follow up and diuretic use for follow up as needed.     Problems Addressed:  Edema, unspecified type: acute illness or injury  Shortness of breath: acute illness or injury    Amount and/or Complexity of Data Reviewed  External Data Reviewed: labs and notes.     Details: Hx of edema and sarcoidosis   Labs: ordered. Decision-making details documented in ED Course.  Radiology: ordered and independent interpretation performed. Decision-making details documented in ED Course.  ECG/medicine tests: ordered and independent interpretation performed. Decision-making details documented in ED Course.    Risk  OTC drugs.  Prescription drug management.  Drug therapy requiring intensive monitoring for toxicity.                                      Clinical Impression:  Final diagnoses:  [R06.02] Shortness of breath  [R60.9] Edema, unspecified type (Primary)          ED Disposition Condition    Discharge Stable          ED Prescriptions       Medication Sig Dispense Start Date End Date Auth. Provider    furosemide (LASIX) 20 MG tablet Take 1 tablet (20 mg total) by mouth daily as needed (leg swelling). 14 tablet 6/11/2024 6/25/2024 Bam Kwon MD    compress.stocking,knee,reg,med Misc 2 each by Misc.(Non-Drug; Combo Route) route once daily. 2 each 6/11/2024 -- Bam Kwon MD          Follow-up Information       Follow up With Specialties Details Why Contact Info    Marylou Gonsalez MD Family Medicine Call in 2 days If symptoms worsen, For a follow up visit about today [address] [address]  [phone]               Bam Kwon MD  06/12/24 [id number]

## 2024-06-11 NOTE — ED TRIAGE NOTES
BLE edema x 1 week. States seen by PCP last week and given IM Lasix which helped temporarily but states swelling is now worse, approaching the level of her knees. Denies home diuretics and states she does not monitor sodium intake. No recent change in medications or h/o kidney disease. Denies CP. Reports mild SOB, believed to be d/t asthma, relieved with inhalers. Presents awake, alert.

## 2024-06-11 NOTE — DISCHARGE INSTRUCTIONS
Mrs. Taylor,    Thank you for letting me care for you today! It was nice meeting you, and I hope you feel better soon.   If you would like access to your chart and what was done today please utilize the [facility].   Please come back to [facility] for all of your future medical needs.    Our goal in the emergency department is to always give you outstanding care and exceptional service. You may receive a survey by mail or e-mail in the next week regarding your experience in our ED. We would greatly appreciate you completing and returning the survey. Your feedback provides us with a way to recognize our staff who give very good care and it helps us learn how to improve when your experience was below our aspiration of excellence.     Sincerely,    Bam Kwon MD  Board Certified Emergency Physician

## 2024-06-12 DIAGNOSIS — G56.82 SUPRASCAPULAR NERVE ENTRAPMENT, LEFT: Primary | ICD-10-CM

## 2024-06-19 ENCOUNTER — HOSPITAL ENCOUNTER (OUTPATIENT)
Facility: OTHER | Age: 69
Discharge: HOME | End: 2024-06-19
Attending: ANESTHESIOLOGY | Admitting: ANESTHESIOLOGY
Payer: MEDICARE

## 2024-06-19 VITALS
RESPIRATION RATE: 18 BRPM | DIASTOLIC BLOOD PRESSURE: 87 MMHG | TEMPERATURE: 98 F | HEIGHT: 62 IN | OXYGEN SATURATION: 9 % | WEIGHT: 130 LBS | BODY MASS INDEX: 23.92 KG/M2 | HEART RATE: 102 BPM | SYSTOLIC BLOOD PRESSURE: 168 MMHG

## 2024-06-19 DIAGNOSIS — G89.29 CHRONIC LEFT SHOULDER PAIN: Primary | ICD-10-CM

## 2024-06-19 DIAGNOSIS — M25.512 CHRONIC LEFT SHOULDER PAIN: Primary | ICD-10-CM

## 2024-06-19 DIAGNOSIS — G89.29 CHRONIC PAIN: ICD-10-CM

## 2024-06-19 PROCEDURE — 25000003 PHARM REV CODE 250: Performed by: STUDENT IN AN ORGANIZED HEALTH CARE EDUCATION/TRAINING PROGRAM

## 2024-06-19 PROCEDURE — 76942 ECHO GUIDE FOR BIOPSY: CPT | Performed by: ANESTHESIOLOGY

## 2024-06-19 PROCEDURE — 64640 INJECTION TREATMENT OF NERVE: CPT | Mod: LT,,, | Performed by: ANESTHESIOLOGY

## 2024-06-19 PROCEDURE — 64640 INJECTION TREATMENT OF NERVE: CPT | Mod: LT | Performed by: ANESTHESIOLOGY

## 2024-06-19 PROCEDURE — 25000003 PHARM REV CODE 250: Performed by: ANESTHESIOLOGY

## 2024-06-19 PROCEDURE — 63600175 PHARM REV CODE 636 W HCPCS: Performed by: ANESTHESIOLOGY

## 2024-06-19 PROCEDURE — 99152 MOD SED SAME PHYS/QHP 5/>YRS: CPT | Performed by: ANESTHESIOLOGY

## 2024-06-19 RX ORDER — MIDAZOLAM HYDROCHLORIDE 1 MG/ML
INJECTION INTRAMUSCULAR; INTRAVENOUS
Status: DISCONTINUED | OUTPATIENT
Start: 2024-06-19 | End: 2024-06-19 | Stop reason: HOSPADM

## 2024-06-19 RX ORDER — BUPIVACAINE HYDROCHLORIDE 2.5 MG/ML
INJECTION, SOLUTION EPIDURAL; INFILTRATION; INTRACAUDAL
Status: DISCONTINUED | OUTPATIENT
Start: 2024-06-19 | End: 2024-06-19 | Stop reason: HOSPADM

## 2024-06-19 RX ORDER — LIDOCAINE HYDROCHLORIDE 20 MG/ML
INJECTION, SOLUTION INFILTRATION; PERINEURAL
Status: DISCONTINUED | OUTPATIENT
Start: 2024-06-19 | End: 2024-06-19 | Stop reason: HOSPADM

## 2024-06-19 RX ORDER — SODIUM CHLORIDE 9 MG/ML
INJECTION, SOLUTION INTRAVENOUS CONTINUOUS
Status: DISCONTINUED | OUTPATIENT
Start: 2024-06-19 | End: 2024-06-19 | Stop reason: HOSPADM

## 2024-06-19 NOTE — DISCHARGE INSTRUCTIONS

## 2024-06-19 NOTE — DISCHARGE SUMMARY
Discharge Note  Short Stay      SUMMARY     Admit Date: 6/19/2024    Attending Physician: Delbert Elkins MD      Discharge Physician: Emmanuel Trevino MD      Discharge Date: 6/19/2024 11:27 AM    Procedure(s) (LRB):  CRYOABLATION LEFT SUPRASCAPULAR NERVE WITH ULTRASOUND (Left)    Final Diagnosis: Suprascapular nerve entrapment, left [G56.82]    Disposition: Home or self care    Patient Instructions:   Current Discharge Medication List        CONTINUE these medications which have NOT CHANGED    Details   albuterol-ipratropium 2.5mg-0.5mg/3mL (DUO-NEB) 0.5 mg-3 mg(2.5 mg base)/3 mL nebulizer solution Take 3 mLs by nebulization every 4 (four) hours as needed for Wheezing.  Qty: 100 vial, Refills: 3      alendronate (FOSAMAX) 70 MG tablet Take 70 mg by mouth every 7 days.      amLODIPine (NORVASC) 10 MG tablet Take 10 mg by mouth.      atenoloL (TENORMIN) 50 MG tablet Take by mouth.      compress.stocking,knee,reg,med Misc 2 each by Misc.(Non-Drug; Combo Route) route once daily.  Qty: 2 each, Refills: 0      estradioL (ESTRACE) 0.01 % (0.1 mg/gram) vaginal cream 0.5 grams with applicator or dime-sized amount with finger in vagina nightly x 2 weeks, then twice a week thereafter  Qty: 42.5 g, Refills: 11    Associated Diagnoses: Vaginal atrophy      fluticasone-salmeterol 250-50 mcg/dose (ADVAIR) 250-50 mcg/dose diskus inhaler Inhale 1 puff into the lungs 2 (two) times daily.  Qty: 60 each, Refills: 11    Associated Diagnoses: Asthma, mild intermittent, uncomplicated      furosemide (LASIX) 20 MG tablet Take 1 tablet (20 mg total) by mouth daily as needed (leg swelling).  Qty: 14 tablet, Refills: 0      gabapentin (NEURONTIN) 300 MG capsule Take 1 capsule (300 mg total) by mouth 3 (three) times daily.  Qty: 90 capsule, Refills: 2    Associated Diagnoses: Lumbar radiculopathy; Spondylosis with myelopathy; DDD (degenerative disc disease); Right hip pain      hydroCHLOROthiazide (HYDRODIURIL) 25 MG tablet Take 25 mg by  mouth.      HYDROcodone-acetaminophen (NORCO) 5-325 mg per tablet Take 1 tablet by mouth every 4 (four) hours as needed for Pain.  Qty: 12 tablet, Refills: 0    Comments: Quantity prescribed more than 7 day supply? No  Associated Diagnoses: Chronic left shoulder pain      ibuprofen (ADVIL,MOTRIN) 800 MG tablet Take 1 tablet (800 mg total) by mouth every 6 (six) hours as needed for Pain.  Qty: 20 tablet, Refills: 0      ondansetron (ZOFRAN-ODT) 4 MG TbDL Take 1 tablet (4 mg total) by mouth every 6 (six) hours as needed.  Qty: 20 tablet, Refills: 2      vibegron (GEMTESA) 75 mg Tab Take 1 tablet (75 mg total) by mouth once daily.  Qty: 30 tablet, Refills: 11    Associated Diagnoses: Urinary incontinence, mixed      VITAMIN D2 1,250 mcg (50,000 unit) capsule Take 50,000 Units by mouth every 7 days.                 Discharge Diagnosis: Suprascapular nerve entrapment, left [G56.82]  Condition on Discharge: Stable with no complications to procedure   Diet on Discharge: Same as before.  Activity: as per instruction sheet.  Discharge to: Home with a responsible adult.  Follow up: 2-4 weeks       Please call my office, on call number [phone] or pager at [phone] if experienced any weakness or loss of sensation, fever > 101.5, pain uncontrolled with oral medications, persistent nausea/vomiting/or diarrhea, redness or drainage from the incisions, or any other worrisome concerns. If physician on call was not reached or could not communicate with our office for any reason please go to the nearest emergency department

## 2024-06-19 NOTE — OP NOTE
Therapeutic Shoulder Peripheral Cooled Nerve Radiofrequency Ablation under Fluoroscopic Guidance    The procedure, risks, benefits, and options were discussed with the patient. There are no contraindications to the procedure. The patent expressed understanding and agreed to the procedure. Informed written consent was obtained prior to the start of the procedure and can be found in the patient's chart.    PATIENT NAME: Kate Taylor   MRN: [id number]     DATE OF PROCEDURE: 06/19/2024    PROCEDURE: Therapeutic Left Shoulder Peripheral Cryoablation under Ultrasound Guidance    PRE-OP DIAGNOSIS: Suprascapular nerve entrapment, left [G56.82]  Chronic shoulder pain  Frozen shoulder    POST-OP DIAGNOSIS: Same    PHYSICIAN: Delbert Elkins MD    ASSISTANTS: Roberth Ellis MD Resident    MEDICATIONS INJECTED: Preservative-free Kenalog 40mg with 9cc of Bupivacine 0.25%     LOCAL ANESTHETIC INJECTED: Xylocaine 2%     SEDATION: Versed 2mg                                                                                                                                                                          Conscious sedation ordered by M.D. Patient re-evaluation prior to administration of conscious sedation. No changes noted in patient's status from initial evaluation. The patient's vital signs were monitored by RN and patient remained hemodynamically stable throughout the procedure.    Event Time In   Sedation Start 1134   Sedation End 1152     ESTIMATED BLOOD LOSS: None    COMPLICATIONS: None    INTERVAL HISTORY: Patient has clinical findings of chronic shoulder pain. Patients has completed previous diagnostic peripheral shoulder nerve blocks with at least 80% relief for the expected duration of the local anesthetic utilized.     TECHNIQUE: Time-out was performed to identify the patient and procedure to be performed. The patient was placed on the exam table in a comfortable right lateral decubitus position. The target site  for needle placement was identified by manual palpation with ultrasound confirmation of the location of the suprascapular nerve. The surgical area was prepped and draped in the usual sterile fashion using ChloraPrep and a fenestrated drape. Skin anesthesia was achieved by injecting Lidocaine 2% over the injection sites.     The cryoneurolysis 90 mm probe was introduced under ultrasound guidance. Cryoneurolysis was performed for 4 cycles using liquid nitrous under appropriate pressure delivered by smart Iovera system. 5 cc of bupivacaine 0.5% was injected at the end of procedure. There were no complications with the procedure     The patient was monitored after the procedure in the recovery area. They were given post-procedure and discharge instructions to follow at home. The patient was discharged in a stable condition.    Emmanuel Trevino MD    I reviewed and edited the fellow's note. I conducted my own interview and physical examination. I agree with the findings. I was present and supervising all critical portions of the procedure.    Delbert Elkins MD

## 2024-06-19 NOTE — H&P
HPI  Patient presenting for Procedure(s) (LRB):  CRYOABLATION LEFT SUPRASCAPULAR NERVE WITH ULTRASOUND (Left)     Patient on Anti-coagulation No    No health changes since previous encounter    Past Medical History:   Diagnosis Date    Asthma     Depression     Hypertension     Sarcoidosis of lung      Past Surgical History:   Procedure Laterality Date    APPENDECTOMY      COLONOSCOPY N/A 2/29/2024    Procedure: COLONOSCOPY;  Surgeon: Clementina Snyder MD;  Location: [facility];  Service: Colon and Rectal;  Laterality: N/A;    HYSTERECTOMY      DUB and cervical dysplasia.     SINUS SURGERY  5/19/2013    surgery X 3    TONSILLECTOMY       Review of patient's allergies indicates:   Allergen Reactions    Lisinopril Swelling      No current facility-administered medications for this encounter.       PMHx, PSHx, Allergies, Medications reviewed in epic    ROS negative except pain complaints in HPI    OBJECTIVE:    PHYSICAL EXAMINATION:    GENERAL: Well appearing, in no acute distress, alert and oriented x3.  PSYCH:  Mood and affect appropriate.  SKIN: Patchy duskiness/erythema noted to bilateral ankles/feet w/ TTP  CV: Edema noted to bilateral lower legs and feet.  PULM: No evidence of respiratory difficulty, symmetric chest rise. Clear to auscultation.  NEURO: Cranial nerves grossly intact.    Plan:  Patient seen and evaluated by staff. BLE edema discussed with patient, and she would like to proceed with procedure today. We recommend going to ED for further evaluation following procedure, and she voiced her understanding.     Proceed with procedure as planned Procedure(s) (LRB):  CRYOABLATION LEFT SUPRASCAPULAR NERVE WITH ULTRASOUND (Left)    Emmanuel Trevino MD  06/19/2024

## 2024-06-28 ENCOUNTER — OFFICE VISIT (OUTPATIENT)
Dept: SURGERY | Facility: CLINIC | Age: 69
End: 2024-06-28
Payer: MEDICARE

## 2024-06-28 VITALS
HEIGHT: 62 IN | HEART RATE: 85 BPM | RESPIRATION RATE: 19 BRPM | SYSTOLIC BLOOD PRESSURE: 129 MMHG | DIASTOLIC BLOOD PRESSURE: 73 MMHG | OXYGEN SATURATION: 95 % | WEIGHT: 129.88 LBS | BODY MASS INDEX: 23.9 KG/M2

## 2024-06-28 DIAGNOSIS — J45.909 ASTHMA, UNSPECIFIED ASTHMA SEVERITY, UNSPECIFIED WHETHER COMPLICATED, UNSPECIFIED WHETHER PERSISTENT: Primary | ICD-10-CM

## 2024-06-28 PROCEDURE — 3078F DIAST BP <80 MM HG: CPT | Mod: CPTII,S$GLB,, | Performed by: SURGERY

## 2024-06-28 PROCEDURE — 1125F AMNT PAIN NOTED PAIN PRSNT: CPT | Mod: CPTII,S$GLB,, | Performed by: SURGERY

## 2024-06-28 PROCEDURE — 99999 PR PBB SHADOW E&M-EST. PATIENT-LVL III: CPT | Mod: PBBFAC,,, | Performed by: SURGERY

## 2024-06-28 PROCEDURE — 99213 OFFICE O/P EST LOW 20 MIN: CPT | Mod: S$GLB,,, | Performed by: SURGERY

## 2024-06-28 PROCEDURE — 3074F SYST BP LT 130 MM HG: CPT | Mod: CPTII,S$GLB,, | Performed by: SURGERY

## 2024-06-28 PROCEDURE — 3008F BODY MASS INDEX DOCD: CPT | Mod: CPTII,S$GLB,, | Performed by: SURGERY

## 2024-06-28 NOTE — PROGRESS NOTES
Colon & Rectal Surgery Clinic Follow Up    HPI:   Kate Taylor is a 69 y.o. female who presents for follow up of rectal prolapse     Interval history:   Missed appt with Kimmy and surgery pushed back to 7.25.  Was in the ED 6/19 with asthma exacerbation.  Has recently established with new PCP at [facility] .  Patient reports worsening prolapse, reviewed pictures on patient's phone consistent with rectal prolapse       Objective:   Vitals:    06/28/24 1022   BP: 129/73   Pulse: 85   Resp: 19        Physical Exam  Vitals reviewed.   Constitutional:       Appearance: Normal appearance.   HENT:      Head: Normocephalic and atraumatic.      Mouth/Throat:      Mouth: Mucous membranes are moist.   Eyes:      Extraocular Movements: Extraocular movements intact.      Pupils: Pupils are equal, round, and reactive to light.   Cardiovascular:      Rate and Rhythm: Normal rate and regular rhythm.   Pulmonary:      Effort: Pulmonary effort is normal.   Abdominal:      General: Abdomen is flat.      Palpations: Abdomen is soft.   Musculoskeletal:         General: Normal range of motion.   Skin:     General: Skin is warm.      Capillary Refill: Capillary refill takes less than 2 seconds.   Neurological:      General: No focal deficit present.      Mental Status: She is alert and oriented to person, place, and time.   Psychiatric:         Mood and Affect: Mood normal.         Behavior: Behavior normal.    Anorectal: no external masses or lesions, partial prolapse on valsalva       Assessment and Plan:   Kate Taylor  is a 69 y.o. female who presents for follow up of rectal prolapse     - Only partial prolapse visualized on exam today, but patient with confirmed full thickness prolapse in photos in media  - We discussed risks, benefits and alternatives of robotic suture rectopexy and consent signed   - Given recent asthma exacerbation and significant coughing, we discussed need for evaluation by pulmonology prior to  proceeding with surgery.  Urgent referral placed.   - to OR 7/25 in conjunction with Dr. Rausch.       Clementina Snyder MD  Staff Surgeon   Colon & Rectal Surgery

## 2024-07-01 DIAGNOSIS — D86.9 SARCOID: Primary | ICD-10-CM

## 2024-07-01 DIAGNOSIS — J45.901 EXACERBATION OF ASTHMA, UNSPECIFIED ASTHMA SEVERITY, UNSPECIFIED WHETHER PERSISTENT: ICD-10-CM

## 2024-07-01 DIAGNOSIS — J45.42 MODERATE PERSISTENT ASTHMA WITH STATUS ASTHMATICUS: ICD-10-CM

## 2024-07-02 ENCOUNTER — PATIENT MESSAGE (OUTPATIENT)
Dept: CARDIOLOGY | Facility: CLINIC | Age: 69
End: 2024-07-02
Payer: MEDICARE

## 2024-07-02 ENCOUNTER — TELEPHONE (OUTPATIENT)
Dept: CARDIOLOGY | Facility: CLINIC | Age: 69
End: 2024-07-02
Payer: MEDICARE

## 2024-07-02 NOTE — TELEPHONE ENCOUNTER
----- Message from Thiago Bowden MD sent at 7/1/2024  5:03 PM CDT -----  Regarding: RE: APPOINTMENT  I am not scheduling anyone early for surgical clearance unless the surgeon reaches out to me directly to discuss their concerns and why they need to be seen urgently. Most of this is coming from anesthesia CRNAs or surgery office staff and not required.    -Thiago  ----- Message -----  From: Ryann Shepard MA  Sent: 7/1/2024   2:35 PM CDT  To: Thiago Bowden MD  Subject: FW: APPOINTMENT                                  What do you think?  ----- Message -----  From: Poppy Rebolledo  Sent: 7/1/2024   1:42 PM CDT  To: Kal Das Staff  Subject: APPOINTMENT                                      Hello,  This patient is scheduled to have surgery July 25th by Dr. Rausch and she needs clearance because she has Asthma.  Patient appointment with Dr. Bowden is scheduled in September.  Is there any way you can get her in before her surgery?  Please let us know.  Thanks!

## 2024-07-03 ENCOUNTER — TELEPHONE (OUTPATIENT)
Dept: UROGYNECOLOGY | Facility: CLINIC | Age: 69
End: 2024-07-03
Payer: MEDICARE

## 2024-07-03 NOTE — TELEPHONE ENCOUNTER
----- Message from Mihir Rausch MD sent at 7/2/2024  9:15 PM CDT -----  Regarding: RE: Can you please help patient make appt with pulmonary sooner  Great! Can you please call patient tomorrow and confirm that she had appt made, then let us know? Thanks!  ----- Message -----  From: Poppy Rebolledo  Sent: 7/2/2024   1:13 PM CDT  To: Mihir Rausch MD  Subject: RE: Can you please help patient make appt wiJose Alfredo Salgado,  Just received a call from Dr. Santamaria office and he is out of town but they said they will get her scheduled to see another pulmonary doctor on the 16th of this month.  Thanks!  ----- Message -----  From: Mihir Rausch MD  Sent: 7/1/2024  12:00 PM CDT  To: Kimmy Boss NP; Clementina Snyder MD; #  Subject: Can you please help patient make appt with pJose Alfredo Mcwilliams! Can you please help patient make appt with pulmonary in next 1-2 weeks? She has surgery with Dr. Snyder and myself 7-25, and they pulm appt she has currently isn't until 9-2024. She was having an asthma exacerbation with her sarcoid, and we just need that controlled before any surgery.  I was trying to book, but it keeps saying: The patient's insurance has no benefits for services at this location. The patient will have to pay out of pocket for this visit. The patient can contact their insurance carrier to locate providers in their network. By proceeding I acknowledge that I have shared this information with the patient.    So, can we figure out what's going on? Her 9-2024 appt is at [facility], but it still gives me that statement if I try to book earlier at [facility].     Please let me know what happens--thanks!  ----- Message -----  From: Clementina Snyder MD  Sent: 6/28/2024   2:11 PM CDT  To: Kimmy Boss NP; Mihir Rausch MD; #    FYI I put in an urgent referral to pulmonology as Ms. Taylor was in the ED 6/19 for another asthma exacerbation.  Patient had significant coughing with resulting prolapse.  She just re-established  with a new primary NP at [facility] and has not had any optimization of her asthma.  We will follow up and try to get her seen ASAP.     Clementina

## 2024-07-08 DIAGNOSIS — J44.9: Primary | ICD-10-CM

## 2024-07-15 ENCOUNTER — PROCEDURE VISIT (OUTPATIENT)
Dept: UROGYNECOLOGY | Facility: CLINIC | Age: 69
End: 2024-07-15
Payer: MEDICARE

## 2024-07-15 ENCOUNTER — OFFICE VISIT (OUTPATIENT)
Dept: UROGYNECOLOGY | Facility: CLINIC | Age: 69
End: 2024-07-15
Payer: MEDICARE

## 2024-07-15 ENCOUNTER — ANESTHESIA EVENT (OUTPATIENT)
Dept: SURGERY | Facility: OTHER | Age: 69
End: 2024-07-15
Payer: MEDICARE

## 2024-07-15 VITALS
BODY MASS INDEX: 23.77 KG/M2 | SYSTOLIC BLOOD PRESSURE: 141 MMHG | HEART RATE: 83 BPM | DIASTOLIC BLOOD PRESSURE: 95 MMHG | WEIGHT: 129.19 LBS | HEIGHT: 62 IN

## 2024-07-15 VITALS
WEIGHT: 129.19 LBS | HEART RATE: 83 BPM | SYSTOLIC BLOOD PRESSURE: 141 MMHG | HEIGHT: 62 IN | DIASTOLIC BLOOD PRESSURE: 95 MMHG | BODY MASS INDEX: 23.77 KG/M2

## 2024-07-15 DIAGNOSIS — N99.3 VAGINAL VAULT PROLAPSE, POSTHYSTERECTOMY: ICD-10-CM

## 2024-07-15 DIAGNOSIS — K62.3 RECTAL PROLAPSE: ICD-10-CM

## 2024-07-15 DIAGNOSIS — J45.21 MILD INTERMITTENT ASTHMA WITH ACUTE EXACERBATION: Primary | ICD-10-CM

## 2024-07-15 DIAGNOSIS — M79.18 MYALGIA OF PELVIC FLOOR: ICD-10-CM

## 2024-07-15 DIAGNOSIS — N39.46 URINARY INCONTINENCE, MIXED: ICD-10-CM

## 2024-07-15 DIAGNOSIS — M62.89 PELVIC FLOOR DYSFUNCTION: ICD-10-CM

## 2024-07-15 DIAGNOSIS — N95.2 VAGINAL ATROPHY: ICD-10-CM

## 2024-07-15 LAB
BILIRUB SERPL-MCNC: NORMAL MG/DL
BLOOD URINE, POC: NORMAL
CLARITY, POC UA: CLEAR
COLOR, POC UA: NORMAL
GLUCOSE UR QL STRIP: NORMAL
KETONES UR QL STRIP: NORMAL
LEUKOCYTE ESTERASE URINE, POC: NORMAL
NITRITE, POC UA: NORMAL
PH, POC UA: 6
PROTEIN, POC: NORMAL
SPECIFIC GRAVITY, POC UA: 1.01
UROBILINOGEN, POC UA: NORMAL

## 2024-07-15 PROCEDURE — 81002 URINALYSIS NONAUTO W/O SCOPE: CPT | Mod: S$GLB,,, | Performed by: OBSTETRICS & GYNECOLOGY

## 2024-07-15 PROCEDURE — 51728 CYSTOMETROGRAM W/VP: CPT | Mod: S$GLB,,, | Performed by: OBSTETRICS & GYNECOLOGY

## 2024-07-15 PROCEDURE — 51797 INTRAABDOMINAL PRESSURE TEST: CPT | Mod: S$GLB,,, | Performed by: OBSTETRICS & GYNECOLOGY

## 2024-07-15 PROCEDURE — 51741 ELECTRO-UROFLOWMETRY FIRST: CPT | Mod: 51,S$GLB,, | Performed by: OBSTETRICS & GYNECOLOGY

## 2024-07-15 PROCEDURE — 99499 UNLISTED E&M SERVICE: CPT | Mod: S$GLB,,, | Performed by: NURSE PRACTITIONER

## 2024-07-15 PROCEDURE — 51784 ANAL/URINARY MUSCLE STUDY: CPT | Mod: 51,S$GLB,, | Performed by: OBSTETRICS & GYNECOLOGY

## 2024-07-15 PROCEDURE — 99999 PR PBB SHADOW E&M-EST. PATIENT-LVL III: CPT | Mod: PBBFAC,,, | Performed by: NURSE PRACTITIONER

## 2024-07-15 PROCEDURE — 52000 CYSTOURETHROSCOPY: CPT | Mod: 59,S$GLB,, | Performed by: OBSTETRICS & GYNECOLOGY

## 2024-07-15 RX ORDER — LIDOCAINE HYDROCHLORIDE 20 MG/ML
JELLY TOPICAL ONCE
Status: COMPLETED | OUTPATIENT
Start: 2024-07-15 | End: 2024-07-15

## 2024-07-15 RX ORDER — CIPROFLOXACIN 500 MG/1
500 TABLET ORAL
Status: COMPLETED | OUTPATIENT
Start: 2024-07-15 | End: 2024-07-15

## 2024-07-15 RX ADMIN — LIDOCAINE HYDROCHLORIDE 5 ML: 20 JELLY TOPICAL at 04:07

## 2024-07-15 RX ADMIN — CIPROFLOXACIN 500 MG: 500 TABLET ORAL at 04:07

## 2024-07-15 NOTE — H&P (VIEW-ONLY)
Urogyn follow up    [facility] - UROGYNECOLOGY  [address] [address]    Kate Taylor  [id number]  1955      Kate Taylor is a 69 y.o. here for preop.     Last HPI from 02/19/2024  1)  UI:  (+) MARGE > (+) UUI (if holds too long) X years. (+) pads:2/day, usually minimum wetness and 1/night usually minimum wetness unless coughs/sneezes.  Daytime frequency: Q 2 hours.  Nocturia: Yes: 1/night.   (--) dysuria,  (--) hematuria,  (--) frequent UTIs.  (+) complete bladder emptying.      2)  POP:  Present. above introitus. Worsening.  Worse when on feet or after diarrhea.  Symptoms:(+)  pressure, discomfort.  (--) vaginal bleeding. (--) vaginal discharge. (--) sexually active.  (+) dyspareunia--last 10 years ago, ? Due to POP.   (--)  Vaginal dryness.  (--) vaginal estrogen use.       POP-Q:  Aa -2; Ba -2; C -8; Ap -2; Bp -2.  Genital hiatus 2, perineal body 2. total vaginal length 9. (Standing)  Pvr 20 mL     3)  BM:  (+) constipation/straining. Intermittent with  (+) chronic diarrhea. Takes miralax PRN for constipation.  Not taking recommended fiber.  (--) hematochezia.  (+) fecal incontinence--mostly with diarrhea.  (+) fecal smearing/urgency.  (--) complete evacuation.  +rectal prolapse: Has photos of prolapse at home which show cystocele and full thickness rectal prolapse.      05/07/2024  1)  L rotator cuff tear:  --saw ortho--had injection  --Scheduled to follow up     2)  Rectal prolapse:  --surgery per Dr. Snyder        3)  ?pelvic organ prolapse:  --no major on exam on initial exam  --has seen bulge in past (identified area with mirror--seems to be at introitus)--showed phone photo--scanned into media:  --mri defacography: 05/01/2024  1.    Anatomic findings: Post hysterectomy.  Normal levator hiatus and anorectal junction.   2.    Anterior compartment findings: Mild cystocele.   3.    Middle compartment findings: Mild vaginal prolapse.   4.    Posterior compartment findings: Mild  rectocele.       4)  Vaginal atrophy (dryness):    --did not start vaginal estrogen cream     5)  Straining with stools (intermittent C/D) + fecal incontinence:  --reports fecal incontinence  --taking fiber supplement  --rectal prolapse worse with asthma exacerbation     6)  Mixed urinary incontinence, urge < stress:    --went to pelvic floor PT x 2 sessions  --+MARGE/ UUI    Changes from last visit:  7/21/2024 UDS:  1.  VOIDING PHASE:       a.  Uroflowmetry:  Prolapse reduction: No  Voided volume:  19 mL   Voiding time:   13 seconds  Max flow:  4 mL/s  Avg flow:   2 mL/s   PVR:   0 mL     The overall configuration of this uroflow study was  with insufficient volume for interpretation.       b.  Pressure flow:  Prolapse reduction: No  Voided volume:   116 mL  Voiding time:   64 seconds  Peak flow:  3 mL/s   Avg flow:  1 mL/s  Max det pressure:  31  cm H20  Det pressure at max flow: 17 cm H20  Void initiated by detrusor contraction interrupted by Valsalva.    Urethral relaxation (EMG):  present.    PVR (calculated):  213 mL.  After removal of catheters, the patient was able to void 250 mL in hat on toilet.     The overall configuration of this pressure flow study was with concern for voiding dysfunction (Valsalva assist).       2.  FILLING PHASE:  1st desire: 52 mL  Normal desire:  192 mL  Strong desire: 275 mL  Urgency:  332 mL  Compliance (calculated) approx 300 mL/cm H20  EMG activity during filling: unchanged  Detrusor contractions observed: No.      3.  URETHRAL FUNCTION/STORAGE PHASE:     a.  WITH prolapse reduction:  CLPP (153 mL): Positive  at  126 cm H20  VLPP (153 mL): Positive  at  84 cm H20   CLPP (300 mL): Positive  at  102 cm H20  VLPP (300 mL): Positive  at  119 cm H20      These findings are consistent with Positive urodynamic stress incontinence.     Assessment:  UF with insufficient volume for interpretation.   PF with concern for voiding dysfunction (Valsalva assist).  Compliance normal.  Max  capacity 332 mL.  DO (--).  MARGE (+).    --cysto: normal    Past Medical History:   Diagnosis Date    Asthma     Bladder prolapse, female, acquired     Depression     Hypertension     Rectal prolapse     Sarcoidosis of lung     Unspecified glaucoma    --Asthma: supposed to use advair + duonebs  GERD  Hypercholesterolemia  Low back pain: was taking gabapentin/muscle relaxer--stopped due to unstable gait; now taking ibuprofen 800 mg daily  Torn rotator cuff L--followed by ortho, hasn't healed, hasn't followed up again    Past Surgical History:   Procedure Laterality Date    APPENDECTOMY      COLONOSCOPY N/A 2/29/2024    Procedure: COLONOSCOPY;  Surgeon: Clementina Snyder MD;  Location: [facility] ENDO;  Service: Colon and Rectal;  Laterality: N/A;    HYSTERECTOMY      DUB and cervical dysplasia.     RADIOFREQUENCY ABLATION Left 6/19/2024    Procedure: CRYOABLATION LEFT SUPRASCAPULAR NERVE WITH ULTRASOUND;  Surgeon: Delbert Elkins MD;  Location: [facility] PAIN MGT;  Service: Pain Management;  Laterality: Left;  [phone]  4 WK F/U PRETTY    SINUS SURGERY  5/19/2013    surgery X 3    TONSILLECTOMY     Hysterectomy: Yes   Date: 33 yo.  Indication: AUB, cervical dysplasia.    Type: xlap/Pfannenstiel  Cervix present: No  Ovaries present: No. Was on HRT.   Other procedures at time of hysterectomy:  appy    Family History   Problem Relation Name Age of Onset    Hypertension Mother      Heart disease Mother      Stroke Mother      Hypertension Sister      Hypertension Brother      COPD Brother      Kidney disease Brother      Schizophrenia Brother      Diabetes Neg Hx      Cancer Neg Hx         Social History     Socioeconomic History    Marital status:     Number of children: 3   Occupational History    Occupation: Registered Nurse     Employer: [employer]   Tobacco Use    Smoking status: Former     Current packs/day: 0.00     Types: Cigarettes     Quit date: 2/27/2014     Years since quitting: 10.4    Smokeless  tobacco: Never    Tobacco comments:     Report quit last week   Substance and Sexual Activity    Alcohol use: Yes     Comment: social; none before surgery    Drug use: No    Sexual activity: Not Currently     Social Determinants of Health     Financial Resource Strain: Low Risk  (7/24/2024)    Overall Financial Resource Strain (CARDIA)     Difficulty of Paying Living Expenses: Not hard at all   Food Insecurity: No Food Insecurity (7/24/2024)    Hunger Vital Sign     Worried About Running Out of Food in the Last Year: Never true     Ran Out of Food in the Last Year: Never true   Transportation Needs: Unmet Transportation Needs (7/24/2024)    TRANSPORTATION NEEDS     Transportation : Yes, it has kept me from medical appointments or from getting my medications.   Physical Activity: Insufficiently Active (7/24/2024)    Exercise Vital Sign     Days of Exercise per Week: 1 day     Minutes of Exercise per Session: 20 min   Stress: No Stress Concern Present (7/24/2024)    [employer] [place] [employer] - Occupational Stress Questionnaire     Feeling of Stress : Not at all   Housing Stability: Low Risk  (7/24/2024)    Housing Stability Vital Sign     Unable to Pay for Housing in the Last Year: No     Homeless in the Last Year: No       Current Outpatient Medications   Medication Sig Dispense Refill    albuterol-ipratropium 2.5mg-0.5mg/3mL (DUO-NEB) 0.5 mg-3 mg(2.5 mg base)/3 mL nebulizer solution Take 3 mLs by nebulization every 4 (four) hours as needed for Wheezing. 100 vial 3    amLODIPine (NORVASC) 10 MG tablet Take 10 mg by mouth.      atenoloL (TENORMIN) 50 MG tablet Take 50 mg by mouth 2 (two) times daily.      compress.stocking,knee,reg,med Misc 2 each by Misc.(Non-Drug; Combo Route) route once daily. 2 each 0    hydroCHLOROthiazide (HYDRODIURIL) 25 MG tablet Take 25 mg by mouth once daily.      ibuprofen (ADVIL,MOTRIN) 800 MG tablet Take 1 tablet (800 mg total) by mouth every 6 (six) hours as needed for  "Pain. 20 tablet 0    VITAMIN D2 1,250 mcg (50,000 unit) capsule Take 50,000 Units by mouth every 7 days.      albuterol (PROVENTIL/VENTOLIN HFA) 90 mcg/actuation inhaler Inhale 2 puffs into the lungs every 6 (six) hours as needed.      alendronate (FOSAMAX) 70 MG tablet Take 70 mg by mouth every 7 days.      atorvastatin (LIPITOR) 40 MG tablet Take 40 mg by mouth once daily.      bisacodyL (DULCOLAX) 5 mg EC tablet Take 5 mg by mouth.      cyclobenzaprine (FLEXERIL) 10 MG tablet Take 10 mg by mouth 3 (three) times daily as needed.      fluticasone-salmeterol 250-50 mcg/dose (ADVAIR) 250-50 mcg/dose diskus inhaler Inhale 1 puff into the lungs 2 (two) times daily. 60 each 11    furosemide (LASIX) 20 MG tablet Take 1 tablet (20 mg total) by mouth daily as needed (leg swelling). (Patient taking differently: Take 20 mg by mouth. PO ON MONDAY, WEDNESDAY, FRIDAY ONLY) 14 tablet 0    ondansetron (ZOFRAN-ODT) 4 MG TbDL Take 1 tablet (4 mg total) by mouth every 6 (six) hours as needed. (Patient not taking: Reported on 7/15/2024) 20 tablet 2    polyethylene glycol (GLYCOLAX) 17 gram/dose powder Take 17 g by mouth once daily.      sertraline (ZOLOFT) 100 MG tablet Take 100 mg by mouth once daily.       No current facility-administered medications for this visit.       Review of patient's allergies indicates:   Allergen Reactions    Lisinopril Swelling       Well woman:  Pap test: post hyst.  History of abnormal paps: Yes.  History of STIs:  No  Mammogram: Date of last: 8/2023.  Result: Normal  Colonoscopy: Date of last:2/2024.  Result:  normal. Repeat due:  2034.   DEXA:  Date of last: 2023.  Result:  osteoporosis--fosamax, D.  Repeat due:  per PCP.     ROS:  As per HPI.      Exam  BP (!) 141/95 (BP Location: Right arm, Patient Position: Sitting, BP Method: Medium (Manual))   Pulse 83   Ht 5' 2" (1.575 m)   Wt 58.6 kg (129 lb 3 oz)   BMI 23.63 kg/m²   General: alert and oriented, no acute distress  Respiratory: normal " respiratory effort  Abd: soft, non-tender, non-distended    Pelvic--deferred    Impression  1. Mild intermittent asthma with acute exacerbation        2. Rectal prolapse        3. Myalgia of pelvic floor        4. Pelvic floor dysfunction        5. Vaginal vault prolapse, posthysterectomy        6. Vaginal atrophy        7. Urinary incontinence, mixed          We reviewed the above issues and discussed options for short-term versus long-term management of her problems.   Plan:   Patient consented for robotic assisted aparoscopic sacrocolpopexy with synthetic mesh, possible uterosacral vs sacrospinous ligament suspension, possible anterior/posterior repair with perineorrhaphy, possible laparotomy, placement of synthetic midurethral sling vs periurethral bulking (will confirm smoking status), and cystourethroscopy.   R/B/A reviewed. Specific risks reviewed include:  infection, bleeding, need for blood transfusion, damage to surrounding structures, anesthesia risks, death, heart attack, stroke, mesh erosion/extrusion, pain, dyspareunia, urinary retention, voiding dysfunction, urinary incontinence, exacerbation of urinary urge incontinence, and need for further surgeries.  We reviewed potential for failure of POP defect repair and need for future surgery, with no way of predicting risk.  She understands success rate of ASC approaches 85%.  Success rate of midurethral sling for MARGE was reviewed as 80-85%, and she understands that this will not necessarily impact other types of urinary incontinence.  Alternatives reviewed include: pessary/PT for POP and pessary/periurethral injections/PT/medication for MARGE.    T&S, urine HCG on DOS  Preoperative appointment with PCP or cardiology: Yes -pulmonary received and scanned into media  Preop nebs  Postop OTC nebs + advair BID  VTE Prophylaxis:  heparin 5000 u SQ TID (1st dose 2hrs preop) + TEDs/SCDs  Patient instructed on Magnesium citrate and chlorahexadine/dial soap prep to  perform day before & AM of surgery.   Proceed to OR for above-mentioned procedure.    20 minutes were spent in face to face time with this patient  90 % of this time was spent in counseling and/or coordination of care  ME@ [facility]  Division of Female Pelvic Medicine and Reconstructive Surgery  Department of Obstetrics & Gynecology

## 2024-07-15 NOTE — PROGRESS NOTES
Urogyn follow up    [facility] - UROGYNECOLOGY  [address] [address]    Kate Taylor  [id number]  1955      Kate Taylor is a 69 y.o. here for preop.     Last HPI from 02/19/2024  1)  UI:  (+) MARGE > (+) UUI (if holds too long) X years. (+) pads:2/day, usually minimum wetness and 1/night usually minimum wetness unless coughs/sneezes.  Daytime frequency: Q 2 hours.  Nocturia: Yes: 1/night.   (--) dysuria,  (--) hematuria,  (--) frequent UTIs.  (+) complete bladder emptying.      2)  POP:  Present. above introitus. Worsening.  Worse when on feet or after diarrhea.  Symptoms:(+)  pressure, discomfort.  (--) vaginal bleeding. (--) vaginal discharge. (--) sexually active.  (+) dyspareunia--last 10 years ago, ? Due to POP.   (--)  Vaginal dryness.  (--) vaginal estrogen use.       POP-Q:  Aa -2; Ba -2; C -8; Ap -2; Bp -2.  Genital hiatus 2, perineal body 2. total vaginal length 9. (Standing)  Pvr 20 mL     3)  BM:  (+) constipation/straining. Intermittent with  (+) chronic diarrhea. Takes miralax PRN for constipation.  Not taking recommended fiber.  (--) hematochezia.  (+) fecal incontinence--mostly with diarrhea.  (+) fecal smearing/urgency.  (--) complete evacuation.  +rectal prolapse: Has photos of prolapse at home which show cystocele and full thickness rectal prolapse.      05/07/2024  1)  L rotator cuff tear:  --saw ortho--had injection  --Scheduled to follow up     2)  Rectal prolapse:  --surgery per Dr. Snyder        3)  ?pelvic organ prolapse:  --no major on exam on initial exam  --has seen bulge in past (identified area with mirror--seems to be at introitus)--showed phone photo--scanned into media:  --mri defacography: 05/01/2024  1.    Anatomic findings: Post hysterectomy.  Normal levator hiatus and anorectal junction.   2.    Anterior compartment findings: Mild cystocele.   3.    Middle compartment findings: Mild vaginal prolapse.   4.    Posterior compartment findings: Mild  rectocele.       4)  Vaginal atrophy (dryness):    --did not start vaginal estrogen cream     5)  Straining with stools (intermittent C/D) + fecal incontinence:  --reports fecal incontinence  --taking fiber supplement  --rectal prolapse worse with asthma exacerbation     6)  Mixed urinary incontinence, urge < stress:    --went to pelvic floor PT x 2 sessions  --+MARGE/ UUI    Changes from last visit:  7/21/2024 UDS:  1.  VOIDING PHASE:       a.  Uroflowmetry:  Prolapse reduction: No  Voided volume:  19 mL   Voiding time:   13 seconds  Max flow:  4 mL/s  Avg flow:   2 mL/s   PVR:   0 mL     The overall configuration of this uroflow study was  with insufficient volume for interpretation.       b.  Pressure flow:  Prolapse reduction: No  Voided volume:   116 mL  Voiding time:   64 seconds  Peak flow:  3 mL/s   Avg flow:  1 mL/s  Max det pressure:  31  cm H20  Det pressure at max flow: 17 cm H20  Void initiated by detrusor contraction interrupted by Valsalva.    Urethral relaxation (EMG):  present.    PVR (calculated):  213 mL.  After removal of catheters, the patient was able to void 250 mL in hat on toilet.     The overall configuration of this pressure flow study was with concern for voiding dysfunction (Valsalva assist).       2.  FILLING PHASE:  1st desire: 52 mL  Normal desire:  192 mL  Strong desire: 275 mL  Urgency:  332 mL  Compliance (calculated) approx 300 mL/cm H20  EMG activity during filling: unchanged  Detrusor contractions observed: No.      3.  URETHRAL FUNCTION/STORAGE PHASE:     a.  WITH prolapse reduction:  CLPP (153 mL): Positive  at  126 cm H20  VLPP (153 mL): Positive  at  84 cm H20   CLPP (300 mL): Positive  at  102 cm H20  VLPP (300 mL): Positive  at  119 cm H20      These findings are consistent with Positive urodynamic stress incontinence.     Assessment:  UF with insufficient volume for interpretation.   PF with concern for voiding dysfunction (Valsalva assist).  Compliance normal.  Max  capacity 332 mL.  DO (--).  MARGE (+).    --cysto: normal    Past Medical History:   Diagnosis Date    Asthma     Bladder prolapse, female, acquired     Depression     Hypertension     Rectal prolapse     Sarcoidosis of lung     Unspecified glaucoma    --Asthma: supposed to use advair + duonebs  GERD  Hypercholesterolemia  Low back pain: was taking gabapentin/muscle relaxer--stopped due to unstable gait; now taking ibuprofen 800 mg daily  Torn rotator cuff L--followed by ortho, hasn't healed, hasn't followed up again    Past Surgical History:   Procedure Laterality Date    APPENDECTOMY      COLONOSCOPY N/A 2/29/2024    Procedure: COLONOSCOPY;  Surgeon: Clementina Snyder MD;  Location: [facility] ENDO;  Service: Colon and Rectal;  Laterality: N/A;    HYSTERECTOMY      DUB and cervical dysplasia.     RADIOFREQUENCY ABLATION Left 6/19/2024    Procedure: CRYOABLATION LEFT SUPRASCAPULAR NERVE WITH ULTRASOUND;  Surgeon: Delbert Elkins MD;  Location: [facility] PAIN MGT;  Service: Pain Management;  Laterality: Left;  [phone]  4 WK F/U PRETTY    SINUS SURGERY  5/19/2013    surgery X 3    TONSILLECTOMY     Hysterectomy: Yes   Date: 33 yo.  Indication: AUB, cervical dysplasia.    Type: xlap/Pfannenstiel  Cervix present: No  Ovaries present: No. Was on HRT.   Other procedures at time of hysterectomy:  appy    Family History   Problem Relation Name Age of Onset    Hypertension Mother      Heart disease Mother      Stroke Mother      Hypertension Sister      Hypertension Brother      COPD Brother      Kidney disease Brother      Schizophrenia Brother      Diabetes Neg Hx      Cancer Neg Hx         Social History     Socioeconomic History    Marital status:     Number of children: 3   Occupational History    Occupation: Registered Nurse     Employer: [employer]   Tobacco Use    Smoking status: Former     Current packs/day: 0.00     Types: Cigarettes     Quit date: 2/27/2014     Years since quitting: 10.4    Smokeless  tobacco: Never    Tobacco comments:     Report quit last week   Substance and Sexual Activity    Alcohol use: Yes     Comment: social; none before surgery    Drug use: No    Sexual activity: Not Currently     Social Determinants of Health     Financial Resource Strain: Low Risk  (7/24/2024)    Overall Financial Resource Strain (CARDIA)     Difficulty of Paying Living Expenses: Not hard at all   Food Insecurity: No Food Insecurity (7/24/2024)    Hunger Vital Sign     Worried About Running Out of Food in the Last Year: Never true     Ran Out of Food in the Last Year: Never true   Transportation Needs: Unmet Transportation Needs (7/24/2024)    TRANSPORTATION NEEDS     Transportation : Yes, it has kept me from medical appointments or from getting my medications.   Physical Activity: Insufficiently Active (7/24/2024)    Exercise Vital Sign     Days of Exercise per Week: 1 day     Minutes of Exercise per Session: 20 min   Stress: No Stress Concern Present (7/24/2024)    [employer] - Occupational Stress Questionnaire     Feeling of Stress : Not at all   Housing Stability: Low Risk  (7/24/2024)    Housing Stability Vital Sign     Unable to Pay for Housing in the Last Year: No     Homeless in the Last Year: No       Current Outpatient Medications   Medication Sig Dispense Refill    albuterol-ipratropium 2.5mg-0.5mg/3mL (DUO-NEB) 0.5 mg-3 mg(2.5 mg base)/3 mL nebulizer solution Take 3 mLs by nebulization every 4 (four) hours as needed for Wheezing. 100 vial 3    amLODIPine (NORVASC) 10 MG tablet Take 10 mg by mouth.      atenoloL (TENORMIN) 50 MG tablet Take 50 mg by mouth 2 (two) times daily.      compress.stocking,knee,reg,med Misc 2 each by Misc.(Non-Drug; Combo Route) route once daily. 2 each 0    hydroCHLOROthiazide (HYDRODIURIL) 25 MG tablet Take 25 mg by mouth once daily.      ibuprofen (ADVIL,MOTRIN) 800 MG tablet Take 1 tablet (800 mg total) by mouth every 6 (six) hours as needed for  "Pain. 20 tablet 0    VITAMIN D2 1,250 mcg (50,000 unit) capsule Take 50,000 Units by mouth every 7 days.      albuterol (PROVENTIL/VENTOLIN HFA) 90 mcg/actuation inhaler Inhale 2 puffs into the lungs every 6 (six) hours as needed.      alendronate (FOSAMAX) 70 MG tablet Take 70 mg by mouth every 7 days.      atorvastatin (LIPITOR) 40 MG tablet Take 40 mg by mouth once daily.      bisacodyL (DULCOLAX) 5 mg EC tablet Take 5 mg by mouth.      cyclobenzaprine (FLEXERIL) 10 MG tablet Take 10 mg by mouth 3 (three) times daily as needed.      fluticasone-salmeterol 250-50 mcg/dose (ADVAIR) 250-50 mcg/dose diskus inhaler Inhale 1 puff into the lungs 2 (two) times daily. 60 each 11    furosemide (LASIX) 20 MG tablet Take 1 tablet (20 mg total) by mouth daily as needed (leg swelling). (Patient taking differently: Take 20 mg by mouth. PO ON MONDAY, WEDNESDAY, FRIDAY ONLY) 14 tablet 0    ondansetron (ZOFRAN-ODT) 4 MG TbDL Take 1 tablet (4 mg total) by mouth every 6 (six) hours as needed. (Patient not taking: Reported on 7/15/2024) 20 tablet 2    polyethylene glycol (GLYCOLAX) 17 gram/dose powder Take 17 g by mouth once daily.      sertraline (ZOLOFT) 100 MG tablet Take 100 mg by mouth once daily.       No current facility-administered medications for this visit.       Review of patient's allergies indicates:   Allergen Reactions    Lisinopril Swelling       Well woman:  Pap test: post hyst.  History of abnormal paps: Yes.  History of STIs:  No  Mammogram: Date of last: 8/2023.  Result: Normal  Colonoscopy: Date of last:2/2024.  Result:  normal. Repeat due:  2034.   DEXA:  Date of last: 2023.  Result:  osteoporosis--fosamax, D.  Repeat due:  per PCP.     ROS:  As per HPI.      Exam  BP (!) 141/95 (BP Location: Right arm, Patient Position: Sitting, BP Method: Medium (Manual))   Pulse 83   Ht 5' 2" (1.575 m)   Wt 58.6 kg (129 lb 3 oz)   BMI 23.63 kg/m²   General: alert and oriented, no acute distress  Respiratory: normal " respiratory effort  Abd: soft, non-tender, non-distended    Pelvic--deferred    Impression  1. Mild intermittent asthma with acute exacerbation        2. Rectal prolapse        3. Myalgia of pelvic floor        4. Pelvic floor dysfunction        5. Vaginal vault prolapse, posthysterectomy        6. Vaginal atrophy        7. Urinary incontinence, mixed          We reviewed the above issues and discussed options for short-term versus long-term management of her problems.   Plan:   Patient consented for robotic assisted aparoscopic sacrocolpopexy with synthetic mesh, possible uterosacral vs sacrospinous ligament suspension, possible anterior/posterior repair with perineorrhaphy, possible laparotomy, placement of synthetic midurethral sling vs periurethral bulking (will confirm smoking status), and cystourethroscopy.   R/B/A reviewed. Specific risks reviewed include:  infection, bleeding, need for blood transfusion, damage to surrounding structures, anesthesia risks, death, heart attack, stroke, mesh erosion/extrusion, pain, dyspareunia, urinary retention, voiding dysfunction, urinary incontinence, exacerbation of urinary urge incontinence, and need for further surgeries.  We reviewed potential for failure of POP defect repair and need for future surgery, with no way of predicting risk.  She understands success rate of ASC approaches 85%.  Success rate of midurethral sling for MARGE was reviewed as 80-85%, and she understands that this will not necessarily impact other types of urinary incontinence.  Alternatives reviewed include: pessary/PT for POP and pessary/periurethral injections/PT/medication for MARGE.    T&S, urine HCG on DOS  Preoperative appointment with PCP or cardiology: Yes -pulmonary received and scanned into media  Preop nebs  Postop OTC nebs + advair BID  VTE Prophylaxis:  heparin 5000 u SQ TID (1st dose 2hrs preop) + TEDs/SCDs  Patient instructed on Magnesium citrate and chlorahexadine/dial soap prep to  perform day before & AM of surgery.   Proceed to OR for above-mentioned procedure.    20 minutes were spent in face to face time with this patient  90 % of this time was spent in counseling and/or coordination of care      TODD Prater-BC [facility]  Division of Female Pelvic Medicine and Reconstructive Surgery  Department of Obstetrics & Gynecology

## 2024-07-18 ENCOUNTER — HOSPITAL ENCOUNTER (OUTPATIENT)
Dept: PREADMISSION TESTING | Facility: OTHER | Age: 69
Discharge: HOME | End: 2024-07-18
Attending: OBSTETRICS & GYNECOLOGY
Payer: MEDICARE

## 2024-07-18 DIAGNOSIS — Z01.818 PREOPERATIVE TESTING: Primary | ICD-10-CM

## 2024-07-18 LAB
ABO + RH BLD: NORMAL
ANION GAP SERPL CALC-SCNC: 10 MMOL/L (ref 8–16)
BLD GP AB SCN CELLS X3 SERPL QL: NORMAL
BUN SERPL-MCNC: 15 MG/DL (ref 8–23)
CALCIUM SERPL-MCNC: 10.7 MG/DL (ref 8.7–10.5)
CHLORIDE SERPL-SCNC: 104 MMOL/L (ref 95–110)
CO2 SERPL-SCNC: 24 MMOL/L (ref 23–29)
CREAT SERPL-MCNC: 0.8 MG/DL (ref 0.5–1.4)
EST. GFR  (NO RACE VARIABLE): >60 ML/MIN/1.73 M^2
GLUCOSE SERPL-MCNC: 98 MG/DL (ref 70–110)
POTASSIUM SERPL-SCNC: 3.9 MMOL/L (ref 3.5–5.1)
SODIUM SERPL-SCNC: 138 MMOL/L (ref 136–145)
SPECIMEN OUTDATE: NORMAL

## 2024-07-18 PROCEDURE — 86850 RBC ANTIBODY SCREEN: CPT | Performed by: NURSE PRACTITIONER

## 2024-07-18 PROCEDURE — 80048 BASIC METABOLIC PNL TOTAL CA: CPT | Performed by: ANESTHESIOLOGY

## 2024-07-18 RX ORDER — CYCLOBENZAPRINE HCL 10 MG
10 TABLET ORAL 3 TIMES DAILY PRN
COMMUNITY
Start: 2024-06-22

## 2024-07-18 RX ORDER — ATORVASTATIN CALCIUM 40 MG/1
40 TABLET, FILM COATED ORAL DAILY
COMMUNITY

## 2024-07-18 RX ORDER — BISACODYL 5 MG
5 TABLET, DELAYED RELEASE (ENTERIC COATED) ORAL
COMMUNITY

## 2024-07-18 RX ORDER — ACETAMINOPHEN 500 MG
1000 TABLET ORAL
OUTPATIENT
Start: 2024-07-18 | End: 2024-07-18

## 2024-07-18 RX ORDER — SERTRALINE HYDROCHLORIDE 100 MG/1
100 TABLET, FILM COATED ORAL DAILY
COMMUNITY

## 2024-07-18 RX ORDER — SODIUM CHLORIDE, SODIUM LACTATE, POTASSIUM CHLORIDE, CALCIUM CHLORIDE 600; 310; 30; 20 MG/100ML; MG/100ML; MG/100ML; MG/100ML
INJECTION, SOLUTION INTRAVENOUS CONTINUOUS
OUTPATIENT
Start: 2024-07-18

## 2024-07-18 RX ORDER — ALBUTEROL SULFATE 90 UG/1
2 AEROSOL, METERED RESPIRATORY (INHALATION) EVERY 6 HOURS PRN
COMMUNITY
Start: 2024-07-16

## 2024-07-18 RX ORDER — POLYETHYLENE GLYCOL 3350 17 G/17G
17 POWDER, FOR SOLUTION ORAL DAILY
COMMUNITY

## 2024-07-18 RX ORDER — LIDOCAINE HYDROCHLORIDE 10 MG/ML
0.5 INJECTION, SOLUTION EPIDURAL; INFILTRATION; INTRACAUDAL; PERINEURAL ONCE
OUTPATIENT
Start: 2024-07-18 | End: 2024-07-18

## 2024-07-18 NOTE — DISCHARGE INSTRUCTIONS
Information to Prepare you for your Surgery    PRE-ADMIT TESTING -  [phone]    [address]  [facility]          Your surgery has been scheduled at [facility]. We are pleased to have the opportunity to serve you. For Further Information please call [phone].    On the day of surgery please report to the Information Desk on the 1st floor.    CONTACT YOUR PHYSICIAN'S OFFICE THE DAY PRIOR TO YOUR SURGERY TO OBTAIN YOUR ARRIVAL TIME.     The evening before surgery do not eat anything after 9 p.m. ( this includes hard candy, chewing gum and mints).  You may only have GATORADE, POWERADE AND WATER  from 9 p.m. until you leave your home.   DO NOT DRINK ANY LIQUIDS ON THE WAY TO THE HOSPITAL.      Why does your anesthesiologist allow you to drink Gatorade/Powerade before surgery?  Gatorade/Powerade helps to increase your comfort before surgery and to decrease your nausea after surgery. The carbohydrates in Gatorade/Powerade help reduce your body's stress response to surgery.  If you are a diabetic-drink only water prior to surgery.    Outpatient Surgery- May allow 2 adult (18 and older) Support Persons (1 being the designated ) for all surgical/procedural patients. A breastfeeding mother will be allowed her infant and 2 adult Support Persons. No one under the age of 18 will be allowed in the building. No swapping out of visitors in the [facility].      SPECIAL MEDICATION INSTRUCTIONS: TAKE medications checked off by the Anesthesiologist on your Medication List.    Angiogram Patients: Take medications as instructed by your physician, including aspirin.     Surgery Patients:    If you take ASPIRIN - Your PHYSICIAN/SURGEON will need to inform you IF/OR when you need to stop taking aspirin prior to your surgery.     The week prior to surgery do not ot take any medications containing IBUPROFEN or NSAIDS ( Advil, Motrin, Goodys, BC, Aleve, Naproxen etc)  If you are not sure if you should take a medicine please call your surgeon's office.  Ok to take Tylenol    Do Not Wear any make-up (especially eye make-up) to surgery. Please remove any false eyelashes or eyelash extensions. If you arrive the day of surgery with makeup/eyelashes on you will be required to remove prior to surgery. (There is a risk of corneal abrasions if eye makeup/eyelash extensions are not removed)      Leave all valuables at home.   Do Not wear any jewelry or watches, including any metal in body piercings. Jewelry must be removed prior to coming to the hospital.  There is a possibility that rings that are unable to be removed may be cut off if they are on the surgical extremity.    Please remove all hair extensions, wigs, clips and any other metal accessories/ ornaments from your hair.  These items may pose a flammable/fire risk in Surgery and must be removed.    Do not shave your surgical area at least 5 days prior to your surgery. The surgical prep will be performed at the hospital according to Infection Control regulations.    Contact Lens must be removed before surgery. Either do not wear the contact lens or bring a case and solution for storage.  Please bring a container for eyeglasses or dentures as required.  Bring any paperwork your physician has provided, such as consent forms,  history and physicals, doctor's orders, etc.   Bring comfortable clothes that are loose fitting to wear upon discharge. Take into consideration the type of surgery being performed.  Maintain your diet as advised per your physician the day prior to surgery.      Adequate rest the night before surgery is advised.   Park in the Parking lot behind the hospital or in the [facility] across the street from the parking lot. Parking is complimentary.  If you will be discharged the same day as your procedure, please arrange for a responsible adult to drive you home or to accompany you if traveling by taxi.    YOU WILL NOT BE PERMITTED TO DRIVE OR TO LEAVE THE HOSPITAL ALONE AFTER SURGERY.   If you are being discharged the same day, it is strongly recommended that you arrange for someone to remain with you for the first 24 hrs following your surgery.    The Surgeon will speak to your family/visitor after your surgery regarding the outcome of your surgery and post op care.  The Surgeon may speak to you after your surgery, but there is a possibility you may not remember the details.  Please check with your family members regarding the conversation with the Surgeon.    We strongly recommend whoever is bringing you home be present for discharge instructions.  This will ensure a thorough understanding for your post op home care.          Thank you for your cooperation.  The Staff of [facility].            Bathing Instructions with Hibiclens    Shower the evening before and morning of your procedure with Chlorhexidine (Hibiclens)  do not use Chlorhexidine on your face or genitals. Do not get in your eyes.  Wash your face with water and your regular face wash/soap  Use your regular shampoo  Apply Chlorhexidine (Hibiclens) directly on your skin or on a wet washcloth and wash gently. When showering: Move away from the shower stream when applying Chlorhexidine (Hibiclens) to avoid rinsing off too soon.  Rinse thoroughly with warm water  Do not dilute Chlorhexidine (Hibiclens)   Dry off as usual, do not use any deodorant, powder, body lotions, perfume, after shave or cologne.

## 2024-07-18 NOTE — ANESTHESIA PREPROCEDURE EVALUATION
07/18/2024  Kate Taylor is a 69 y.o., female.      Pre-op Assessment    I have reviewed the Patient Summary Reports.     I have reviewed the Nursing Notes. I have reviewed the NPO Status.   I have reviewed the Medications.     Review of Systems  Anesthesia Hx:  No problems with previous Anesthesia             Denies Family Hx of Anesthesia complications.    Denies Personal Hx of Anesthesia complications.                    Social:  Former Smoker       Hematology/Oncology:  Hematology Normal   Oncology Normal                                   EENT/Dental:  EENT/Dental Normal           Cardiovascular:     Hypertension                                        Pulmonary:    Asthma     sarcoid               Renal/:  Renal/ Normal                 Hepatic/GI:  Hepatic/GI Normal                 Musculoskeletal:  Arthritis          Spine Disorders: lumbar            Neurological:  Neurology Normal                                      Endocrine:  Endocrine Normal            Dermatological:  Skin Normal    Psych:    depression                Physical Exam  General: Well nourished and Alert    Airway:  Mallampati: II   Mouth Opening: Normal  Tongue: Normal    Dental:  Intact        Anesthesia Plan  Type of Anesthesia, risks & benefits discussed:    Anesthesia Type: Gen ETT  Intra-op Monitoring Plan: Standard ASA Monitors  Post Op Pain Control Plan: multimodal analgesia  Induction:  IV  Airway Plan: Video  Informed Consent: Informed consent signed with the Patient and all parties understand the risks and agree with anesthesia plan.  All questions answered.   ASA Score: 3  Anesthesia Plan Notes: Labs/EKG in Epic  Pending BMP/T&S    Ready For Surgery From Anesthesia Perspective.     .

## 2024-07-19 ENCOUNTER — TELEPHONE (OUTPATIENT)
Dept: PAIN MEDICINE | Facility: CLINIC | Age: 69
End: 2024-07-19
Payer: MEDICARE

## 2024-07-21 NOTE — PROCEDURES
TITLE OF OPERATION:  Complex cystometry.  Complex uroflowmetry.  Electromyography with surface electrodes.  Pressure voiding flow study.  Abdominal pressure measurement.  Leak point pressure measurement.    INDICATIONS:  Last HPI from 02/19/2024  1)  UI:  (+) MARGE > (+) UUI (if holds too long) X years. (+) pads:2/day, usually minimum wetness and 1/night usually minimum wetness unless coughs/sneezes.  Daytime frequency: Q 2 hours.  Nocturia: Yes: 1/night.   (--) dysuria,  (--) hematuria,  (--) frequent UTIs.  (+) complete bladder emptying.      2)  POP:  Present. above introitus. Worsening.  Worse when on feet or after diarrhea.  Symptoms:(+)  pressure, discomfort.  (--) vaginal bleeding. (--) vaginal discharge. (--) sexually active.  (+) dyspareunia--last 10 years ago, ? Due to POP.   (--)  Vaginal dryness.  (--) vaginal estrogen use.   --POP-Q:  Aa -2; Ba -2; C -8; Ap -2; Bp -2.  Genital hiatus 2, perineal body 2. total vaginal length 9. (Standing)  Pvr 20 mL     3)  BM:  (+) constipation/straining. Intermittent with  (+) chronic diarrhea. Takes miralax PRN for constipation.  Not taking recommended fiber.  (--) hematochezia.  (+) fecal incontinence--mostly with diarrhea.  (+) fecal smearing/urgency.  (--) complete evacuation.  +rectal prolapse: Has photos of prolapse at home which show cystocele and full thickness rectal prolapse.      Changes from last visit:  1)  L rotator cuff tear:  --saw ortho--had injection  --Scheduled to follow up     2)  Rectal prolapse:  --surgery per Dr. Snyder     3)  ?pelvic organ prolapse:  --no major on exam on initial exam  --has seen bulge in past (identified area with mirror--seems to be at introitus)--showed phone photo--scanned into media:  --mri defacography: 05/01/2024  1.    Anatomic findings: Post hysterectomy.  Normal levator hiatus and anorectal junction.   2.    Anterior compartment findings: Mild cystocele.   3.    Middle compartment findings: Mild vaginal prolapse.   4.     Posterior compartment findings: Mild rectocele.     4)  Vaginal atrophy (dryness):    --did not start vaginal estrogen cream     5)  Straining with stools (intermittent C/D) + fecal incontinence:  --reports fecal incontinence  --taking fiber supplement  --rectal prolapse worse with asthma exacerbation     6)  Mixed urinary incontinence, urge < stress:    --went to pelvic floor PT x 2 sessions  --+MARGE/ UUI    PREOPERATIVE DIAGNOSIS:  1)  Vaginal vault prolapse  2)  Rectal prolapse  3)  Vaginal atrophy  4)  Straining with stools  5)  Irregular bowel habits  6)  Fecal incontinence  7)  Mixed urinary incontinence    POSTOPERATIVE DIAGNOSIS:  1)  Vaginal vault prolapse  2)  Rectal prolapse  3)  Vaginal atrophy  4)  Straining with stools  5)  Irregular bowel habits  6)  Fecal incontinence  7)  Mixed urinary incontinence  8)  Concern for voiding dysfunction (Valsalva assist)  9)  Urodynamic stress incontinence    ANESTHESIA:  None.    SPECIMEN (BACTERIOLOGICAL, PATHOLOGICAL OR OTHER):  None.    PROSTHETIC DEVICE/IMPLANT:  None.    SURGEONS NARRATIVE:  A time out was performed in which the patient identity and procedure were confirmed.  Urodynamic evaluation was performed using a computerized system ([employer], [employer]  Uroflowmetry was performed on the patient in the sitting position without catheters in place.  Subsequent urodynamic testing was performed with the patient in the lithotomy position at 45 degrees. Air charged catheters were used with sterile water as the infusion medium. Vesical and abdominal (rectal) pressures were measured, and detrusor pressure was calculated. EMG activity was recorded with surface electrodes. During filling, room temperature sterile water was infused at a rate of 30 cubic centimeters per minute. The patient was asked cough after instillation of each 100cc volume. Two Valsalva leak point pressures and two cough leak point pressures were performed with the catheters in place at  300 cubic centimeters and again at maximum capacity. Valsalva leak point pressure was defined as the difference between vesical pressure at which leakage was noted (visualized at the external urethral meatus) and the baseline vesical pressure. Following urodynamic testing, a pressure flow study was performed with the patient in the sitting position. Vesical and abdominal pressures were monitored and detrusor pressures were calculated. After the pressure flow study, the catheters were then removed. The patient tolerated the procedure well.     Urine dipstick: neg.    1.  VOIDING PHASE:      a.  Uroflowmetry:  Prolapse reduction: No  Voided volume:  19 mL   Voiding time:   13 seconds  Max flow:  4 mL/s  Avg flow:   2 mL/s   PVR:   0 mL    The overall configuration of this uroflow study was  with insufficient volume for interpretation.      b.  Pressure flow:  Prolapse reduction: No  Voided volume:   116 mL  Voiding time:   64 seconds  Peak flow:  3 mL/s   Avg flow:  1 mL/s  Max det pressure:  31  cm H20  Det pressure at max flow: 17 cm H20  Void initiated by detrusor contraction interrupted by Valsalva.    Urethral relaxation (EMG):  present.    PVR (calculated):  213 mL.  After removal of catheters, the patient was able to void 250 mL in hat on toilet.    The overall configuration of this pressure flow study was with concern for voiding dysfunction (Valsalva assist).      2.  FILLING PHASE:  1st desire: 52 mL  Normal desire:  192 mL  Strong desire: 275 mL  Urgency:  332 mL  Compliance (calculated) approx 300 mL/cm H20  EMG activity during filling: unchanged  Detrusor contractions observed: No.     3.  URETHRAL FUNCTION/STORAGE PHASE:    a.  WITH prolapse reduction:  CLPP (153 mL): Positive  at  126 cm H20  VLPP (153 mL): Positive  at  84 cm H20   CLPP (300 mL): Positive  at  102 cm H20  VLPP (300 mL): Positive  at  119 cm H20     These findings are consistent with Positive urodynamic stress  incontinence.    Assessment:  UF with insufficient volume for interpretation.   PF with concern for voiding dysfunction (Valsalva assist).  Compliance normal.  Max capacity 332 mL.  DO (--).  MARGE (+).      Plan:  1)  L rotator cuff tear:  --managed by ortho     2)  Rectal prolapse:  --surgery per Dr. Snyder  --at same time as UG surgery     3)  ?pelvic organ prolapse:  --no major on exam on initial exam  --mri defacography:  1.    Anatomic findings: Post hysterectomy.  Normal levator hiatus and anorectal junction.   2.    Anterior compartment findings: Mild cystocele.   3.    Middle compartment findings: Mild vaginal prolapse.   4.    Posterior compartment findings: Mild rectocele.  --has seen bulge in past (identified area with mirror--seems to be at introitus)--showed phone photo--scanned into media:            --do some heavier lifting/work day before procedure to see if bulge will come out more  --if repairing rectal prolapse, need to make sure we don't miss vaginal prolapse because can become worse              --consider laparoscopic sacral colpopexy vs uterosacral suspension/anterior/posterior repair              --if surgery: bladder testing to see if need sling for stress leakage              --if surgery: follow up with ortho regarding rotator cuff repair              --if surgery: clearance per PCP (Wallace) + labs (CBC, CMP, T&S)/EKG/CXR     4)  Vaginal atrophy (dryness):    --continue Vaginal estrogen:  --Use 0.5 grams of estrogen cream in vagina with applicator or dime-sized amount with finger (as far as can reach internally) nightly x 2 weeks, then twice a week thereafter.  You can also apply a dime-sized amount with your finger around the vaginal opening and inner lips at same frequency.                           --Vaginal estrogen may help to decrease pain related to dryness with intercourse and urinary symptoms (urgency/frequency/UTIs) around menopause.      5)  Straining with stools (intermittent C/D) +  fecal incontinence:  --hydrate well  --continue fiber supplement  --can use miralax for rescue  --levator mylagia B +  --need MR irvin     6)  Mixed urinary incontinence, urge < stress:    --Empty bladder every 3 hours.  Empty well: wait a minute, lean forward on toilet.    --Avoid dietary irritants (see sheet).  Keep diary x 3-5 days to determine your irritants.  --return to pelvic floor PT   --URGE: trial gemtesa 75 mg daily-- let me know if it is not affordable. Takes 2-4 weeks to see if will have effect.  For dry mouth: get sour, sugar free lozenge or gum.    --STRESS:  Pessary vs. Sling.      7) asthma exacerbation  --follow up with pcp  --NEED TO HAVE COUGH CONTROLLED PRIOR TO SURGERY   --seems better now  --has appt with pulm at [facility]--need to get notes to make sure ok to proceed with OR     8) Please see preop note from 7-15 for full detail  ---------------------------------------    Title of Operation:   Cystourethroscopy.     INDICATIONS:  As above    PREOPERATIVE DIAGNOSIS  As above    POSTOPERATIVE DIAGNOSIS:   As above    Anesthesia:   2% Xylocaine gel.    Specimen (Bacteriological, Pathological or other):   None.     Prosthetic Device/Implant:   None.     Surgeons Narrative:     After informed consent was obtained, the patient was placed in the lithotomy position. The urethral meatus was prepped with Betadine and 10 cubic centimeters of 2% Xylocaine gel were introduced into the urethra. A flexible cystourethroscope was introduced into the bladder. The bladder was distended with approximately 300 cubic centimeters of sterile water. A systematic survey was performed in which the bladder was surveyed using multiple sequential passes in a clockwise fashion from the bladder dome to the bladder base to the urethrovesical junction. The trigone and ureteral orifices were observed. The scope was then flipped back on itself, and the urethrovesical junction was viewed. A vaginal examining finger was then placed  with pressure suburethrally at the urethrovesical junction as the telescope was withdrawn in order to perform positive pressure urethroscopy.  Standard maneuvers of cough, squeeze and Valsalva were performed. The telescope was then completely withdrawn.     Findings: Urethroscopy:  Normal.  Cystoscopy:  Normal bladder mucosa, bilateral ureteral flow was noted.     Assessment: Essentially normal cystourethroscopy.      Plan: The patient will follow up with Dr. Rausch as scheduled.  See urodynamics note from 7- for further plan details.

## 2024-07-22 ENCOUNTER — TELEPHONE (OUTPATIENT)
Dept: UROGYNECOLOGY | Facility: CLINIC | Age: 69
End: 2024-07-22
Payer: MEDICARE

## 2024-07-22 NOTE — TELEPHONE ENCOUNTER
Called Dr. Maciel's office again to request clearance faxed to our office as surgery is in 3 days. They said they will speak with their manager and I gave the fax number.

## 2024-07-22 NOTE — TELEPHONE ENCOUNTER
Attempted to contact Dr. Maciel office. Person on phone stating they are out of office but will return tomorrow at 8AM and return call.     ----- Message from Mihir Rausch MD sent at 7/20/2024  2:42 PM CDT -----  Regarding: FW: Can you please help patient make appt with pulmonary sooner  Teresa Fernandez, and covering urogyn staff:   This patient looks like she was seen by pulmonary at [facility] last week on 7/17. We need to get a copy of the note to make sure she's ok to proceed with surgery and if there are any specific perioperative considerations or treatments she would need. Looks like she saw the below MD, but I can't read the note.  Can you please call the MD's office on Monday, confirm that they think she's ok to proceed with surgery or not, and get a copy of the pulmonary clearance/scan into her chart media section.  Please let me know what happens. Her surgery is 7-25.  Thanks!    Pulmonary MD information:  Oseas Maciel MD   [address] [facility] [place] [address]   Phone: [phone]   Fax: [phone]  ----- Message -----  From: Poppy Rebolledo  Sent: 7/3/2024   8:37 AM CDT  To: Mihir Rausch MD; Clementina Snyder MD; #  Subject: RE: Can you please help patient make appt wi#    Good morning,  Talked with Ms. Love this morning and she said that she has the appointment scheduled for July 16th at  with one of Dr. Santamaria associates.  Thanks!  ----- Message -----  From: Mihir Rausch MD  Sent: 7/2/2024   9:16 PM CDT  To: Clementina Snyder MD; Poppy Rebolledo; #  Subject: RE: Can you please help patient make appt wi#    Great! Can you please call patient tomorrow and confirm that she had appt made, then let us know? Thanks!  ----- Message -----  From: Poppy Rebolledo  Sent: 7/2/2024   1:13 PM CDT  To: Mihir Rausch MD  Subject: RE: Can you please help patient make appt wi#    Hey,  Just received a call from Dr. Santamaria office and he is out of town but they said they will get her  scheduled to see another pulmonary doctor on the 16th of this month.  Thanks!  ----- Message -----  From: Mihir Rausch MD  Sent: 7/1/2024  12:00 PM CDT  To: Kimmy Boss NP; Clementina Snyder MD; #  Subject: Can you please help patient make appt with pJose Alfredo Mcwilliams! Can you please help patient make appt with pulmonary in next 1-2 weeks? She has surgery with Dr. Snyder and myself 7-25, and they pulm appt she has currently isn't until 9-2024. She was having an asthma exacerbation with her sarcoid, and we just need that controlled before any surgery.  I was trying to book, but it keeps saying: The patient's insurance has no benefits for services at this location. The patient will have to pay out of pocket for this visit. The patient can contact their insurance carrier to locate providers in their network. By proceeding I acknowledge that I have shared this information with the patient.    So, can we figure out what's going on? [facility], but it still gives me that statement if I try to book earlier at [place].     Please let me know what happens--thanks!  ----- Message -----  From: Clementina Snyder MD  Sent: 6/28/2024   2:11 PM CDT  To: Kimmy Boss NP; Mihir Rausch MD; #    FYI I put in an urgent referral to pulmonology as Ms. Taylor was in the ED 6/19 for another asthma exacerbation.  Patient had significant coughing with resulting prolapse.  She just re-established with a new primary NP at [facility] and has not had any optimization of her asthma.  We will follow up and try to get her seen ASAP.     Clementina

## 2024-07-22 NOTE — TELEPHONE ENCOUNTER
Called Dr. Maciel's office, but he is out of the office until next Monday 7/29. I will contact medical records to see if we can get pulmonary scan.          ----- Message from Mihir Rausch MD sent at 7/20/2024  2:42 PM CDT -----  Regarding: FW: Can you please help patient make appt with pulmonary sooner  Teresa Fernandez, and covering urogyn staff:   This patient looks like she was seen by pulmonary at [facility] last week on 7/17. We need to get a copy of the note to make sure she's ok to proceed with surgery and if there are any specific perioperative considerations or treatments she would need. Looks like she saw the below MD, but I can't read the note.  Can you please call the MD's office on Monday, confirm that they think she's ok to proceed with surgery or not, and get a copy of the pulmonary clearance/scan into her chart media section.  Please let me know what happens. Her surgery is 7-25.  Thanks!    Pulmonary MD information:  Oseas Maciel MD   [address] [place] [address] [facility] [place] [address]   Phone: [phone]   Fax: [phone]  ----- Message -----  From: Poppy Rebolledo  Sent: 7/3/2024   8:37 AM CDT  To: Mihir Rausch MD; Clementina Snyder MD; #  Subject: RE: Can you please help patient make appt wi#    Good morning,  Talked with Ms. Love this morning and she said that she has the appointment scheduled for July 16th at  with one of Dr. Hina medel.  Thanks!  ----- Message -----  From: Mihir Rausch MD  Sent: 7/2/2024   9:16 PM CDT  To: Clementina Snyder MD; Poppy Rebolledo; #  Subject: RE: Can you please help patient make appt wi#    Great! Can you please call patient tomorrow and confirm that she had appt made, then let us know? Thanks!  ----- Message -----  From: Poppy Rebolledo  Sent: 7/2/2024   1:13 PM CDT  To: Mihir Rausch MD  Subject: RE: Can you please help patient make appt wi#    Hey,  Just received a call from Dr. Santamaria office and he is out of town but they said  they will get her scheduled to see another pulmonary doctor on the 16th of this month.  Thanks!  ----- Message -----  From: Mihir Rausch MD  Sent: 7/1/2024  12:00 PM CDT  To: Kimmy Boss NP; Clementina Snyder MD; #  Subject: Can you please help patient make appt with pJose Alfredo Mcwilliams! Can you please help patient make appt with pulmonary in next 1-2 weeks? She has surgery with Dr. Snyder and myself 7-25, and they pulm appt she has currently isn't until 9-2024. She was having an asthma exacerbation with her sarcoid, and we just need that controlled before any surgery.  I was trying to book, but it keeps saying: The patient's insurance has no benefits for services at this location. The patient will have to pay out of pocket for this visit. The patient can contact their insurance carrier to locate providers in their network. By proceeding I acknowledge that I have shared this information with the patient.    So, can we figure out what's going on? Her 9-2024 appt is [facility], but it still gives me that statement if I try to book earlier at [place].     Please let me know what happens--thanks!  ----- Message -----  From: Clementina nSyder MD  Sent: 6/28/2024   2:11 PM CDT  To: Kimmy Boss NP; Mihir Rausch MD; #    FYI I put in an urgent referral to pulmonology as Ms. Taylor was in the ED 6/19 for another asthma exacerbation.  Patient had significant coughing with resulting prolapse.  She just re-established with a new primary NP at [facility] and has not had any optimization of her asthma.  We will follow up and try to get her seen ASAP.     Clementina

## 2024-07-22 NOTE — TELEPHONE ENCOUNTER
Called medical records as directed and they said I need to fax a medical records request over.      ----- Message from Mihir Rausch MD sent at 7/20/2024  2:42 PM CDT -----  Regarding: FW: Can you please help patient make appt with pulmonary sooner  Rebecca, Anna, and covering urogyn staff:   This patient looks like she was seen by pulmonary at [facility] last week on 7/17. We need to get a copy of the note to make sure she's ok to proceed with surgery and if there are any specific perioperative considerations or treatments she would need. Looks like she saw the below MD, but I can't read the note.  Can you please call the MD's office on Monday, confirm that they think she's ok to proceed with surgery or not, and get a copy of the pulmonary clearance/scan into her chart media section.  Please let me know what happens. Her surgery is 7-25.  Thanks!    Pulmonary MD information:  Oseas Maciel MD   [address] [facility] [place] [address]   Phone: [phone]   Fax: [phone]  ----- Message -----  From: Poppy Rebolledo  Sent: 7/3/2024   8:37 AM CDT  To: Mihir Rausch MD; Clementian Snyder MD; #  Subject: RE: Can you please help patient make appt wi#    Good morning,  Talked with Ms. Love this morning and she said that she has the appointment scheduled for July 16th at  with one of Dr. Hina medel.  Thanks!  ----- Message -----  From: Mihir Rausch MD  Sent: 7/2/2024   9:16 PM CDT  To: Clementina Snyder MD; Poppy Rebolledo; #  Subject: RE: Can you please help patient make appt wi#    Great! Can you please call patient tomorrow and confirm that she had appt made, then let us know? Thanks!  ----- Message -----  From: Poppy Rebolledo  Sent: 7/2/2024   1:13 PM CDT  To: Mihir Rausch MD  Subject: RE: Can you please help patient make appt wi#    Hey,  Just received a call from Dr. Santamaria office and he is out of town but they said they will get her scheduled to see another pulmonary  doctor on the 16th of this month.  Thanks!  ----- Message -----  From: Mihir Rausch MD  Sent: 7/1/2024  12:00 PM CDT  To: Kimmy Boss NP; Clementina Snyder MD; #  Subject: Can you please help patient make appt with pJose Alfredo Mcwilliams! Can you please help patient make appt with pulmonary in next 1-2 weeks? She has surgery with Dr. Snyder and myself 7-25, and they pulm appt she has currently isn't until 9-2024. She was having an asthma exacerbation with her sarcoid, and we just need that controlled before any surgery.  I was trying to book, but it keeps saying: The patient's insurance has no benefits for services at this location. The patient will have to pay out of pocket for this visit. The patient can contact their insurance carrier to locate providers in their network. By proceeding I acknowledge that I have shared this information with the patient.    So, can we figure out what's going on? Her 9-2024 appt is at [facility], but it still gives me that statement if I try to book earlier at [place].     Please let me know what happens--thanks!  ----- Message -----  From: Clementina Snyder MD  Sent: 6/28/2024   2:11 PM CDT  To: Kimmy Boss NP; Mihir Rausch MD; #    FYI I put in an urgent referral to pulmonology as Ms. Taylor was in the ED 6/19 for another asthma exacerbation.  Patient had significant coughing with resulting prolapse.  She just re-established with a new primary NP at [facility] and has not had any optimization of her asthma.  We will follow up and try to get her seen ASAP.     Clementina

## 2024-07-23 ENCOUNTER — TELEPHONE (OUTPATIENT)
Dept: UROGYNECOLOGY | Facility: CLINIC | Age: 69
End: 2024-07-23
Payer: MEDICARE

## 2024-07-23 NOTE — TELEPHONE ENCOUNTER
Spoke to  for pulmonary clearance for Mrs. Kate Taylor.  Dr. Maciel stated that he could not give a sugery clearance for patient because pulmonologist do not give clearances.  He stated that she is able to have surgery with little risk. He will ask his office to fax information to [phone] and e-mail to [email or web address].  Call ended.

## 2024-07-23 NOTE — TELEPHONE ENCOUNTER
Called pt and reminded them of their surgery on 7/25 at 7am. Instructed pt to arrive 2 hours prior, at 5am. Pt verbalized understanding, call ended.

## 2024-07-23 NOTE — TELEPHONE ENCOUNTER
Spoke to patient in reference to pulmonary clearance.  The patient was given our fax number ([phone]).  Patient will contact Dr. Maciel's office to have them fax over her pulmonary clearance. Call ended.

## 2024-07-23 NOTE — TELEPHONE ENCOUNTER
Returned patients phone call - patient instructed us to call Dr. Maciel and ask for [facility] to get surgery clearance.     Contacted [facility] and provided fax number. [facility] stated is going to fax everything over now.

## 2024-07-23 NOTE — TELEPHONE ENCOUNTER
----- Message from Svetlana Whitney sent at 7/23/2024  3:25 PM CDT -----      Name of Who is Calling: DINAH LEONG [[id number]]      What is the request in detail: Pt returned call to the office.Please contact to further discuss and advise.          Can the clinic reply by [facility]: Y      [facility]: [phone]

## 2024-07-25 ENCOUNTER — HOSPITAL ENCOUNTER (INPATIENT)
Facility: OTHER | Age: 69
LOS: 1 days | Discharge: HOME | DRG: 671 | End: 2024-07-26
Attending: OBSTETRICS & GYNECOLOGY | Admitting: OBSTETRICS & GYNECOLOGY
Payer: MEDICARE

## 2024-07-25 ENCOUNTER — ANESTHESIA (OUTPATIENT)
Dept: SURGERY | Facility: OTHER | Age: 69
End: 2024-07-25
Payer: MEDICARE

## 2024-07-25 DIAGNOSIS — N99.3 VAGINAL VAULT PROLAPSE, POSTHYSTERECTOMY: ICD-10-CM

## 2024-07-25 DIAGNOSIS — K62.3 RECTAL PROLAPSE: ICD-10-CM

## 2024-07-25 DIAGNOSIS — N39.46 URINARY INCONTINENCE, MIXED: ICD-10-CM

## 2024-07-25 DIAGNOSIS — Z98.890 STATUS POST SACROCOLPOPEXY: Primary | ICD-10-CM

## 2024-07-25 PROCEDURE — 27201423 OPTIME MED/SURG SUP & DEVICES STERILE SUPPLY: Performed by: OBSTETRICS & GYNECOLOGY

## 2024-07-25 PROCEDURE — 94640 AIRWAY INHALATION TREATMENT: CPT

## 2024-07-25 PROCEDURE — 57425 LAPAROSCOPY SURG COLPOPEXY: CPT | Mod: AS,,, | Performed by: PHYSICIAN ASSISTANT

## 2024-07-25 PROCEDURE — 25000003 PHARM REV CODE 250: Performed by: STUDENT IN AN ORGANIZED HEALTH CARE EDUCATION/TRAINING PROGRAM

## 2024-07-25 PROCEDURE — 8E0W4CZ ROBOTIC ASSISTED PROCEDURE OF TRUNK REGION, PERCUTANEOUS ENDOSCOPIC APPROACH: ICD-10-PCS | Performed by: OBSTETRICS & GYNECOLOGY

## 2024-07-25 PROCEDURE — 25000003 PHARM REV CODE 250

## 2024-07-25 PROCEDURE — 51715 ENDOSCOPIC INJECTION/IMPLANT: CPT | Mod: 51,,, | Performed by: OBSTETRICS & GYNECOLOGY

## 2024-07-25 PROCEDURE — L8606 SYNTHETIC IMPLNT URINARY 1ML: HCPCS | Performed by: OBSTETRICS & GYNECOLOGY

## 2024-07-25 PROCEDURE — 0UUG4KZ SUPPLEMENT VAGINA WITH NONAUTOLOGOUS TISSUE SUBSTITUTE, PERCUTANEOUS ENDOSCOPIC APPROACH: ICD-10-PCS | Performed by: OBSTETRICS & GYNECOLOGY

## 2024-07-25 PROCEDURE — 94799 UNLISTED PULMONARY SVC/PX: CPT

## 2024-07-25 PROCEDURE — 63600175 PHARM REV CODE 636 W HCPCS: Performed by: OBSTETRICS & GYNECOLOGY

## 2024-07-25 PROCEDURE — 63600175 PHARM REV CODE 636 W HCPCS: Performed by: STUDENT IN AN ORGANIZED HEALTH CARE EDUCATION/TRAINING PROGRAM

## 2024-07-25 PROCEDURE — 11000001 HC ACUTE MED/SURG PRIVATE ROOM

## 2024-07-25 PROCEDURE — 25000003 PHARM REV CODE 250: Performed by: ANESTHESIOLOGY

## 2024-07-25 PROCEDURE — 63600175 PHARM REV CODE 636 W HCPCS: Mod: JZ,JG | Performed by: NURSE ANESTHETIST, CERTIFIED REGISTERED

## 2024-07-25 PROCEDURE — 36000712 HC OR TIME LEV V 1ST 15 MIN: Performed by: OBSTETRICS & GYNECOLOGY

## 2024-07-25 PROCEDURE — P9045 ALBUMIN (HUMAN), 5%, 250 ML: HCPCS | Mod: JZ,JG | Performed by: NURSE ANESTHETIST, CERTIFIED REGISTERED

## 2024-07-25 PROCEDURE — 37000008 HC ANESTHESIA 1ST 15 MINUTES: Performed by: OBSTETRICS & GYNECOLOGY

## 2024-07-25 PROCEDURE — 94761 N-INVAS EAR/PLS OXIMETRY MLT: CPT

## 2024-07-25 PROCEDURE — 63600175 PHARM REV CODE 636 W HCPCS

## 2024-07-25 PROCEDURE — 63600175 PHARM REV CODE 636 W HCPCS: Performed by: ANESTHESIOLOGY

## 2024-07-25 PROCEDURE — 57425 LAPAROSCOPY SURG COLPOPEXY: CPT | Mod: ,,, | Performed by: OBSTETRICS & GYNECOLOGY

## 2024-07-25 PROCEDURE — 0TVD8ZZ RESTRICTION OF URETHRA, VIA NATURAL OR ARTIFICIAL OPENING ENDOSCOPIC: ICD-10-PCS | Performed by: OBSTETRICS & GYNECOLOGY

## 2024-07-25 PROCEDURE — 71000033 HC RECOVERY, INTIAL HOUR: Performed by: OBSTETRICS & GYNECOLOGY

## 2024-07-25 PROCEDURE — 63600175 PHARM REV CODE 636 W HCPCS: Mod: JZ,JG | Performed by: OBSTETRICS & GYNECOLOGY

## 2024-07-25 PROCEDURE — 37000009 HC ANESTHESIA EA ADD 15 MINS: Performed by: OBSTETRICS & GYNECOLOGY

## 2024-07-25 PROCEDURE — 25000242 PHARM REV CODE 250 ALT 637 W/ HCPCS: Performed by: OBSTETRICS & GYNECOLOGY

## 2024-07-25 PROCEDURE — 25000003 PHARM REV CODE 250: Performed by: NURSE ANESTHETIST, CERTIFIED REGISTERED

## 2024-07-25 PROCEDURE — 71000039 HC RECOVERY, EACH ADD'L HOUR: Performed by: OBSTETRICS & GYNECOLOGY

## 2024-07-25 PROCEDURE — 36000713 HC OR TIME LEV V EA ADD 15 MIN: Performed by: OBSTETRICS & GYNECOLOGY

## 2024-07-25 PROCEDURE — 25000003 PHARM REV CODE 250: Performed by: OBSTETRICS & GYNECOLOGY

## 2024-07-25 PROCEDURE — 0DJD8ZZ INSPECTION OF LOWER INTESTINAL TRACT, VIA NATURAL OR ARTIFICIAL OPENING ENDOSCOPIC: ICD-10-PCS | Performed by: SURGERY

## 2024-07-25 PROCEDURE — 25000242 PHARM REV CODE 250 ALT 637 W/ HCPCS

## 2024-07-25 PROCEDURE — S4991 NICOTINE PATCH NONLEGEND: HCPCS

## 2024-07-25 PROCEDURE — 0DSP8ZZ REPOSITION RECTUM, VIA NATURAL OR ARTIFICIAL OPENING ENDOSCOPIC: ICD-10-PCS | Performed by: SURGERY

## 2024-07-25 PROCEDURE — 15275 SKIN SUB GRAFT FACE/NK/HF/G: CPT | Mod: ,,, | Performed by: OBSTETRICS & GYNECOLOGY

## 2024-07-25 PROCEDURE — C1781 MESH (IMPLANTABLE): HCPCS | Performed by: OBSTETRICS & GYNECOLOGY

## 2024-07-25 PROCEDURE — C1765 ADHESION BARRIER: HCPCS | Performed by: OBSTETRICS & GYNECOLOGY

## 2024-07-25 DEVICE — MESH VAGINAL PROLAPSE 10X15CM: Type: IMPLANTABLE DEVICE | Site: URETHRA | Status: FUNCTIONAL

## 2024-07-25 DEVICE — SYS BULKAMID URETH BULKING 1ML: Type: IMPLANTABLE DEVICE | Site: URETHRA | Status: FUNCTIONAL

## 2024-07-25 DEVICE — BARRIER INTERCEED 3X4 ST DIS: Type: IMPLANTABLE DEVICE | Site: PELVIS | Status: FUNCTIONAL

## 2024-07-25 DEVICE — MESH SURG MATRIX PSMS 5X5CM: Type: IMPLANTABLE DEVICE | Site: PELVIS | Status: FUNCTIONAL

## 2024-07-25 RX ORDER — IBUPROFEN 600 MG/1
600 TABLET ORAL EVERY 6 HOURS
Status: DISCONTINUED | OUTPATIENT
Start: 2024-07-26 | End: 2024-07-26 | Stop reason: HOSPADM

## 2024-07-25 RX ORDER — PROCHLORPERAZINE EDISYLATE 5 MG/ML
5 INJECTION INTRAMUSCULAR; INTRAVENOUS EVERY 6 HOURS PRN
Status: DISCONTINUED | OUTPATIENT
Start: 2024-07-25 | End: 2024-07-26 | Stop reason: HOSPADM

## 2024-07-25 RX ORDER — ALBUMIN HUMAN 50 G/1000ML
SOLUTION INTRAVENOUS
Status: DISCONTINUED | OUTPATIENT
Start: 2024-07-25 | End: 2024-07-25

## 2024-07-25 RX ORDER — IPRATROPIUM BROMIDE AND ALBUTEROL SULFATE 2.5; .5 MG/3ML; MG/3ML
3 SOLUTION RESPIRATORY (INHALATION)
Status: DISCONTINUED | OUTPATIENT
Start: 2024-07-25 | End: 2024-07-26 | Stop reason: HOSPADM

## 2024-07-25 RX ORDER — LEVALBUTEROL INHALATION SOLUTION 0.63 MG/3ML
0.63 SOLUTION RESPIRATORY (INHALATION)
Status: DISCONTINUED | OUTPATIENT
Start: 2024-07-25 | End: 2024-07-25

## 2024-07-25 RX ORDER — MEPERIDINE HYDROCHLORIDE 25 MG/ML
12.5 INJECTION INTRAMUSCULAR; INTRAVENOUS; SUBCUTANEOUS ONCE AS NEEDED
Status: DISCONTINUED | OUTPATIENT
Start: 2024-07-25 | End: 2024-07-25

## 2024-07-25 RX ORDER — SODIUM CHLORIDE 0.9 % (FLUSH) 0.9 %
3 SYRINGE (ML) INJECTION
Status: DISCONTINUED | OUTPATIENT
Start: 2024-07-25 | End: 2024-07-25

## 2024-07-25 RX ORDER — MUPIROCIN 20 MG/G
OINTMENT TOPICAL 2 TIMES DAILY
Status: DISCONTINUED | OUTPATIENT
Start: 2024-07-25 | End: 2024-07-26 | Stop reason: HOSPADM

## 2024-07-25 RX ORDER — MIDAZOLAM HYDROCHLORIDE 1 MG/ML
INJECTION INTRAMUSCULAR; INTRAVENOUS
Status: DISCONTINUED | OUTPATIENT
Start: 2024-07-25 | End: 2024-07-25

## 2024-07-25 RX ORDER — PROPOFOL 10 MG/ML
VIAL (ML) INTRAVENOUS
Status: DISCONTINUED | OUTPATIENT
Start: 2024-07-25 | End: 2024-07-25

## 2024-07-25 RX ORDER — SERTRALINE HYDROCHLORIDE 100 MG/1
100 TABLET, FILM COATED ORAL DAILY
Status: DISCONTINUED | OUTPATIENT
Start: 2024-07-25 | End: 2024-07-26 | Stop reason: HOSPADM

## 2024-07-25 RX ORDER — ATENOLOL 25 MG/1
50 TABLET ORAL 2 TIMES DAILY
Status: DISCONTINUED | OUTPATIENT
Start: 2024-07-25 | End: 2024-07-26 | Stop reason: HOSPADM

## 2024-07-25 RX ORDER — HYDRALAZINE HYDROCHLORIDE 20 MG/ML
10 INJECTION INTRAMUSCULAR; INTRAVENOUS EVERY 6 HOURS PRN
Status: DISCONTINUED | OUTPATIENT
Start: 2024-07-25 | End: 2024-07-26 | Stop reason: HOSPADM

## 2024-07-25 RX ORDER — LIDOCAINE HYDROCHLORIDE 20 MG/ML
INJECTION INTRAVENOUS
Status: DISCONTINUED | OUTPATIENT
Start: 2024-07-25 | End: 2024-07-25

## 2024-07-25 RX ORDER — OXYCODONE HYDROCHLORIDE 5 MG/1
5 TABLET ORAL EVERY 4 HOURS PRN
Status: DISCONTINUED | OUTPATIENT
Start: 2024-07-25 | End: 2024-07-26 | Stop reason: HOSPADM

## 2024-07-25 RX ORDER — FLUTICASONE FUROATE AND VILANTEROL 100; 25 UG/1; UG/1
1 POWDER RESPIRATORY (INHALATION) 2 TIMES DAILY
Status: DISCONTINUED | OUTPATIENT
Start: 2024-07-25 | End: 2024-07-26 | Stop reason: HOSPADM

## 2024-07-25 RX ORDER — MUPIROCIN 20 MG/G
OINTMENT TOPICAL
Status: DISCONTINUED | OUTPATIENT
Start: 2024-07-25 | End: 2024-07-25

## 2024-07-25 RX ORDER — ACETAMINOPHEN 500 MG
1000 TABLET ORAL
Status: COMPLETED | OUTPATIENT
Start: 2024-07-25 | End: 2024-07-25

## 2024-07-25 RX ORDER — FAMOTIDINE 20 MG/1
20 TABLET, FILM COATED ORAL
Status: COMPLETED | OUTPATIENT
Start: 2024-07-25 | End: 2024-07-25

## 2024-07-25 RX ORDER — ATORVASTATIN CALCIUM 20 MG/1
40 TABLET, FILM COATED ORAL DAILY
Status: DISCONTINUED | OUTPATIENT
Start: 2024-07-25 | End: 2024-07-26 | Stop reason: HOSPADM

## 2024-07-25 RX ORDER — HEPARIN SODIUM 5000 [USP'U]/ML
5000 INJECTION, SOLUTION INTRAVENOUS; SUBCUTANEOUS
Status: DISCONTINUED | OUTPATIENT
Start: 2024-07-25 | End: 2024-07-25

## 2024-07-25 RX ORDER — CYCLOBENZAPRINE HCL 5 MG
10 TABLET ORAL ONCE
Status: COMPLETED | OUTPATIENT
Start: 2024-07-25 | End: 2024-07-25

## 2024-07-25 RX ORDER — GLUCAGON 1 MG
1 KIT INJECTION
Status: DISCONTINUED | OUTPATIENT
Start: 2024-07-25 | End: 2024-07-25

## 2024-07-25 RX ORDER — LIDOCAINE HYDROCHLORIDE 10 MG/ML
0.5 INJECTION, SOLUTION EPIDURAL; INFILTRATION; INTRACAUDAL; PERINEURAL ONCE
Status: DISCONTINUED | OUTPATIENT
Start: 2024-07-25 | End: 2024-07-25

## 2024-07-25 RX ORDER — TALC
6 POWDER (GRAM) TOPICAL NIGHTLY PRN
Status: DISCONTINUED | OUTPATIENT
Start: 2024-07-25 | End: 2024-07-26 | Stop reason: HOSPADM

## 2024-07-25 RX ORDER — ONDANSETRON HYDROCHLORIDE 2 MG/ML
4 INJECTION, SOLUTION INTRAVENOUS DAILY PRN
Status: DISCONTINUED | OUTPATIENT
Start: 2024-07-25 | End: 2024-07-25

## 2024-07-25 RX ORDER — METRONIDAZOLE 500 MG/100ML
500 INJECTION, SOLUTION INTRAVENOUS
Status: DISCONTINUED | OUTPATIENT
Start: 2024-07-25 | End: 2024-07-25

## 2024-07-25 RX ORDER — OXYCODONE HYDROCHLORIDE 5 MG/1
5 TABLET ORAL
Status: DISCONTINUED | OUTPATIENT
Start: 2024-07-25 | End: 2024-07-25

## 2024-07-25 RX ORDER — SODIUM CHLORIDE, SODIUM LACTATE, POTASSIUM CHLORIDE, CALCIUM CHLORIDE 600; 310; 30; 20 MG/100ML; MG/100ML; MG/100ML; MG/100ML
INJECTION, SOLUTION INTRAVENOUS CONTINUOUS
Status: DISCONTINUED | OUTPATIENT
Start: 2024-07-25 | End: 2024-07-25

## 2024-07-25 RX ORDER — IBUPROFEN 200 MG
1 TABLET ORAL DAILY
Status: DISCONTINUED | OUTPATIENT
Start: 2024-07-25 | End: 2024-07-26 | Stop reason: HOSPADM

## 2024-07-25 RX ORDER — DOCUSATE SODIUM 100 MG/1
100 CAPSULE, LIQUID FILLED ORAL 2 TIMES DAILY
Status: DISCONTINUED | OUTPATIENT
Start: 2024-07-25 | End: 2024-07-26 | Stop reason: HOSPADM

## 2024-07-25 RX ORDER — NALOXONE HCL 0.4 MG/ML
0.02 VIAL (ML) INJECTION
Status: DISCONTINUED | OUTPATIENT
Start: 2024-07-25 | End: 2024-07-26 | Stop reason: HOSPADM

## 2024-07-25 RX ORDER — FLUTICASONE FUROATE AND VILANTEROL 100; 25 UG/1; UG/1
1 POWDER RESPIRATORY (INHALATION) DAILY
Status: DISCONTINUED | OUTPATIENT
Start: 2024-07-25 | End: 2024-07-25

## 2024-07-25 RX ORDER — OXYCODONE HYDROCHLORIDE 10 MG/1
10 TABLET ORAL EVERY 4 HOURS PRN
Status: DISCONTINUED | OUTPATIENT
Start: 2024-07-25 | End: 2024-07-26 | Stop reason: HOSPADM

## 2024-07-25 RX ORDER — ONDANSETRON HYDROCHLORIDE 2 MG/ML
8 INJECTION, SOLUTION INTRAVENOUS EVERY 6 HOURS PRN
Status: DISCONTINUED | OUTPATIENT
Start: 2024-07-25 | End: 2024-07-26 | Stop reason: HOSPADM

## 2024-07-25 RX ORDER — BUPIVACAINE HYDROCHLORIDE 2.5 MG/ML
INJECTION, SOLUTION EPIDURAL; INFILTRATION; INTRACAUDAL
Status: DISCONTINUED | OUTPATIENT
Start: 2024-07-25 | End: 2024-07-25 | Stop reason: HOSPADM

## 2024-07-25 RX ORDER — PROCHLORPERAZINE EDISYLATE 5 MG/ML
5 INJECTION INTRAMUSCULAR; INTRAVENOUS EVERY 30 MIN PRN
Status: DISCONTINUED | OUTPATIENT
Start: 2024-07-25 | End: 2024-07-25

## 2024-07-25 RX ORDER — HYDROMORPHONE HYDROCHLORIDE 2 MG/ML
0.4 INJECTION, SOLUTION INTRAMUSCULAR; INTRAVENOUS; SUBCUTANEOUS EVERY 5 MIN PRN
Status: DISCONTINUED | OUTPATIENT
Start: 2024-07-25 | End: 2024-07-25

## 2024-07-25 RX ORDER — ROCURONIUM BROMIDE 10 MG/ML
INJECTION, SOLUTION INTRAVENOUS
Status: DISCONTINUED | OUTPATIENT
Start: 2024-07-25 | End: 2024-07-25

## 2024-07-25 RX ORDER — ACETAMINOPHEN 500 MG
1000 TABLET ORAL EVERY 6 HOURS
Status: DISCONTINUED | OUTPATIENT
Start: 2024-07-25 | End: 2024-07-26 | Stop reason: HOSPADM

## 2024-07-25 RX ORDER — KETAMINE HCL IN 0.9 % NACL 50 MG/5 ML
SYRINGE (ML) INTRAVENOUS
Status: DISCONTINUED | OUTPATIENT
Start: 2024-07-25 | End: 2024-07-25

## 2024-07-25 RX ORDER — SODIUM CHLORIDE, SODIUM LACTATE, POTASSIUM CHLORIDE, CALCIUM CHLORIDE 600; 310; 30; 20 MG/100ML; MG/100ML; MG/100ML; MG/100ML
INJECTION, SOLUTION INTRAVENOUS CONTINUOUS
Status: DISCONTINUED | OUTPATIENT
Start: 2024-07-25 | End: 2024-07-26 | Stop reason: HOSPADM

## 2024-07-25 RX ORDER — FENTANYL CITRATE 50 UG/ML
INJECTION, SOLUTION INTRAMUSCULAR; INTRAVENOUS
Status: DISCONTINUED | OUTPATIENT
Start: 2024-07-25 | End: 2024-07-25

## 2024-07-25 RX ORDER — HYDROMORPHONE HYDROCHLORIDE 2 MG/ML
0.4 INJECTION, SOLUTION INTRAMUSCULAR; INTRAVENOUS; SUBCUTANEOUS
Status: DISCONTINUED | OUTPATIENT
Start: 2024-07-25 | End: 2024-07-26 | Stop reason: HOSPADM

## 2024-07-25 RX ORDER — ALBUTEROL SULFATE 90 UG/1
2 AEROSOL, METERED RESPIRATORY (INHALATION) EVERY 6 HOURS PRN
Status: DISCONTINUED | OUTPATIENT
Start: 2024-07-25 | End: 2024-07-26 | Stop reason: HOSPADM

## 2024-07-25 RX ORDER — DEXAMETHASONE SODIUM PHOSPHATE 4 MG/ML
INJECTION, SOLUTION INTRA-ARTICULAR; INTRALESIONAL; INTRAMUSCULAR; INTRAVENOUS; SOFT TISSUE
Status: DISCONTINUED | OUTPATIENT
Start: 2024-07-25 | End: 2024-07-25

## 2024-07-25 RX ORDER — KETOROLAC TROMETHAMINE 30 MG/ML
15 INJECTION, SOLUTION INTRAMUSCULAR; INTRAVENOUS EVERY 6 HOURS
Status: COMPLETED | OUTPATIENT
Start: 2024-07-25 | End: 2024-07-25

## 2024-07-25 RX ORDER — HEPARIN SODIUM 5000 [USP'U]/ML
5000 INJECTION, SOLUTION INTRAVENOUS; SUBCUTANEOUS EVERY 8 HOURS
Status: DISCONTINUED | OUTPATIENT
Start: 2024-07-25 | End: 2024-07-26 | Stop reason: HOSPADM

## 2024-07-25 RX ORDER — ONDANSETRON HYDROCHLORIDE 2 MG/ML
INJECTION, SOLUTION INTRAVENOUS
Status: DISCONTINUED | OUTPATIENT
Start: 2024-07-25 | End: 2024-07-25

## 2024-07-25 RX ADMIN — HEPARIN SODIUM 5000 UNITS: 5000 INJECTION INTRAVENOUS; SUBCUTANEOUS at 03:07

## 2024-07-25 RX ADMIN — PROPOFOL 120 MG: 10 INJECTION, EMULSION INTRAVENOUS at 07:07

## 2024-07-25 RX ADMIN — LIDOCAINE HYDROCHLORIDE 50 MG: 20 INJECTION, SOLUTION INTRAVENOUS at 07:07

## 2024-07-25 RX ADMIN — ATENOLOL 50 MG: 25 TABLET ORAL at 08:07

## 2024-07-25 RX ADMIN — FENTANYL CITRATE 50 MCG: 0.05 INJECTION, SOLUTION INTRAMUSCULAR; INTRAVENOUS at 10:07

## 2024-07-25 RX ADMIN — HYDROMORPHONE HYDROCHLORIDE 0.4 MG: 2 INJECTION INTRAMUSCULAR; INTRAVENOUS; SUBCUTANEOUS at 10:07

## 2024-07-25 RX ADMIN — ALBUMIN (HUMAN) 500 ML: 12.5 SOLUTION INTRAVENOUS at 07:07

## 2024-07-25 RX ADMIN — ROCURONIUM BROMIDE 10 MG: 10 INJECTION INTRAVENOUS at 09:07

## 2024-07-25 RX ADMIN — SERTRALINE HYDROCHLORIDE 100 MG: 100 TABLET ORAL at 03:07

## 2024-07-25 RX ADMIN — CEFAZOLIN 2 G: 2 INJECTION, POWDER, FOR SOLUTION INTRAMUSCULAR; INTRAVENOUS at 07:07

## 2024-07-25 RX ADMIN — HYDROMORPHONE HYDROCHLORIDE 0.2 MG: 2 INJECTION INTRAMUSCULAR; INTRAVENOUS; SUBCUTANEOUS at 11:07

## 2024-07-25 RX ADMIN — ATORVASTATIN CALCIUM 40 MG: 20 TABLET, FILM COATED ORAL at 03:07

## 2024-07-25 RX ADMIN — IPRATROPIUM BROMIDE AND ALBUTEROL SULFATE 3 ML: 2.5; .5 SOLUTION RESPIRATORY (INHALATION) at 12:07

## 2024-07-25 RX ADMIN — Medication 1 PATCH: at 03:07

## 2024-07-25 RX ADMIN — FENTANYL CITRATE 50 MCG: 0.05 INJECTION, SOLUTION INTRAMUSCULAR; INTRAVENOUS at 08:07

## 2024-07-25 RX ADMIN — CYCLOBENZAPRINE HYDROCHLORIDE 10 MG: 5 TABLET, FILM COATED ORAL at 11:07

## 2024-07-25 RX ADMIN — MELATONIN TAB 3 MG 6 MG: 3 TAB at 08:07

## 2024-07-25 RX ADMIN — MUPIROCIN: 20 OINTMENT TOPICAL at 05:07

## 2024-07-25 RX ADMIN — ACETAMINOPHEN 1000 MG: 500 TABLET ORAL at 05:07

## 2024-07-25 RX ADMIN — HEPARIN SODIUM 5000 UNITS: 5000 INJECTION INTRAVENOUS; SUBCUTANEOUS at 10:07

## 2024-07-25 RX ADMIN — FENTANYL CITRATE 100 MCG: 0.05 INJECTION, SOLUTION INTRAMUSCULAR; INTRAVENOUS at 07:07

## 2024-07-25 RX ADMIN — DOCUSATE SODIUM 100 MG: 100 CAPSULE, LIQUID FILLED ORAL at 03:07

## 2024-07-25 RX ADMIN — OXYCODONE HYDROCHLORIDE 10 MG: 10 TABLET ORAL at 08:07

## 2024-07-25 RX ADMIN — ATENOLOL 50 MG: 25 TABLET ORAL at 03:07

## 2024-07-25 RX ADMIN — SUGAMMADEX 200 MG: 100 INJECTION, SOLUTION INTRAVENOUS at 10:07

## 2024-07-25 RX ADMIN — ROCURONIUM BROMIDE 50 MG: 10 INJECTION INTRAVENOUS at 07:07

## 2024-07-25 RX ADMIN — FAMOTIDINE 20 MG: 20 TABLET, FILM COATED ORAL at 05:07

## 2024-07-25 RX ADMIN — LIDOCAINE HYDROCHLORIDE 5 MG: 10 INJECTION, SOLUTION EPIDURAL; INFILTRATION; INTRACAUDAL; PERINEURAL at 05:07

## 2024-07-25 RX ADMIN — IPRATROPIUM BROMIDE AND ALBUTEROL SULFATE 3 ML: 2.5; .5 SOLUTION RESPIRATORY (INHALATION) at 04:07

## 2024-07-25 RX ADMIN — Medication 50 MG: at 07:07

## 2024-07-25 RX ADMIN — MUPIROCIN: 20 OINTMENT TOPICAL at 08:07

## 2024-07-25 RX ADMIN — ONDANSETRON HYDROCHLORIDE 4 MG: 2 INJECTION INTRAMUSCULAR; INTRAVENOUS at 10:07

## 2024-07-25 RX ADMIN — HEPARIN SODIUM 5000 UNITS: 5000 INJECTION INTRAVENOUS; SUBCUTANEOUS at 06:07

## 2024-07-25 RX ADMIN — LEVALBUTEROL HYDROCHLORIDE 0.63 MG: 0.63 SOLUTION RESPIRATORY (INHALATION) at 06:07

## 2024-07-25 RX ADMIN — MIDAZOLAM HYDROCHLORIDE 2 MG: 1 INJECTION INTRAMUSCULAR; INTRAVENOUS at 07:07

## 2024-07-25 RX ADMIN — MUPIROCIN: 20 OINTMENT TOPICAL at 03:07

## 2024-07-25 RX ADMIN — OXYCODONE HYDROCHLORIDE 5 MG: 5 TABLET ORAL at 10:07

## 2024-07-25 RX ADMIN — KETOROLAC TROMETHAMINE 15 MG: 30 INJECTION, SOLUTION INTRAMUSCULAR; INTRAVENOUS at 05:07

## 2024-07-25 RX ADMIN — METRONIDAZOLE 500 MG: 500 INJECTION, SOLUTION INTRAVENOUS at 07:07

## 2024-07-25 RX ADMIN — CARBOXYMETHYLCELLULOSE SODIUM 2 DROP: 2.5 SOLUTION/ DROPS OPHTHALMIC at 07:07

## 2024-07-25 RX ADMIN — DOCUSATE SODIUM 100 MG: 100 CAPSULE, LIQUID FILLED ORAL at 08:07

## 2024-07-25 RX ADMIN — SODIUM CHLORIDE, SODIUM LACTATE, POTASSIUM CHLORIDE, AND CALCIUM CHLORIDE: 600; 310; 30; 20 INJECTION, SOLUTION INTRAVENOUS at 10:07

## 2024-07-25 RX ADMIN — ROCURONIUM BROMIDE 20 MG: 10 INJECTION INTRAVENOUS at 08:07

## 2024-07-25 RX ADMIN — SODIUM CHLORIDE, SODIUM LACTATE, POTASSIUM CHLORIDE, AND CALCIUM CHLORIDE: 600; 310; 30; 20 INJECTION, SOLUTION INTRAVENOUS at 06:07

## 2024-07-25 RX ADMIN — KETOROLAC TROMETHAMINE 15 MG: 30 INJECTION, SOLUTION INTRAMUSCULAR; INTRAVENOUS at 11:07

## 2024-07-25 RX ADMIN — ACETAMINOPHEN 1000 MG: 500 TABLET ORAL at 12:07

## 2024-07-25 RX ADMIN — DEXAMETHASONE SODIUM PHOSPHATE 8 MG: 4 INJECTION, SOLUTION INTRAMUSCULAR; INTRAVENOUS at 07:07

## 2024-07-25 RX ADMIN — IPRATROPIUM BROMIDE AND ALBUTEROL SULFATE 3 ML: 2.5; .5 SOLUTION RESPIRATORY (INHALATION) at 08:07

## 2024-07-25 RX ADMIN — FLUTICASONE FUROATE AND VILANTEROL TRIFENATATE 1 PUFF: 100; 25 POWDER RESPIRATORY (INHALATION) at 09:07

## 2024-07-25 RX ADMIN — ROCURONIUM BROMIDE 20 MG: 10 INJECTION INTRAVENOUS at 07:07

## 2024-07-25 RX ADMIN — OXYCODONE HYDROCHLORIDE 10 MG: 10 TABLET ORAL at 03:07

## 2024-07-25 RX ADMIN — SODIUM CHLORIDE, POTASSIUM CHLORIDE, SODIUM LACTATE AND CALCIUM CHLORIDE: 600; 310; 30; 20 INJECTION, SOLUTION INTRAVENOUS at 03:07

## 2024-07-25 NOTE — PROGRESS NOTES
Progress Note  Gynecology    Admit Date: 7/25/2024  LOS: 1    Reason for Admission:  Status post sacrocolpopexy    SUBJECTIVE:     Kate Taylor is a 69 y.o. who is POD#1 s/p RA-SCP/rectopexy/cysto/flex sig/bulkamid with CRS for the treatment of vaginal and rectal prolapse and MARGE.    Pt doing well this morning. Pain is well controlled. Required one oxy 10 overnight in addition to scheduled ibuprofen and tylenol. She is tolerating a regular diet without N/V. Has not yet ambulated. Voiding without difficulty via rojas. Passing flatus. She is not yet having bowel movements.    OBJECTIVE:     Vital Signs   Temp:  [97 °F (36.1 °C)-98.3 °F (36.8 °C)] 97.5 °F (36.4 °C)  Pulse:  [] 60  Resp:  [14-18] 18  SpO2:  [94 %-100 %] 99 %  BP: (118-167)/(60-80) 137/65      Intake/Output Summary (Last 24 hours) at 7/26/2024 0704  Last data filed at 7/26/2024 0548  Gross per 24 hour   Intake 2100 ml   Output 1830 ml   Net 270 ml       Physical Exam:  Gen: A&Ox3, NAD  CV: RRR  Pulm: LCTAB, normal respiratory effort  Abd: active distant bowel sounds, soft, non-distended, non-tender to palpation without rebound or guarding  Inc: lsc port sites clean, dry, intact, with steri strips and band-aids overlying  Ext: PPP, no peripheral edema, TEDs/SCDs in place  : Rojas in place draining clear yellow urine     Laboratory:  Recent Results (from the past 24 hour(s))   Basic metabolic panel    Collection Time: 07/26/24  5:12 AM   Result Value Ref Range    Sodium 137 136 - 145 mmol/L    Potassium 2.9 (L) 3.5 - 5.1 mmol/L    Chloride 104 95 - 110 mmol/L    CO2 21 (L) 23 - 29 mmol/L    Glucose 113 (H) 70 - 110 mg/dL    BUN 14 8 - 23 mg/dL    Creatinine 0.9 0.5 - 1.4 mg/dL    Calcium 9.5 8.7 - 10.5 mg/dL    Anion Gap 12 8 - 16 mmol/L    eGFR >60 >60 mL/min/1.73 m^2   CBC auto differential    Collection Time: 07/26/24  5:12 AM   Result Value Ref Range    WBC 9.36 3.90 - 12.70 K/uL    RBC 4.45 4.00 - 5.40 M/uL    Hemoglobin 12.8 12.0 -  16.0 g/dL    Hematocrit 38.1 37.0 - 48.5 %    MCV 86 82 - 98 fL    MCH 28.8 27.0 - 31.0 pg    MCHC 33.6 32.0 - 36.0 g/dL    RDW 12.4 11.5 - 14.5 %    Platelets 260 150 - 450 K/uL    MPV 10.3 9.2 - 12.9 fL    Immature Granulocytes 0.4 0.0 - 0.5 %    Gran # (ANC) 8.2 (H) 1.8 - 7.7 K/uL    Immature Grans (Abs) 0.04 0.00 - 0.04 K/uL    Lymph # 0.5 (L) 1.0 - 4.8 K/uL    Mono # 0.5 0.3 - 1.0 K/uL    Eos # 0.0 0.0 - 0.5 K/uL    Baso # 0.03 0.00 - 0.20 K/uL    nRBC 0 0 /100 WBC    Gran % 87.8 (H) 38.0 - 73.0 %    Lymph % 5.6 (L) 18.0 - 48.0 %    Mono % 5.8 4.0 - 15.0 %    Eosinophil % 0.1 0.0 - 8.0 %    Basophil % 0.3 0.0 - 1.9 %    Differential Method Automated        Diagnostic Results:  Lab Results   Component Value Date    WBC 9.36 07/26/2024    HGB 12.8 07/26/2024    HCT 38.1 07/26/2024    MCV 86 07/26/2024     07/26/2024         BMP  Lab Results   Component Value Date     07/26/2024    K 2.9 (L) 07/26/2024     07/26/2024    CO2 21 (L) 07/26/2024    BUN 14 07/26/2024    CREATININE 0.9 07/26/2024    CALCIUM 9.5 07/26/2024    ANIONGAP 12 07/26/2024    EGFRNORACEVR >60 07/26/2024     Lab Results   Component Value Date    ALT 23 06/11/2024    AST 14 06/11/2024    ALKPHOS 63 06/11/2024    BILITOT 0.3 06/11/2024          ASSESSMENT/PLAN:     Active Hospital Problems    Diagnosis  POA    *S/p RA-sacrocolpopexy/rectopexy/cysto/flex sig/bulkamid 7/25/24 [Z98.890]  Not Applicable      Resolved Hospital Problems   No resolved problems to display.       Assessment: 69 y.o. POD#1 s/p RA-SCP/rectopexy/cysto/flex sig/bulkamid with CRS for the treatment of vaginal and rectal prolapse and MARGE, recovering well and in stable condition    Plan:   1. Post-op   - Routine post-op advances  - Continue PRN pain medications  - D/C rojas and perform spontaneous voiding trial  - UOP adequate, 1500 cc overnight (1.72 cc/kg/hr)  - Encourage ambulation   - Encourage IS  - CBC/BMP WNL with exception of K 2.9>replaced  - Tolerating  regular diet, discontinue IVF  - Antiemetics prn nausea/vomiting.    2. Asthma/COPD/Sarcoidosis  -continued home albuterol PRN, advair BID  -Duo-nebs h1buasq while awake ordered  -Encourage IS  -asymptomatic this AM    3. Anxiety  -continued home zoloft    4. HTN  -continued home atenolol  -home HCTZ, lasix held   -hydral PRN for SBP >180, none required    5. H/o tobacco use  -nicotine patch ordered  -counseled on resources for smoking cessation, motivated to quit     6. HLD  -continued home statin     VTE ppx: heparin  q 8    Dispo: As patient meets appropriate post-op milestones, plan for discharge to home.    Rivka Rodney MD, MPH  OBGYN PGY-4

## 2024-07-25 NOTE — OP NOTE
Date of surgery: 07/25/2024    Operative Report  Pre-operative diagnosis: rectal prolapse  Post-operative diagnosis: Same as above    Procedure:  Robotic suture rectopexy  Flexible sigmiodoscopy    Findings:  Full thickness rectal prolapse  Healthy, patent rectum following rectopexy          Surgeon: Clementina Snyder MD   Assistant:  none    Indication: Ms. Taylor is a 69 year old woman with a history of pelvic organ prolapse  who is scheduled to undergo repair with Dr. Rausch and robotic suture rectopexy with myself. The benefits, risks, and alternatives were discussed with the patient, they were given the opportunity to ask questions and they elected to proceed with operative intervention after signing written consent.    Procedure:  Dr. Rausch first proceeded with her portion of the case.  Please see her operative dictation for further details.      I then elevated the sigmoid colon and scored the peritoneum.  I was able to enter the retrorectal plane.  This was developed posteriorly down to pelvic floor.  I then worked around to the right lateral sidewall and anteriorly into the rectovaginal septum down to pelvic floor.  The left lateral attachments were left in place.  Once the rectum was adequately mobilized, it was placed on appropriate tension.  The mesorectum was then secured to the anterior ligament with interrupted ethibond sutures.     The proximal colon was then occluded.  I inserted a colonoscope through the anus and advanced this to the sigmoid colon.  The rectum was patent and the mucosa was healthy appearing.  Insufflation was evacuated and rectum desufflated.      Dr. Rausch then concluded the case.        Complications: None  Estimated blood loss: 20 mL  Disposition: PACU    Clementina Snyder MD  Staff Surgeon   Colon & Rectal Surgery

## 2024-07-25 NOTE — OP NOTE
Date of Operation: 07/25/2024    Title of Operation:  1)  Robotic-assisted laparoscopic reinforcement of vagina with Gentrix surgical matrix  2)  Robotic-assisted laparoscopic sacral colpopexy with polypropylene mesh   3)  Cystourethroscopy with injection of periurethral bulking agent (Bulkamid)  4)  Suture rectopexy and sigmoidoscopy per colon and rectal surgery. Please see operative note per Dr. Snyder for full detail.     Indications for Surgery:  Last HPI from 02/19/2024  1)  UI:  (+) MARGE > (+) UUI (if holds too long) X years. (+) pads:2/day, usually minimum wetness and 1/night usually minimum wetness unless coughs/sneezes.  Daytime frequency: Q 2 hours.  Nocturia: Yes: 1/night.   (--) dysuria,  (--) hematuria,  (--) frequent UTIs.  (+) complete bladder emptying.     UDS 7/2024:  3.  URETHRAL FUNCTION/STORAGE PHASE:     a.  WITH prolapse reduction:  CLPP (153 mL): Positive  at  126 cm H20  VLPP (153 mL): Positive  at  84 cm H20   CLPP (300 mL): Positive  at  102 cm H20  VLPP (300 mL): Positive  at  119 cm H20      These findings are consistent with Positive urodynamic stress incontinence.     Assessment:  UF with insufficient volume for interpretation.   PF with concern for voiding dysfunction (Valsalva assist).  Compliance normal.  Max capacity 332 mL.  DO (--).  MARGE (+).   --cysto: normal     2)  POP:  Present. above introitus. Worsening.  Worse when on feet or after diarrhea.  Symptoms:(+)  pressure, discomfort.  (--) vaginal bleeding. (--) vaginal discharge. (--) sexually active.  (+) dyspareunia--last 10 years ago, ? Due to POP.   (--)  Vaginal dryness.  (--) vaginal estrogen use.   --POP-Q:  Aa -2; Ba -2; C -8; Ap -2; Bp -2.  Genital hiatus 2, perineal body 2. total vaginal length 9. (Standing)  Pvr 20 mL     3)  BM:  (+) constipation/straining. Intermittent with  (+) chronic diarrhea. Takes miralax PRN for constipation.  Not taking recommended fiber.  (--) hematochezia.  (+) fecal incontinence--mostly with  diarrhea.  (+) fecal smearing/urgency.  (--) complete evacuation.  +rectal prolapse: Has photos of prolapse at home which show cystocele and full thickness rectal prolapse.      Changes from last visit:  1)  L rotator cuff tear:  --saw ortho--had injection  --Scheduled to follow up     2)  Rectal prolapse:  --surgery per Dr. Snyder     3)  ?pelvic organ prolapse:  --no major on exam on initial exam  --has seen bulge in past (identified area with mirror--seems to be at introitus)--showed phone photo--scanned into media:  --mri defacography: 05/01/2024  1.    Anatomic findings: Post hysterectomy.  Normal levator hiatus and anorectal junction.   2.    Anterior compartment findings: Mild cystocele.   3.    Middle compartment findings: Mild vaginal prolapse.   4.    Posterior compartment findings: Mild rectocele.     4)  Vaginal atrophy (dryness):    --did not start vaginal estrogen cream     5)  Straining with stools (intermittent C/D) + fecal incontinence:  --reports fecal incontinence  --taking fiber supplement  --rectal prolapse worse with asthma exacerbation     6)  Mixed urinary incontinence, urge < stress:    --went to pelvic floor PT x 2 sessions  --+MARGE/ UUI    Preoperative Diagnosis:  1)  Vaginal vault prolapse  2)  Rectal prolapse  3)  Vaginal atrophy  4)  Straining with stools  5)  Irregular bowel habits  6)  Fecal incontinence  7)  Mixed urinary incontinence  8)  Concern for voiding dysfunction (Valsalva assist)  9)  Urodynamic stress incontinence    Postoperative Diagnosis:  1)  Vaginal vault prolapse  2)  Rectal prolapse  3)  Vaginal atrophy  4)  Straining with stools  5)  Irregular bowel habits  6)  Fecal incontinence  7)  Mixed urinary incontinence  8)  Concern for voiding dysfunction (Valsalva assist)  9)  Urodynamic stress incontinence  10)  Tobacco use    Anesthesia:  General endotracheal anesthesia.  Additionally, 0.5% marcaine  was injected prior to placement of laparoscopic trocars for local  anesthesia.    Specimen (Bacteriological, Pathological or other):  none    Vaginal Packing (type and #):   none    Prosthetic Device/Implant:  1)  Gentrix surgical matrix.  LOT# [id number].    2)  Gynecare gynemesh (10 x 15).  LOT# [id number].   3)  Interceed barrier. LOT# see nursing.   4)  Bulkamid (1.5 mL).  LOT# [id number].     Surgeons Narrative:    Surgeon: Mihir Rausch MD    Assistants:  Rivka Siu MD (PGY4).  ANITA Sahu (insufficient resident assistance).      Intravenous Fluids:  1200 mL     Estimated Blood Loss:  <50 mL     Urine Output:  80 mL     Counts:  Sponge, lap, needle counts correct x 2.     Drains: Banerjee catheter.     Disposition:  The patient was sent to the PACU in stable condition.     Findings:     1.  On exam under anesthesia,  normal external female genitalia. There was prolapse as previously noted in clinic.  Uterus and cervix: Absent  Adnexa: non palpable.  +rectal prolapse.     2.  On laparoscopic survey:    --The bowel was grossly Normal.    --The appendix was absent.   --The uterus and cervix, bilateral tubes/ovaries were absent.   --The liver margin Normal.   --The gall bladder was present and Normal.   --Bilateral ureters were visualized and noted to vermiculate at the start and close of the case.    --Adhesions were absent.    --Other findings included:  none     3.  On cystoscopy, the bladder mucosa was Normal.  After RASC/rectopexy/bulkamid, there was no suture or mesh within the bladder mucosa.  The ureteral orifices were visualized bilaterally with (+) noted good efflux x 2. On a systematic survey of the bladder dome to the base of the urethrovesical junction, there were not other abnormalities noted. The urethra was normal on retraction of the scope. After injection of bulkamid, there was excellent coaptation of the urethra.     4.  Sigmoidoscopy:  After Sacral colpopexy and rectopexy, sigmoidoscopy was normal per colon and rectal surgery.  Please see note per Dr. Snyder for  further detail.     Description of procedure:    The patient was identified in the preoperative area where informed consent was confirmed, and she was taken to the operating room where an adequate level of general anesthesia was obtained.  The patient was positioned in lithotomy position with legs in Viet stirrups. Care was taken to avoid joint hyperflexion or hyperextension, and all extremity surfaces were carefully padded so as to minimize risk of neurologic injury. Intravenous antibiotics were administered preoperatively. Sequential compression devices were applied to the patient's lower extremities preoperatively and heparin was administered subcutaneously for VTE prophylaxis.  Surgical time-out was performed, where the patient was identified and procedures confirmed.  An examination under anesthesia was performed with findings described as above.  The patient's abdomen, perineum, and vagina were sterilely prepped and draped. A rojas catheter was placed in the bladder for drainage.      First, we placed laparoscopic ports. Before placement of each laparoscopic port, several mL of 0.25% Marcaine were injected subcutaneously as well as deeply into the tissue of each site.  First, a supraumbilical incision of 5-mm was made, and the 5 mm trocar was inserted into the incision until peritoneal entry was gained and confirmed. Carbon dioxide was insufflated through the port until an adequate pneumoperitoneum of no greater than 15 mm Hg was obtained.  The laparoscopic camera was inserted through this port for visualization of all subsequent port placement.  Two 8 mm ports were place on bilaterally to the supraumbilical port, each approximately 8-10 cm lateral, along the mid clavicular line at the level of the umbilicus, at least 2 cm cephalad and medial of the anterior superior iliac spine. Each 8 mm trocar was inserted under direct visualization without significant trauma.   An 8 mm airseal port was placed in the right  upper quadrant, superior to the umbilicus and midway between the right lower quadrant port and the umbilicus in a triangulated fashion. A third 8 mm port was placed at the left side, midway between the left lower quadrant port and the umbilicus.  The 5 mm umbilical camera port was then exchanged for a 8 mm robotic camera port.  There were no complications with these port placements. A total of 5 ports had been placed, including the supraumbilical port for the camera. The robot was then docked.      We then proceeded with sacral colpopexy. We gently retracted the sigmoid colon to the patient's left so that we could easily visualize the sacral promontory.  The peritoneum was opened over the sacral  promontory, and the presacral space was developed.  Care was used to avoid any trauma to the vasculature or nerve supply underlying this space during this process, and excellent hemostasis was noted throughout. Peritoneal dissection was continued over the sacral promontory to provide adequate space for attachment of the GyneMesh. This process was aided with blunt dissection using Kittners. The peritoneum was then opened cadually all the way to the vaginal cuff.  With the EEA sizers in the vagina and in the rectum, the rectovaginal space was demonstrated. The peritoneum overlying this spaced was opened and the space bluntly dissected down to approximately the distal 1/3 of the vagina. Hemostasis was maintained with electrocautery. After retrograde filling the bladder to demonstrate the most cephalad portion of the bladder, the bladder was dissected off the vaginal cuff and anterior vagina.  Again, excellent hemostasis was noted. The vagina was thinned in some areas.  So, Gentrix surgical matrix was attached to the anterior/posterior aspects of the vagina with several interrupted sutures of 2-0 vicryl.      Next, colon and rectal surgery performed dissection for their suture rectopexy. Please see their note for full detail.       We then, completed sacral colpopexy. Two pieces of Gynemesh each about 4 cm in width x 15 cm in length were cut. These were broadly affixed to the vagina and underlying Gentrix anteriorly and posteriorly with several rows of interrupted sutures of 2-0 PDS, consuelo 6 sutures on the posterior vagina and 4 sutures on the anterior vagina. With the EEA sizer deviating the vagina to the right pararectal space, the pieces of mesh were tensioned appropriately and sutured to the anterior ligament overlying the presacral space at consuelo the level of S1 and S2 using 2 x 0-Ethibond sutures.  Care was taken to make sure that the mesh was not placed under tension. Excellent hemostasis was noted during this process.  Care was used to avoid trauma to the presacral vasculature during this step. Excess mesh was removed. Next colon and rectal surgery completed rectopexy by attaching their sutures to the sacral promontory. A purse string stitch of 2-0 monocryl was used to close the peritoneum over the sacral promontory and along the entire extent of the GyneMesh to the vaginal cuff.  Excellent hemostasis was noted at the end of the procedure. All needles and suture pieces were removed from the pelvis laparoscopically throughout the procedure.  Needles, sponge, and lap counts were correct x 2 at the end of the procedure. At the end of the robotic portion of the case, another pelvic survey was completed.  The pelvis was irrigated, all irrigants removed. All operative areas were noted to be hemostatic.      Next, cystourethroscopy was performed.  Findings were as noted above.  There were  no other abnormalities noted.  Ureteral orifices were noted to have good efflux bilaterally.  The bladder was drained, and a Banerjee catheter was replaced.    Sigmoidoscopy was performed and normal as per CRS.      At this point, robotic/laparoscopic portion of the procedure was completed. The pelvis was irrigated, and all irrigants were removed. Interceed  was placed along all suture lines and planes of dissection to reduction future adhesion formation.  All abdominal incisions were <1 cm, and fascial closure was not needed. The abdomen was deflated and all laparoscopic sleeves and other instruments were removed from the abdomen. All laparoscopic skin incisions were closed with subcuticular stitches of 4-0 Monocryl and secured with steri strips and band-aids.  Excellent cosmesis and hemostasis was noted.             Next, we turned our attention vaginally.  All walls were well-supported, and further repairs were not needed.      Next, we performed periurethral injections.  The Bulkamid sheath and the water inflow was adjusted to produce an adequate stream. The Bulkamid Needle was then placed ¾ of the way into the needle channel of the rotatable sheath and the entire Bulkamid system was then advanced into the urethra until the bladder is visualised and inspected. The Bulkamid needle was then advanced into the needle channel on the rotatable sheath until the tip of the sheath is adjacent to the bladder neck.     The sheath was then rotated to the 7 o'clock position. The needed was then extend into the bladder until the 2 cm caroline on the needle is visible. The Bulkamid system was then retracted until the tip of the needle was resting on the bladder neck. The needle was then retracted into the sheath and approximately 1.5 cm from the bladder neck the Bulkamid system was pressed parallel against the urethral wall and the needle is then advanced into the submucosal tissue ensuring that the bevel of the needle was facing towards the lumen. The needle was advanced approximately 0.5 cm, and the Bulkamid hydrogel was then injected until the Bulkamid cushion was visible and reached the midline of the urethral lumen. The needle was then retracted back into the rotatable sheath, and rotated to the next injection site. Subsequent injections were preformed at 5 o'clock, 2 o'clock and  10 o'clock all along the same plane as the original injection until all (4) cushions met at the midline of the urethral lumen. 1.5 mls Total of Bulkamid Hydrogel was used.     Sufficient fluid was left in the bladder so that the patient would be able to perform a voiding trial before discharge.     At the close of the case, all counts were correct x2.  The vagina was irrigated, and all irrigants were removed.   The patient was awakened from anesthesia and was taken to the Recovery Room in stable condition.  She tolerated the procedure well.    I was present and scrubbed for the entire procedure.

## 2024-07-25 NOTE — PLAN OF CARE
Pt is AAOx3.  Independent at baseline.  DME includes nebulizer and cane.  Pt PCP is Suri Stinson NP at [facility] at [address] [facility].  Family will transport pt home when discharged.   07/25/24 1521   Discharge Assessment   Assessment Type Discharge Planning Assessment   Confirmed/corrected address, phone number and insurance Yes   Confirmed Demographics Correct on Facesheet   Source of Information patient   Communicated YULIYA with patient/caregiver Date not available/Unable to determine   Reason For Admission S/p RA-sacrocolpopexy/rectopexy/cysto/flex sig/bulkamid 7/25/24   People in Home alone   Do you expect to return to your current living situation? Yes   Do you have help at home or someone to help you manage your care at home? No   Prior to hospitilization cognitive status: Alert/Oriented   Current cognitive status: Alert/Oriented   Walking or Climbing Stairs Difficulty yes   Walking or Climbing Stairs ambulation difficulty, requires equipment   Dressing/Bathing Difficulty no   Equipment Currently Used at Home nebulizer;cane, straight   Readmission within 30 days? No   Patient currently being followed by outpatient case management? No   Do you currently have service(s) that help you manage your care at home? No   Do you take prescription medications? Yes   Do you have prescription coverage? Yes   Do you have any problems affording any of your prescribed medications? No   Is the patient taking medications as prescribed? yes   Who is going to help you get home at discharge? family   How do you get to doctors appointments? family or friend will provide;health plan transportation   Are you on dialysis? No   Do you take coumadin? No   Discharge Plan A Home with family   Discharge Plan B Home   DME Needed Upon Discharge  none   Discharge Plan discussed with: Patient   Transition of Care Barriers None     [facility] - Med [facility]urg ([address])  Initial Discharge Assessment       Primary Care Provider: No primary care  provider on file.    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]  Rectal prolapse [K62.3]    Admission Date: 7/25/2024  Expected Discharge Date:     Transition of Care Barriers: (P) None    Payor: HUMANA MANAGED MEDICARE / Plan: HUMANA MEDICARE [facility] / Product Type: Medicare Advantage /     Extended Emergency Contact Information  Primary Emergency Contact: Gallito Taylor  Address: [address] [place] [address] [facility]  Home Phone: [phone]  Work Phone: [phone]  Mobile Phone: [phone]  Relation: Son  Secondary Emergency Contact: Sue Chan  Mobile Phone: [phone]  Relation: Sister  Preferred language: English   needed? No    Discharge Plan A: (P) Home with family  Discharge Plan B: (P) Home      [id number] [place], [place] - [address] [facility] [address]  [address] [facility] [address]  Phone: [phone] Fax: [phone]    [facility] - [place], [place] - [address]  [address]  [place] [address]  Phone: [phone] Fax: [phone]      Initial Assessment (most recent)       Adult Discharge Assessment - 07/25/24 1521          Discharge Assessment    Assessment Type Discharge Planning Assessment (P)      Confirmed/corrected address, phone number and insurance Yes (P)      Confirmed Demographics Correct on Facesheet (P)      Source of Information patient (P)      Communicated YULIYA with patient/caregiver Date not available/Unable to determine (P)      Reason For Admission S/p RA-sacrocolpopexy/rectopexy/cysto/flex sig/bulkamid 7/25/24 (P)      People in Home alone (P)      Do you expect to return to your current living situation? Yes (P)      Do you have help at home or someone to help you manage your care at home? No (P)      Prior to hospitilization cognitive status: Alert/Oriented (P)      Current cognitive status:  Alert/Oriented (P)      Walking or Climbing Stairs Difficulty yes (P)      Walking or Climbing Stairs ambulation difficulty, requires equipment (P)      Dressing/Bathing Difficulty no (P)      Equipment Currently Used at Home nebulizer;cane, straight (P)      Readmission within 30 days? No (P)      Patient currently being followed by outpatient case management? No (P)      Do you currently have service(s) that help you manage your care at home? No (P)      Do you take prescription medications? Yes (P)      Do you have prescription coverage? Yes (P)      Do you have any problems affording any of your prescribed medications? No (P)      Is the patient taking medications as prescribed? yes (P)      Who is going to help you get home at discharge? family (P)      How do you get to doctors appointments? family or friend will provide;health plan transportation (P)      Are you on dialysis? No (P)      Do you take coumadin? No (P)      Discharge Plan A Home with family (P)      Discharge Plan B Home (P)      DME Needed Upon Discharge  none (P)      Discharge Plan discussed with: Patient (P)      Transition of Care Barriers None (P)

## 2024-07-25 NOTE — ANESTHESIA POSTPROCEDURE EVALUATION
Anesthesia Post Evaluation    Patient: Kate Taylor    Procedure(s) Performed: Procedure(s) (LRB):  ROBOTIC SACROCOLPOPEXY, ABDOMEN (N/A)  RECTOPEXY, ROBOTIC SUTURE (N/A)  SIGMOIDOSCOPY, FLEXIBLE (N/A)  CYSTOSCOPY, WITH PERIURETHRAL BULKING AGENT INJECTION (N/A)    Final Anesthesia Type: general      Patient location during evaluation: PACU  Patient participation: Yes- Able to Participate  Level of consciousness: awake and alert  Post-procedure vital signs: reviewed and stable  Pain management: adequate  Airway patency: patent    PONV status at discharge: No PONV  Anesthetic complications: no      Cardiovascular status: blood pressure returned to baseline  Respiratory status: unassisted and spontaneous ventilation  Hydration status: euvolemic  Follow-up not needed.              Vitals Value Taken Time   /90 07/25/24 1302   Temp 36.3 °C (97.4 °F) 07/25/24 1232   Pulse 98 07/25/24 1304   Resp 18 07/25/24 1245   SpO2 96 % 07/25/24 1304   Vitals shown include unfiled device data.      Event Time   Out of Recovery 13:37:22         Pain/Brayan Score: Pain Rating Prior to Med Admin: 4 (7/25/2024 12:44 PM)  Brayan Score: 9 (7/25/2024 12:44 PM)

## 2024-07-25 NOTE — ANESTHESIA PROCEDURE NOTES
Intubation    Date/Time: 7/25/2024 7:18 AM    Performed by: Yamel Avery CRNA  Authorized by: Gene Hall MD    Intubation:     Induction:  Intravenous    Intubated:  Postinduction    Mask Ventilation:  Easy with oral airway    Attempts:  1    Attempted By:  CRNA    Method of Intubation:  Video laryngoscopy    Blade:  Hammond 3    Laryngeal View Grade: Grade I - full view of cords      Difficult Airway Encountered?: No      Complications:  None    Airway Device:  Oral endotracheal tube    Airway Device Size:  7.0    Style/Cuff Inflation:  Cuffed (inflated to minimal occlusive pressure)    Tube secured:  20    Secured at:  The lips    Placement Verified By:  Capnometry    Complicating Factors:  None    Findings Post-Intubation:  BS equal bilateral and atraumatic/condition of teeth unchanged

## 2024-07-25 NOTE — INTERVAL H&P NOTE
The patient has been examined and the H&P has been reviewed:    I concur with the findings and no changes have occurred since H&P was written.    Surgery risks, benefits and alternative options discussed and understood by patient/family.    Kate Taylor is 69 y.o. presenting for scheduled robotic assisted laparoscopic sacrocolpopexy with synthetic mesh, possible uterosacral vs sacrospinous ligament suspension, possible anterior/posterior repair with perineorrhaphy, possible laparotomy, placement of synthetic midurethral sling vs periurethral bulking, and rectopexy and flex sig per CRS (Dr. Snyder).    Temp:  [97.5 °F (36.4 °C)] 97.5 °F (36.4 °C)  Pulse:  [67] 67  Resp:  [18] 18  SpO2:  [100 %] 100 %  BP: (128)/(77) 128/77    General: NAD, alert, oriented, cooperative  HEENT: NCAT, EOM grossly intact  Lungs: Normal WOB  Heart: regular rate  Abdomen: soft, nondistended, nontender, no rebound or guarding    Consents in chart. Pre-operative heparin given at 0607 . All questions answered and concerns addressed. To OR for planned procedure.     Rivka Rodney MD, MPH  OBGYN PGY-4        There are no hospital problems to display for this patient.

## 2024-07-25 NOTE — OPERATIVE NOTE ADDENDUM
Certification of Assistant at Surgery       Surgery Date: 7/25/2024     Participating Surgeons:  Surgeons and Role:  Panel 1:     * Mihir Rausch MD - Primary     * Rivka Rodney MD - Resident - Assisting  Panel 2:     * Clementina Snyder MD - Primary    Procedures:  Procedure(s) (LRB):  ROBOTIC SACROCOLPOPEXY, ABDOMEN (N/A)  RECTOPEXY, ROBOTIC SUTURE (N/A)  SIGMOIDOSCOPY, FLEXIBLE (N/A)  CYSTOSCOPY, WITH PERIURETHRAL BULKING AGENT INJECTION (N/A)    Assistant Surgeon's Certification of Necessity:  I understand that section 1842 (b) (6) (d) of the Social Security Act generally prohibits Medicare Part B reasonable charge payment for the services of assistants at surgery in teaching hospitals when qualified residents are available to furnish such services. I certify that the services for which payment is claimed were medically necessary, and that no qualified resident was available to perform the services. I further understand that these services are subject to post-payment review by the Medicare carrier.      Tay Dixon PA-C    07/25/2024  10:31 AM     Discharge Instructions [address], [address] [place] [address] Ilya Hackett, [id number]  Telephone: [phone]    NAME:  Becky Oleary OF BIRTH:  1948  MEDICAL RECORD NUMBER:  [id number]  DATE:  7/19/2022  WOUND CARE ORDERS:  Right lower leg wounds :Cleanse with Soap and water ,   Apply Double tubi F. Once stockings are available wear them daily, even at night if you sleep in a chair. If sleeping in bed at night you may remove the stockings at bedtime and put them back on in the morning when you get up. No further follow-up needed. TREATMENT ORDERS:    Elevate leg(s) above the level of the heart when sitting. Avoid prolonged standing in one place. Do no get dressing/wrap wet. Follow Diet as prescribed:   [x] Diet as tolerated: [] Calorie Diabetic Diet: Low carb and no Sugar [x] No Added Salt:  [x] Increase Protein: [] Limit the amount of liquid you are drinking and avoid drinking in between meals           Return Appointment:  [x] Return Appointment: No further follow-up needed at this time. [] Nurse Visit : *** days  [] Ordered tests:    Electronically signed Roque Hughes RN on 7/19/2022 at 10:54 AM     [address] [place] [address] Information: Should you experience any significant changes in your wound(s) or have questions about your wound care, please contact the [address] [place] [address] at [address] [place] [address] 8:00 am - 4:30. If you need help with your wound outside these hours and cannot wait until we are again available, contact your PCP or go to the hospital emergency room. PLEASE NOTE: IF YOU ARE UNABLE TO OBTAIN WOUND SUPPLIES, CONTINUE TO USE THE SUPPLIES YOU HAVE AVAILABLE UNTIL YOU ARE ABLE TO REACH US. IT IS MOST IMPORTANT TO KEEP THE WOUND COVERED AT ALL TIMES.      Physician Signature:_______________________    Date: ___________ Time:  ____________

## 2024-07-25 NOTE — TRANSFER OF CARE
"Anesthesia Transfer of Care Note    Patient: Kate Taylor    Procedure(s) Performed: Procedure(s) (LRB):  ROBOTIC SACROCOLPOPEXY, ABDOMEN (N/A)  RECTOPEXY, ROBOTIC SUTURE (N/A)  SIGMOIDOSCOPY, FLEXIBLE (N/A)  CYSTOSCOPY, WITH PERIURETHRAL BULKING AGENT INJECTION (N/A)    Patient location: PACU    Anesthesia Type: general    Transport from OR: Transported from OR on 6-10 L/min O2 by face mask with adequate spontaneous ventilation    Post pain: adequate analgesia    Post assessment: no apparent anesthetic complications and tolerated procedure well    Post vital signs: stable    Level of consciousness: awake and alert    Nausea/Vomiting: no nausea/vomiting    Complications: none    Transfer of care protocol was followed      Last vitals: Visit Vitals  BP (!) 148/68 (BP Location: Left arm, Patient Position: Lying)   Pulse 91   Temp 36.7 °C (98 °F) (Skin)   Resp 18   Ht 5' 2" (1.575 m)   Wt 58.5 kg (129 lb)   SpO2 99%   Breastfeeding No   BMI 23.59 kg/m²     "

## 2024-07-25 NOTE — PROGRESS NOTES
Report given to nurse Sarmiento.  Pt states pain is tolerable at 4/10, pt sleeping in between care.VSS.  All dressings C/D/I.  Banerjee draining clear yellow urine to gravity.

## 2024-07-25 NOTE — PLAN OF CARE
Problem: Adult Inpatient Plan of Care  Goal: Plan of Care Review  Outcome: Progressing  Flowsheets (Taken 7/25/2024 1459)  Plan of Care Reviewed With: patient  Goal: Optimal Comfort and Wellbeing  Outcome: Progressing  Intervention: Monitor Pain and Promote Comfort  Flowsheets (Taken 7/25/2024 1459)  Pain Management Interventions:   around-the-clock dosing utilized   care clustered   pain management plan reviewed with patient/caregiver   pillow support provided   position adjusted   quiet environment facilitated   relaxation techniques promoted  Intervention: Provide Person-Centered Care  Flowsheets (Taken 7/25/2024 1459)  Trust Relationship/Rapport:   care explained   choices provided   emotional support provided   empathic listening provided   questions encouraged   thoughts/feelings acknowledged   reassurance provided   questions answered     Problem: Wound  Goal: Optimal Pain Control and Function  Outcome: Progressing  Goal: Skin Health and Integrity  Outcome: Progressing     Problem: Infection  Goal: Absence of Infection Signs and Symptoms  Outcome: Progressing  Intervention: Prevent or Manage Infection  Flowsheets (Taken 7/25/2024 1459)  Fever Reduction/Comfort Measures: fluid intake increased  Infection Management: aseptic technique maintained

## 2024-07-26 ENCOUNTER — TELEPHONE (OUTPATIENT)
Dept: UROGYNECOLOGY | Facility: CLINIC | Age: 69
End: 2024-07-26
Payer: MEDICARE

## 2024-07-26 VITALS
TEMPERATURE: 97 F | SYSTOLIC BLOOD PRESSURE: 121 MMHG | HEIGHT: 62 IN | HEART RATE: 71 BPM | WEIGHT: 129 LBS | RESPIRATION RATE: 20 BRPM | BODY MASS INDEX: 23.74 KG/M2 | OXYGEN SATURATION: 99 % | DIASTOLIC BLOOD PRESSURE: 61 MMHG

## 2024-07-26 LAB
ANION GAP SERPL CALC-SCNC: 12 MMOL/L (ref 8–16)
BASOPHILS # BLD AUTO: 0.03 K/UL (ref 0–0.2)
BASOPHILS NFR BLD: 0.3 % (ref 0–1.9)
BUN SERPL-MCNC: 14 MG/DL (ref 8–23)
CALCIUM SERPL-MCNC: 9.5 MG/DL (ref 8.7–10.5)
CHLORIDE SERPL-SCNC: 104 MMOL/L (ref 95–110)
CO2 SERPL-SCNC: 21 MMOL/L (ref 23–29)
CREAT SERPL-MCNC: 0.9 MG/DL (ref 0.5–1.4)
DIFFERENTIAL METHOD BLD: ABNORMAL
EOSINOPHIL # BLD AUTO: 0 K/UL (ref 0–0.5)
EOSINOPHIL NFR BLD: 0.1 % (ref 0–8)
ERYTHROCYTE [DISTWIDTH] IN BLOOD BY AUTOMATED COUNT: 12.4 % (ref 11.5–14.5)
EST. GFR  (NO RACE VARIABLE): >60 ML/MIN/1.73 M^2
GLUCOSE SERPL-MCNC: 113 MG/DL (ref 70–110)
HCT VFR BLD AUTO: 38.1 % (ref 37–48.5)
HGB BLD-MCNC: 12.8 G/DL (ref 12–16)
IMM GRANULOCYTES # BLD AUTO: 0.04 K/UL (ref 0–0.04)
IMM GRANULOCYTES NFR BLD AUTO: 0.4 % (ref 0–0.5)
LYMPHOCYTES # BLD AUTO: 0.5 K/UL (ref 1–4.8)
LYMPHOCYTES NFR BLD: 5.6 % (ref 18–48)
MCH RBC QN AUTO: 28.8 PG (ref 27–31)
MCHC RBC AUTO-ENTMCNC: 33.6 G/DL (ref 32–36)
MCV RBC AUTO: 86 FL (ref 82–98)
MONOCYTES # BLD AUTO: 0.5 K/UL (ref 0.3–1)
MONOCYTES NFR BLD: 5.8 % (ref 4–15)
NEUTROPHILS # BLD AUTO: 8.2 K/UL (ref 1.8–7.7)
NEUTROPHILS NFR BLD: 87.8 % (ref 38–73)
NRBC BLD-RTO: 0 /100 WBC
PLATELET # BLD AUTO: 260 K/UL (ref 150–450)
PMV BLD AUTO: 10.3 FL (ref 9.2–12.9)
POTASSIUM SERPL-SCNC: 2.9 MMOL/L (ref 3.5–5.1)
RBC # BLD AUTO: 4.45 M/UL (ref 4–5.4)
SODIUM SERPL-SCNC: 137 MMOL/L (ref 136–145)
WBC # BLD AUTO: 9.36 K/UL (ref 3.9–12.7)

## 2024-07-26 PROCEDURE — S4991 NICOTINE PATCH NONLEGEND: HCPCS

## 2024-07-26 PROCEDURE — 25000003 PHARM REV CODE 250

## 2024-07-26 PROCEDURE — 85025 COMPLETE CBC W/AUTO DIFF WBC: CPT

## 2024-07-26 PROCEDURE — 94640 AIRWAY INHALATION TREATMENT: CPT

## 2024-07-26 PROCEDURE — 94761 N-INVAS EAR/PLS OXIMETRY MLT: CPT

## 2024-07-26 PROCEDURE — 36415 COLL VENOUS BLD VENIPUNCTURE: CPT

## 2024-07-26 PROCEDURE — 80048 BASIC METABOLIC PNL TOTAL CA: CPT

## 2024-07-26 PROCEDURE — 94799 UNLISTED PULMONARY SVC/PX: CPT

## 2024-07-26 PROCEDURE — 51798 US URINE CAPACITY MEASURE: CPT

## 2024-07-26 PROCEDURE — 25000242 PHARM REV CODE 250 ALT 637 W/ HCPCS

## 2024-07-26 PROCEDURE — 63600175 PHARM REV CODE 636 W HCPCS

## 2024-07-26 RX ORDER — IBUPROFEN 600 MG/1
600 TABLET ORAL EVERY 6 HOURS PRN
Qty: 30 TABLET | Refills: 1 | Status: SHIPPED | OUTPATIENT
Start: 2024-07-26

## 2024-07-26 RX ORDER — ACETAMINOPHEN 500 MG
1000 TABLET ORAL EVERY 8 HOURS PRN
Qty: 30 TABLET | Refills: 0 | Status: SHIPPED | OUTPATIENT
Start: 2024-07-26

## 2024-07-26 RX ORDER — OXYCODONE HYDROCHLORIDE 5 MG/1
5 TABLET ORAL EVERY 4 HOURS PRN
Qty: 30 TABLET | Refills: 0 | Status: SHIPPED | OUTPATIENT
Start: 2024-07-26

## 2024-07-26 RX ORDER — OXYCODONE HYDROCHLORIDE 5 MG/1
5 TABLET ORAL EVERY 4 HOURS PRN
Qty: 30 TABLET | Refills: 0 | Status: SHIPPED | OUTPATIENT
Start: 2024-07-26 | End: 2024-07-26

## 2024-07-26 RX ORDER — IBUPROFEN 200 MG
1 TABLET ORAL DAILY
Qty: 28 PATCH | Refills: 0 | Status: SHIPPED | OUTPATIENT
Start: 2024-07-26

## 2024-07-26 RX ORDER — DOCUSATE SODIUM 100 MG/1
100 CAPSULE, LIQUID FILLED ORAL 2 TIMES DAILY
Qty: 30 CAPSULE | Refills: 0 | Status: SHIPPED | OUTPATIENT
Start: 2024-07-26

## 2024-07-26 RX ORDER — OXYCODONE HYDROCHLORIDE 5 MG/1
5 TABLET ORAL EVERY 4 HOURS PRN
Qty: 15 TABLET | Refills: 0 | Status: SHIPPED | OUTPATIENT
Start: 2024-07-26 | End: 2024-07-26

## 2024-07-26 RX ORDER — POTASSIUM CHLORIDE 20 MEQ/1
40 TABLET, EXTENDED RELEASE ORAL ONCE
Status: COMPLETED | OUTPATIENT
Start: 2024-07-26 | End: 2024-07-26

## 2024-07-26 RX ADMIN — ATENOLOL 50 MG: 25 TABLET ORAL at 08:07

## 2024-07-26 RX ADMIN — ACETAMINOPHEN 1000 MG: 500 TABLET ORAL at 05:07

## 2024-07-26 RX ADMIN — IBUPROFEN 600 MG: 600 TABLET, FILM COATED ORAL at 01:07

## 2024-07-26 RX ADMIN — Medication 1 PATCH: at 08:07

## 2024-07-26 RX ADMIN — FLUTICASONE FUROATE AND VILANTEROL TRIFENATATE 1 PUFF: 100; 25 POWDER RESPIRATORY (INHALATION) at 07:07

## 2024-07-26 RX ADMIN — POTASSIUM CHLORIDE 40 MEQ: 1500 TABLET, EXTENDED RELEASE ORAL at 08:07

## 2024-07-26 RX ADMIN — SERTRALINE HYDROCHLORIDE 100 MG: 100 TABLET ORAL at 08:07

## 2024-07-26 RX ADMIN — HEPARIN SODIUM 5000 UNITS: 5000 INJECTION INTRAVENOUS; SUBCUTANEOUS at 05:07

## 2024-07-26 RX ADMIN — IBUPROFEN 600 MG: 600 TABLET, FILM COATED ORAL at 12:07

## 2024-07-26 RX ADMIN — ACETAMINOPHEN 1000 MG: 500 TABLET ORAL at 12:07

## 2024-07-26 RX ADMIN — ATORVASTATIN CALCIUM 40 MG: 20 TABLET, FILM COATED ORAL at 08:07

## 2024-07-26 RX ADMIN — OXYCODONE HYDROCHLORIDE 10 MG: 10 TABLET ORAL at 06:07

## 2024-07-26 RX ADMIN — MUPIROCIN: 20 OINTMENT TOPICAL at 08:07

## 2024-07-26 RX ADMIN — IBUPROFEN 600 MG: 600 TABLET, FILM COATED ORAL at 05:07

## 2024-07-26 RX ADMIN — HEPARIN SODIUM 5000 UNITS: 5000 INJECTION INTRAVENOUS; SUBCUTANEOUS at 01:07

## 2024-07-26 RX ADMIN — IPRATROPIUM BROMIDE AND ALBUTEROL SULFATE 3 ML: 2.5; .5 SOLUTION RESPIRATORY (INHALATION) at 11:07

## 2024-07-26 RX ADMIN — ONDANSETRON 8 MG: 2 INJECTION INTRAMUSCULAR; INTRAVENOUS at 06:07

## 2024-07-26 RX ADMIN — ACETAMINOPHEN 1000 MG: 500 TABLET ORAL at 01:07

## 2024-07-26 RX ADMIN — SODIUM CHLORIDE, POTASSIUM CHLORIDE, SODIUM LACTATE AND CALCIUM CHLORIDE: 600; 310; 30; 20 INJECTION, SOLUTION INTRAVENOUS at 05:07

## 2024-07-26 RX ADMIN — DOCUSATE SODIUM 100 MG: 100 CAPSULE, LIQUID FILLED ORAL at 08:07

## 2024-07-26 RX ADMIN — IPRATROPIUM BROMIDE AND ALBUTEROL SULFATE 3 ML: 2.5; .5 SOLUTION RESPIRATORY (INHALATION) at 07:07

## 2024-07-26 NOTE — PROGRESS NOTES
Surgery Inpatient Progress Note    Date: 07/26/2024    Overnight events: Tolerating diet.  Has not had a bowel movement yet.  Getting breathing treatment this morning.     O:   Vitals:    07/26/24 0746   BP:    Pulse: 64   Resp: 18   Temp:        Physical Exam   Gen: well developed female, NAD  HEENT: normocephalic, atraumatic, PERRL, EOMI   CV: RRR, no murmurs  Resp: nonlabored, CTAB   Abd: soft, incisions well approximated without erythema or drainage  MSK: no gross deformities  : rojas in place with clear yellow urine     Assessment and plan:   Kate Taylor is a 69 y.o. female with pelvic organ prolapse who is s/p sacrocolpopexy and suture rectopexy 7/25/24     - patient is doing well this morning.  - Patient reports constipation at home prior to surgery.  Discussed importance of avoiding constipation at home in the post-operative period.  1 capful miralax daily.  Increase to BID if still straining.   - Follow up scheduled for 8/2 at 11 am at [facility]       Clementina Snyder MD  Staff Surgeon   Colon & Rectal Surgery

## 2024-07-26 NOTE — PLAN OF CARE
Problem: Adult Inpatient Plan of Care  Goal: Plan of Care Review  Outcome: Met  Goal: Patient-Specific Goal (Individualized)  Outcome: Met  Goal: Absence of Hospital-Acquired Illness or Injury  Outcome: Met  Goal: Optimal Comfort and Wellbeing  Outcome: Met  Goal: Readiness for Transition of Care  Outcome: Met     Problem: Wound  Goal: Optimal Coping  Outcome: Met  Goal: Optimal Functional Ability  Outcome: Met  Goal: Absence of Infection Signs and Symptoms  Outcome: Met  Goal: Improved Oral Intake  Outcome: Met  Goal: Optimal Pain Control and Function  Outcome: Met  Goal: Skin Health and Integrity  Outcome: Met  Goal: Optimal Wound Healing  Outcome: Met     Problem: Infection  Goal: Absence of Infection Signs and Symptoms  Outcome: Met     Problem: Fall Injury Risk  Goal: Absence of Fall and Fall-Related Injury  Outcome: Met

## 2024-07-26 NOTE — PLAN OF CARE
Patient will discharge home. Appointments have been scheduled and added to AVS. Patient family will provide transportation. All CM needs have been met.    07/26/24 1355   Final Note   Assessment Type Final Discharge Note   Anticipated Discharge Disposition Home   Hospital Resources/Appts/Education Provided Provided patient/caregiver with written discharge plan information;Appointments scheduled and added to AVS   Post-Acute Status   Discharge Delays None known at this time     [facility] ([address])  Discharge Final Note    Primary Care Provider: No, Primary Doctor    Expected Discharge Date: 7/26/2024    Final Discharge Note (most recent)       Final Note - 07/26/24 1355          Final Note    Assessment Type Final Discharge Note (P)      Anticipated Discharge Disposition Home or Self Care (P)      Hospital Resources/Appts/Education Provided Provided patient/caregiver with written discharge plan information;Appointments scheduled and added to AVS (P)         Post-Acute Status    Discharge Delays None known at this time (P)                      Important Message from Medicare             Contact Info       Clementina Snyder MD   Specialty: Colon and Rectal Surgery    [address] [address]   Phone: [phone]       Next Steps: Follow up    Instructions: 8/2 at 11 AM

## 2024-07-26 NOTE — TELEPHONE ENCOUNTER
Attempted to contact patient- no answer, LVM requesting call back    ----- Message from Mihir Rausch MD sent at 7/26/2024 11:11 AM CDT -----  Regarding: please help patient make 6 week postop check  please help patient make 6 week postop check  Thanks!

## 2024-07-26 NOTE — NURSING
Reviewed discharge instructions. Pt verbalized understanding. Provided printed copy. IV removed. Awaiting son's arrival for transport home.

## 2024-07-26 NOTE — MEDICAL/APP STUDENT
Progress Note  Gynecology    Admit Date: 7/25/2024  LOS: 1    Reason for Admission:  Status post sacrocolpopexy    SUBJECTIVE:     Kate Taylor is a 69 y.o. F with HTN, HCl, UC, Asthma/COPD, sarcoidosis, tobacco abuse, depression, lumbar radiculopathy, DDD, who is POD #1 from a robotic sacrocolpopexy for POP w/ MARGE & UUI. Pain well controlled with acetaminophen, only needed to use oxycodone 1 time last night. Current pain: 5/10. Some nausea - well controlled with Zofran. No BM yet, but belching. Banerjee catheter in place - void trial to be completed this am. Hasn't ambulated, but eager to this morning. Didn't have much of an appetite last night due to nausea, but hungry this morning.     OBJECTIVE:     Vital Signs   Temp:  [97 °F (36.1 °C)-98.3 °F (36.8 °C)] 97.5 °F (36.4 °C)  Pulse:  [] 60  Resp:  [14-18] 14  SpO2:  [94 %-100 %] 99 %  BP: (118-167)/(60-80) 137/65      Intake/Output Summary (Last 24 hours) at 7/26/2024 0602  Last data filed at 7/26/2024 0548  Gross per 24 hour   Intake 2100 ml   Output 1830 ml   Net 270 ml     30 ccs per hour  350 mL since 7 pm  Expected by 7am: 360  Adequate UO     Physical Exam:  General:   alert, appears stated age, cooperative, and no distress   Lungs:   rhonchi bilaterally   Heart:   regular rate and rhythm, S1, S2 normal, no murmur, click, rub or gallop   Abdomen:  normal findings: no masses palpable and soft and appropriately tender and abnormal findings:  absent bowel sounds and hypoactive bowel sounds   Uterus:   No uterus       Incision: Bandages in place, incisions clean, dry and intact   Extremities: peripheral pulses normal, no pedal edema, no clubbing or cyanosis, no pedal edema noted       Laboratory:  Recent Labs   Lab Result Units 07/26/24  0512   WBC K/uL 9.36   RBC M/uL 4.45   Hemoglobin g/dL 12.8   Hematocrit % 38.1   MCV fL 86   MCH pg 28.8   MCHC g/dL 33.6   RDW % 12.4   Immature Granulocytes % 0.4   Gran # (ANC) K/uL 8.2*         Recent Labs   Lab  "Result Units 07/26/24  0512   Sodium mmol/L 137   Potassium mmol/L 2.9*   Chloride mmol/L 104   CO2 mmol/L 21*   Glucose mg/dL 113*   BUN mg/dL 14   Creatinine mg/dL 0.9   Calcium mg/dL 9.5   Anion Gap mmol/L 12   eGFR mL/min/1.73 m^2 >60        ASSESSMENT/PLAN:       Assessment: 69 y.o.F POD #1 from robotic sacrocolpopexy. Patient ready for d/c today pending void trial.    Plan:   1. Pain Management    A. Acetaminophen 1000 mg q6   B. Ketorolac 15mg q6 (2 doses)  C. PRN Oxycodone 5mg/10mg Q4H for pain unrelieved by acetaminophen (7-10/10)   D. PRN Hydromorphone 0.4mg for pain ? 7/10  2. DVT Prophylaxis   A. Heparin 5000U q8   B. Encourage ambulation   3. PONV  A. PRN Ondansetron 8mg a6 for N/V  B. PRN Prochloperazine 5mg q6 for N/V if ondansetron not effective   4. Maintenance of Euvolemia    A. Continuous lactated ringers 50 mL/hr   B. D/c this am - tolerating regular diet  5. Prevention of Post-op Ileus   A. Docusate sodium capsule 100mg BID   B. Encourage early ambulation  6. Banerjee Catheter in place   A. Remove this morning & perform void trial   B. UO adequate  7. HTN   A. Continue home Atenolol 50 mg BID   B. PRN Hydralazine Q6 for SBP >180  8. COPD/Asthma & Sarcoidosis   A. Continue home regimen:  I. fluticosone furoate-vilanterol 25mcg inhaler BID  II. Albuterol-ipratropium 2.5mg-0.5mg/3mL nebulizer solution q4  III. PRN Albuterol inhaler q6 for wheezing  9. Depression    A. Continue home regimen of Sertraline 100 mg  B. D/c with [facility] handout & [facility] GNO info  10. Tobacco Abuse   A. Nicotine 21 mg/24 hour patch   B. Education & d/c with [facility] "How to quit tobacco" handout  11. Lower Back Pain/DDD/Lumbar Radiculopathy   A. Cyclobenzaprine 10 mg   B. Encourage ambulation   12. HCl   A. Continue home regimen of atorvastatin 40 mg    Robyn Almanza, MS3    "

## 2024-07-26 NOTE — DISCHARGE SUMMARY
Discharge Summary  Gynecology      Admit Date: 7/25/2024    Discharge Date and Time: 7/26/2024     Attending Physician: Mihir Rausch MD    Principal Diagnoses: Status post sacrocolpopexy    Active Hospital Problems    Diagnosis  POA    *S/p RA-sacrocolpopexy/rectopexy/cysto/flex sig/bulkamid 7/25/24 [Z98.890]  Not Applicable      Resolved Hospital Problems   No resolved problems to display.       Procedures: Procedure(s) (LRB):  ROBOTIC SACROCOLPOPEXY, ABDOMEN (N/A)  RECTOPEXY, ROBOTIC SUTURE (N/A)  SIGMOIDOSCOPY, FLEXIBLE (N/A)  CYSTOSCOPY, WITH PERIURETHRAL BULKING AGENT INJECTION (N/A)    Discharged Condition: good    Hospital Course:   Kate Taylor is a 69 y.o. y.o. female who presented on 7/25/2024   for above procedures for the treatment of POP and rectal prolapse. Patient tolerated procedure. Post-operative course was u complicated.  On day of discharge, patient was urinating, ambulating, and tolerating a regular diet without difficulty. Pain was well controlled on PO medication. She was discharged home on POD#1 in stable condition with instructions to follow up with Dr. Rausch and Dr. Snyder in 2-4 weeks.     Consults: None    Significant Diagnostic Studies:  Recent Labs   Lab 07/26/24  0512   WBC 9.36   HGB 12.8   HCT 38.1   MCV 86           Treatments:   1. Surgery as above    Disposition: Home or Self Care    Patient Instructions:   Current Discharge Medication List        START taking these medications    Details   acetaminophen (TYLENOL) 500 MG tablet Take 2 tablets (1,000 mg total) by mouth every 8 (eight) hours as needed for Pain.  Qty: 30 tablet, Refills: 0      docusate sodium (COLACE) 100 MG capsule Take 1 capsule (100 mg total) by mouth 2 (two) times daily.  Qty: 30 capsule, Refills: 0      !! ibuprofen (ADVIL,MOTRIN) 600 MG tablet Take 1 tablet (600 mg total) by mouth every 6 (six) hours as needed for Pain.  Qty: 30 tablet, Refills: 1      nicotine (NICODERM CQ) 21 mg/24 hr Place 1  patch onto the skin once daily.  Qty: 30 patch, Refills: 0      oxyCODONE (ROXICODONE) 5 MG immediate release tablet Take 1 tablet (5 mg total) by mouth every 4 (four) hours as needed for Pain.  Qty: 15 tablet, Refills: 0    Comments: Quantity prescribed more than 7 day supply? No       !! - Potential duplicate medications found. Please discuss with provider.        CONTINUE these medications which have NOT CHANGED    Details   albuterol (PROVENTIL/VENTOLIN HFA) 90 mcg/actuation inhaler Inhale 2 puffs into the lungs every 6 (six) hours as needed.      bisacodyL (DULCOLAX) 5 mg EC tablet Take 5 mg by mouth.      cyclobenzaprine (FLEXERIL) 10 MG tablet Take 10 mg by mouth 3 (three) times daily as needed.      fluticasone-salmeterol 250-50 mcg/dose (ADVAIR) 250-50 mcg/dose diskus inhaler Inhale 1 puff into the lungs 2 (two) times daily.  Qty: 60 each, Refills: 11    Associated Diagnoses: Asthma, mild intermittent, uncomplicated      polyethylene glycol (GLYCOLAX) 17 gram/dose powder Take 17 g by mouth once daily.      albuterol-ipratropium 2.5mg-0.5mg/3mL (DUO-NEB) 0.5 mg-3 mg(2.5 mg base)/3 mL nebulizer solution Take 3 mLs by nebulization every 4 (four) hours as needed for Wheezing.  Qty: 100 vial, Refills: 3      amLODIPine (NORVASC) 10 MG tablet Take 10 mg by mouth.      atenoloL (TENORMIN) 50 MG tablet Take 50 mg by mouth 2 (two) times daily.      atorvastatin (LIPITOR) 40 MG tablet Take 40 mg by mouth once daily.      compress.stocking,knee,reg,med Misc 2 each by Misc.(Non-Drug; Combo Route) route once daily.  Qty: 2 each, Refills: 0      furosemide (LASIX) 20 MG tablet Take 1 tablet (20 mg total) by mouth daily as needed (leg swelling).  Qty: 14 tablet, Refills: 0      hydroCHLOROthiazide (HYDRODIURIL) 25 MG tablet Take 25 mg by mouth once daily.      !! ibuprofen (ADVIL,MOTRIN) 800 MG tablet Take 1 tablet (800 mg total) by mouth every 6 (six) hours as needed for Pain.  Qty: 20 tablet, Refills: 0      sertraline  (ZOLOFT) 100 MG tablet Take 100 mg by mouth once daily.      VITAMIN D2 1,250 mcg (50,000 unit) capsule Take 50,000 Units by mouth every 7 days.       !! - Potential duplicate medications found. Please discuss with provider.          Discharge Procedure Orders   Ambulatory referral/consult to Smoking Cessation Program   Standing Status: Future   Referral Priority: Routine Referral Type: Consultation   Referral Reason: Specialty Services Required   Requested Specialty: CTTS   Number of Visits Requested: 1     Diet Adult Regular     Lifting restrictions     Other restrictions (specify):   Order Comments: Notify MD if bleeding 1 pad/hour for 2 consecutive hours.     No driving until:   Order Comments: Do not drive while taking narcotics.     Pelvic Rest   Order Comments: Pelvic rest until cleared by MD. Nothing in vagina till cleared by MD including tampons, douching, intercourse     No dressing needed     Notify your health care provider if you experience any of the following:  temperature >100.4     Notify your health care provider if you experience any of the following:  persistent nausea and vomiting or diarrhea     Notify your health care provider if you experience any of the following:  severe uncontrolled pain     Notify your health care provider if you experience any of the following:  redness, tenderness, or signs of infection (pain, swelling, redness, odor or green/yellow discharge around incision site)     Notify your health care provider if you experience any of the following:  difficulty breathing or increased cough     Notify your health care provider if you experience any of the following:  severe persistent headache     Notify your health care provider if you experience any of the following:  worsening rash     Notify your health care provider if you experience any of the following:  persistent dizziness, light-headedness, or visual disturbances     Notify your health care provider if you experience any  of the following:  increased confusion or weakness     Notify your health care provider if you experience any of the following:   Order Comments: Notify MD if bleeding 1 pad/hour for 2 consecutive hours.     Reason for not Prescribing Nicotine Replacement   Order Comments: Already ordered     Order Specific Question Answer Comments   Reason for not Prescribing: Not medically appropriate at this time already ordered     Activity as tolerated     Weight bearing restrictions (specify):         Rivka Rodney MD, MPH  OBGYN PGY-4

## 2024-08-02 ENCOUNTER — OFFICE VISIT (OUTPATIENT)
Dept: SURGERY | Facility: CLINIC | Age: 69
End: 2024-08-02
Payer: MEDICARE

## 2024-08-02 VITALS
HEIGHT: 62 IN | SYSTOLIC BLOOD PRESSURE: 145 MMHG | OXYGEN SATURATION: 97 % | HEART RATE: 86 BPM | BODY MASS INDEX: 23.58 KG/M2 | DIASTOLIC BLOOD PRESSURE: 97 MMHG | RESPIRATION RATE: 19 BRPM

## 2024-08-02 DIAGNOSIS — K62.3 RECTAL PROLAPSE: Primary | ICD-10-CM

## 2024-08-02 PROCEDURE — 99999 PR PBB SHADOW E&M-EST. PATIENT-LVL III: CPT | Mod: PBBFAC,,, | Performed by: SURGERY

## 2024-08-02 RX ORDER — FLUCONAZOLE 150 MG/1
150 TABLET ORAL DAILY
Qty: 1 TABLET | Refills: 0 | Status: SHIPPED | OUTPATIENT
Start: 2024-08-02 | End: 2024-08-03

## 2024-08-02 NOTE — PROGRESS NOTES
[facility] Follow Up    HPI:   Kate Taylor is a 69 y.o. female who presents for follow up of pelvic organ prolapse     7/25/24 robotic suture rectopexy/ sacrocolpopexy and urethral bulking with Dr. Rausch     Interval history:   Healing well.  Has been taking miralax.  Had multiple loose bowel movements in the hospital, but has only had a few loose bowel movements since discharge. Has been compliant with smoking cessation       Objective:   Vitals:    08/02/24 1103   BP: (!) 145/97   Pulse: 86   Resp: 19        Physical Exam   Gen: well developed female, NAD  HEENT: normocephalic, atraumatic, PERRL, EOMI   CV: RRR, no murmurs  Resp: nonlabored, CTAB   Abd: soft, NTND, incisions well approximated  MSK: no gross deformities, no cyanosis or edema  Anorectal: no evidence of rectal prolapse     Assessment and Plan:   Kate Taylor  is a 69 y.o. female who presents for follow up of rectal prolapse    - patient doing well s/p robotic suture rectopexy  - recommend increasing miralax to BID   - avoid straining or constipation   - continue smoking cessation   - follow up in 1 month       Clementina Snyder MD  Staff Surgeon   Colon & Rectal Surgery

## 2024-09-04 ENCOUNTER — TELEPHONE (OUTPATIENT)
Dept: SURGERY | Facility: CLINIC | Age: 69
End: 2024-09-04
Payer: MEDICARE

## 2024-11-11 ENCOUNTER — TELEPHONE (OUTPATIENT)
Dept: UROGYNECOLOGY | Facility: CLINIC | Age: 69
End: 2024-11-11
Payer: MEDICARE

## 2024-11-11 NOTE — TELEPHONE ENCOUNTER
"Returned patients phone call- patient states had surgery with Dr. Rausch in July. Patient reporting everything was good until falling approx 2 months ago. States fell on L side. Started having "mild incontinence" that has progressively gotten worse. States having "severe" back pain that began today, no fever, slight nausea. States took two extra strength tylenol and had to lie down. Advised patient to go to nearest UC/ER if pain is that severe. Patient asking if can wait to go to PCP in morning. Verbally consulted with Dr. Rausch- informed patient per Dr. Rausch advises to go to nearest UC/ER now and follow up with PCP in AM. Patient verbalized understanding and follow up appt with urogyn made. Call ended.       ----- Message from [employer] sent at 11/11/2024  3:45 PM CST -----  Name of Who is Calling:     DINAH LEONG [[id number]]      What is the request in detail: Pt is requesting a call back. Pt advised she is experiencing lower back and right side bladder pain. [facility] is not pulling up schedule. Please assist.       Symptom: Urine Symptoms  Outcome: Schedule a same-day appointment or talk to a nurse or provider within 1 hour.  Reason: Caller denied all higher acuity questions    The caller accepted this outcome. Yes         Can the clinic reply by [facility]: No          What [facility]: [phone]  "

## 2024-11-21 ENCOUNTER — TELEPHONE (OUTPATIENT)
Dept: UROGYNECOLOGY | Facility: CLINIC | Age: 69
End: 2024-11-21
Payer: MEDICARE

## 2024-11-21 NOTE — TELEPHONE ENCOUNTER
----- Message from Juan sent at 11/21/2024  9:13 AM CST -----  Regarding: Self  [phone]  Type:  Sooner Appointment Request    Patient is requesting a sooner appointment.  Patient declined first available appointment listed as well as another facility and provider .  Patient will not accept being placed on the waitlist and is requesting a message be sent to doctor.    Name of Caller:     When is the first available appointment?  None available     Symptoms:  ER Follow up, pt had to cancel appt today     Would the patient rather a call back or a response via [facility]?  Call back     Best Call Back Number:  [phone]     Additional Information:

## 2024-11-26 ENCOUNTER — TELEPHONE (OUTPATIENT)
Dept: UROGYNECOLOGY | Facility: CLINIC | Age: 69
End: 2024-11-26
Payer: MEDICARE

## (undated) DEVICE — SYR IRRIGATION BULB STER 60ML

## (undated) DEVICE — SOL POVIDONE PREP IODINE 4 OZ

## (undated) DEVICE — PACK ROBOTIC

## (undated) DEVICE — DRAPE COLUMN DAVINCI XI

## (undated) DEVICE — SET TUBE DAVINCI 5 HI FLOW INS

## (undated) DEVICE — PORT ACCESS 8MM W/120MM LOW

## (undated) DEVICE — CONNECTOR TUBING STR 5 IN 1

## (undated) DEVICE — GRASPER EPIX 5X20MM 45CM

## (undated) DEVICE — TROCAR ENDOPATH XCEL 5MM 7.5CM

## (undated) DEVICE — SUT VICRYL 0 27 CT-2

## (undated) DEVICE — SYR 50ML CATH TIP

## (undated) DEVICE — BANDAGE BULKEE II 2.25INX3YD

## (undated) DEVICE — SUT V-LOC 180 ABD 2/0 GS-21

## (undated) DEVICE — SEALER VESSEL EXTEND

## (undated) DEVICE — OBTURATOR BLADELESS 8MM XI CLR

## (undated) DEVICE — KIT WING PAD POSITIONING

## (undated) DEVICE — SUT ETHIBOND EXCEL 0 CT2 30

## (undated) DEVICE — SYR 10CC LUER LOCK

## (undated) DEVICE — HOOK STAY ELAS 5MM 8EA/PK

## (undated) DEVICE — RETRACTOR LONE STAR 28.3X18.3

## (undated) DEVICE — SUT VLOC 90 3-0 V-20 NDL 9

## (undated) DEVICE — ADHESIVE DERMABOND ADVANCED

## (undated) DEVICE — SUT VICRYL CTD 2-0 GI 27 SH

## (undated) DEVICE — NDL INSUFFLATION VERRES 120MM

## (undated) DEVICE — CATH FOL IC 3WAY 16F 5CCLF

## (undated) DEVICE — SET CYSTO IRR DRP CHMBR 84IN

## (undated) DEVICE — DRAPE LAPSCP CHOLE 122X102X78

## (undated) DEVICE — GLOVE SENSICARE PI GRN 6.5

## (undated) DEVICE — SOL STRL WATER INJ 1000ML BG

## (undated) DEVICE — TRAY DO THE ROBOT

## (undated) DEVICE — OBTURATOR BLADELESS 8MM XI

## (undated) DEVICE — SUT 0 VICRYL PLUS CT-1 27IN

## (undated) DEVICE — TROCAR ENDOPATH XCEL 5X100MM

## (undated) DEVICE — NDL HYPO REG 25G X 1 1/2

## (undated) DEVICE — SET TRI-LUMEN FILTERED TUBE

## (undated) DEVICE — PAD PREP CUFFED NS 24X48IN

## (undated) DEVICE — SUT VLOC 90 3-0 V-20 NDL 6

## (undated) DEVICE — PORT ACCESS 5MM W/120MM

## (undated) DEVICE — PACKING VAG XRAY DTECT 2X6FT

## (undated) DEVICE — SUT PDS II 2-0 CT-2 VIL

## (undated) DEVICE — SYR SALINE FLSH PRFL STRL 10ML

## (undated) DEVICE — SOL ELECTROLUBE ANTI-STIC

## (undated) DEVICE — GLOVE SENSICARE PI SURG 7

## (undated) DEVICE — DEVICE SNAPSECURE FOL CATH

## (undated) DEVICE — GLOVE SENSICARE PI SURG 6

## (undated) DEVICE — POSITIONER HEEL FOAM CONVOLTD

## (undated) DEVICE — ELECTRODE REM PLYHSV RETURN 9

## (undated) DEVICE — GLOVE SENSICARE PI GRN 7

## (undated) DEVICE — CATH ALL PUR URTHL 20FR

## (undated) DEVICE — DRAPE ARM DAVINCI XI

## (undated) DEVICE — SUT VICRYL UNDYED BRAID 0 54IN

## (undated) DEVICE — TOWEL OR DISP STRL BLUE 4/PK

## (undated) DEVICE — SUT ETHIBOND 0 CR/CT-2 8-18

## (undated) DEVICE — SUT MCRYL PLUS 4-0 PS2 27IN

## (undated) DEVICE — SOL NORMAL USPCA 0.9%

## (undated) DEVICE — CLIPPER BLADE MOD 4406 (CAREF)

## (undated) DEVICE — SYS SEE SHARP SCP ANTIFG LNG

## (undated) DEVICE — SEAL UNIVERSAL 5MM-8MM XI

## (undated) DEVICE — TIP YANKAUERS BULB NO VENT

## (undated) DEVICE — LUBRICANT SURGILUBE 2 OZ

## (undated) DEVICE — COVER TIP CURVED SCISSORS XI

## (undated) DEVICE — IRRIGATOR ENDOSCOPY DISP.

## (undated) DEVICE — GLOVE SENSICARE PI SURG 6.5

## (undated) DEVICE — SOL POVIDONE SCRUB IODINE 4 OZ